# Patient Record
Sex: MALE | Race: WHITE | Employment: FULL TIME | URBAN - METROPOLITAN AREA
[De-identification: names, ages, dates, MRNs, and addresses within clinical notes are randomized per-mention and may not be internally consistent; named-entity substitution may affect disease eponyms.]

---

## 2017-03-07 ENCOUNTER — HOSPITAL ENCOUNTER (OUTPATIENT)
Dept: LAB | Age: 72
Discharge: HOME | End: 2017-03-07
Payer: MEDICARE

## 2017-03-07 PROCEDURE — 80061 LIPID PANEL: CPT

## 2017-03-07 PROCEDURE — 82043 UR ALBUMIN QUANTITATIVE: CPT

## 2017-03-07 PROCEDURE — 83036 HEMOGLOBIN GLYCOSYLATED A1C: CPT

## 2017-03-07 PROCEDURE — 36415 COLL VENOUS BLD VENIPUNCTURE: CPT

## 2017-03-07 PROCEDURE — 80053 COMPREHEN METABOLIC PANEL: CPT

## 2017-03-14 ENCOUNTER — OFFICE VISIT (OUTPATIENT)
Dept: ENDOCRINOLOGY | Age: 72
End: 2017-03-14

## 2017-03-14 VITALS
HEART RATE: 77 BPM | SYSTOLIC BLOOD PRESSURE: 113 MMHG | WEIGHT: 280 LBS | HEIGHT: 71 IN | BODY MASS INDEX: 39.2 KG/M2 | DIASTOLIC BLOOD PRESSURE: 49 MMHG | RESPIRATION RATE: 14 BRPM | TEMPERATURE: 97.5 F

## 2017-03-14 DIAGNOSIS — E11.65 UNCONTROLLED TYPE 2 DIABETES MELLITUS WITH HYPERGLYCEMIA, WITH LONG-TERM CURRENT USE OF INSULIN (HCC): Primary | ICD-10-CM

## 2017-03-14 DIAGNOSIS — I10 ESSENTIAL HYPERTENSION: ICD-10-CM

## 2017-03-14 DIAGNOSIS — Z79.4 UNCONTROLLED TYPE 2 DIABETES MELLITUS WITH HYPERGLYCEMIA, WITH LONG-TERM CURRENT USE OF INSULIN (HCC): Primary | ICD-10-CM

## 2017-03-14 DIAGNOSIS — E66.01 MORBID OBESITY DUE TO EXCESS CALORIES (HCC): ICD-10-CM

## 2017-03-14 DIAGNOSIS — E78.2 MIXED HYPERLIPIDEMIA: ICD-10-CM

## 2017-03-14 RX ORDER — CARVEDILOL 12.5 MG/1
25 TABLET ORAL 2 TIMES DAILY WITH MEALS
COMMUNITY
End: 2018-08-31 | Stop reason: ALTCHOICE

## 2017-03-14 NOTE — PROGRESS NOTES
HISTORY OF PRESENT ILLNESS   Bony Schroeder is a 67  y.o. male. HPI   Patient here for follow up of Type 2 diabetes mellitus after his last visit in Dec 2016     Lost 3  lbs   Forgot the meter   denies lack of  compliance with diet    Not able to take bydureon because of donut hole and he just got into catastrophic coverage     He has not started on humalog and he stayed on prandin  He appears not  depressed today    His cardiologist Dr. Niko Arnold increased coreg dose two weeks ago     Denies any low sugars   Down load showed better numbers mostly but few high sugars too             Prior DM history-   H/o DM 2 for 11 yrs. Discontinued victoza sec to diarrhoea and higher co-pay. Started back on Byetta   Current diabetic medications include Metformin, glipizide and Byetta. Current monitoring regimen: home blood tests - 2 times daily   Home blood sugar records: fasting range: 140-10mg postprandial range: 130-160 mg   Post prandial more than 300 mg   Current exercise: no regular exercise   Known diabetic complications: peripheral neuropathy and cardiovascular diseases/p CABG           Review of Systems   Constitutional: Negative. HENT: Negative. Eyes: Negative for pain and redness. Respiratory: Negative. Cardiovascular: Negative for chest pain, palpitations and leg swelling. Gastrointestinal: Negative. Negative for constipation. Genitourinary: Negative. Musculoskeletal: Negative for myalgias. Skin: Negative. Neurological: Negative. Endo/Heme/Allergies: Negative. Psychiatric/Behavioral: Negative for depression and memory loss. The patient does not have insomnia. Physical Exam   Constitutional: He is oriented to person, place, and time. He appears well-developed and well-nourished. HENT:   Head: Normocephalic. Eyes: Conjunctivae and extraocular motions are normal. Pupils are equal, round, and reactive to light. Neck: Normal range of motion. Neck supple. No JVD present.  No tracheal deviation present. No thyromegaly present. Cardiovascular: Normal rate, regular rhythm and normal heart sounds. Pulmonary/Chest: Effort normal and breath sounds normal.   Abdominal: Soft. Bowel sounds are normal.   Musculoskeletal: Normal range of motion. Lymphadenopathy:   He has no cervical adenopathy. Neurological: He is alert and oriented to person, place, and time. He has normal reflexes. Skin: Skin is warm. Psychiatric: He has a normal mood and affect. Diabetic feet exam june 2016    H/o partial or complete amputation of foot : no  H/o previous foot ulceration : no  H/o pre - ulcerative callus : no  H/o peripheral neuropathy and callus : yes  H/o poor circulation     BRIDGER    : no  Foot deformity : no        Lab Results   Component Value Date/Time    Hemoglobin A1c 8.3 03/07/2017 08:33 AM    Hemoglobin A1c 8.2 12/06/2016 02:25 PM    Hemoglobin A1c 8.8 09/06/2016 12:00 AM    Microalb/Creat ratio (ug/mg creat.) 6.9 03/07/2017 08:33 AM    LDL, calculated 80 03/07/2017 08:33 AM    Creatinine 1.00 03/07/2017 08:33 AM      Lab Results   Component Value Date/Time    Cholesterol, total 149 03/07/2017 08:33 AM    HDL Cholesterol 39 03/07/2017 08:33 AM    LDL, calculated 80 03/07/2017 08:33 AM    Triglyceride 150 03/07/2017 08:33 AM     Lab Results   Component Value Date/Time    AST (SGOT) 11 03/07/2017 08:33 AM    Alk. phosphatase 82 03/07/2017 08:33 AM     Lab Results   Component Value Date/Time    GFR est AA 86 03/07/2017 08:33 AM    GFR est non-AA 74 03/07/2017 08:33 AM    Creatinine 1.00 03/07/2017 08:33 AM    BUN 23 03/07/2017 08:33 AM    Sodium 134 03/07/2017 08:33 AM    Potassium 4.7 03/07/2017 08:33 AM    Chloride 95 03/07/2017 08:33 AM    CO2 23 03/07/2017 08:33 AM              ASSESSMENT and PLAN       1. Type 2 diabetes mellitus, uncontrolled.  A1C is  8.3   %    From  March 2016   Compared to  8.2 %     From  Dec 2016   comapred to 8.8 %    From sept 2016  compared to  8.1 %     From march 2015 compared to    8 %    From dec 2014  Compared to    7 %  From May 2014 compared to  7.4 % from feb 2014 compared to 7.3 % from nov 2013 compared to  6.8 % from June 2013 compared to  8 % march 2013 ; compared to  7.1 % July 2012 compared to 7 % compared to 6.9 % from oct 2011     Losing    glycemic control  , with dietary non-compliance      reviewed the log     The patient is told to continue on Glucophage  And  On and off on bydureon by cost and coverage   Could not tolerate victoza as he had GI distress     Encouraged him to start on meal time  insulin at least at  dinner   So, started him on 10 units with dinner   And no prandin   Please check 2 hours dinner     Recommending  IPRO         2. Hypertension. Blood pressure is well controlled on lisinopril, HCTZ and carvedilol. 3. Dyslipidemia. Zocor 40 mg is being continued. 4. PAD : normal BRIDGER     5. CAD/CABG : 2003-  afib- s/p cardioablation- on pradexa  And Tikosyn   F/u with dr. Arianna Felipe and Dr Jose Manuel Keenan     6  Hypogonadism - from june 2013 labs , hypergonadotropic, and fatigue is from low T     7. Obesity : Body mass index is 39.05 kg/(m^2).   TLC advised   bydureon optional by cost       > 50 % of time is spent on counseling

## 2017-03-14 NOTE — PATIENT INSTRUCTIONS
bydureon 2 mg once  A week     Check blood sugars immediately before each meal and at bedtime     NPH insulin 70 units at bed time ( less expensive  Go to   [employer] [facility] [place])    Take prandin  2 mg right before b-fast and lunch    Humulin regular   Insulin  10 units  with dinner ,  but you will not take prandin before that meal    Less than 70 mg NO INSULIN        Do not skip meals  Do not eat in between meals  Reduce carbs- pasta, rice, potatoes, bread   Do not drink juices or sodas  Do not eat peanut butter     Do not eat sugar free cookies and cakes   Do not eat grapes, ornages, pine apple and peaches , raisins

## 2017-03-14 NOTE — MR AVS SNAPSHOT
Visit Information Date & Time Provider Department Dept. Phone Encounter #  
 3/14/2017  9:45 AM Mandy Roberson MD [facility] [phone] [id number] Follow-up Instructions Return in about 2 months (around 5/14/2017). Upcoming Health Maintenance Date Due  
 FOOT EXAM Q1 1/14/1954 DTaP/Tdap/Td series (1 - Tdap) 1/14/1965 FOBT Q 1 YEAR AGE 50-75 1/14/1994 ZOSTER VACCINE AGE 60> 1/14/2004 GLAUCOMA SCREENING Q2Y 1/14/2009 Pneumococcal 65+ Low/Medium Risk (1 of 2 - PCV13) 1/14/2009 MEDICARE YEARLY EXAM 1/14/2009 EYE EXAM RETINAL OR DILATED Q1 6/30/2015 INFLUENZA AGE 9 TO ADULT 8/1/2016 HEMOGLOBIN A1C Q6M 9/7/2017 MICROALBUMIN Q1 3/7/2018 LIPID PANEL Q1 3/7/2018 Allergies as of 3/14/2017  Review Complete On: 3/14/2017 By: Mandy Roberson MD  
  
 Severity Noted Reaction Type Reactions Sulfa (Sulfonamide Antibiotics)  09/08/2010    Unable to Obtain Current Immunizations  Never Reviewed No immunizations on file. Not reviewed this visit You Were Diagnosed With   
  
 Codes Comments Uncontrolled type 2 diabetes mellitus with hyperglycemia, with long-term current use of insulin (HCC)    -  Primary ICD-10-CM: E11.65, Z79.4 ICD-9-CM: 250.02, V58.67 Morbid obesity due to excess calories (HCC)     ICD-10-CM: E66.01 
ICD-9-CM: 278.01 Mixed hyperlipidemia     ICD-10-CM: E78.2 ICD-9-CM: 272.2 Essential hypertension     ICD-10-CM: I10 
ICD-9-CM: 401.9 Vitals BP Pulse Temp Resp Height(growth percentile) Weight(growth percentile) 113/49 (BP 1 Location: Left arm, BP Patient Position: Sitting) 77 97.5 °F (36.4 °C) (Oral) 14 5' 11\" (1.803 m) 280 lb (127 kg) BMI Smoking Status 39.05 kg/m2 Never Smoker BMI and BSA Data Body Mass Index Body Surface Area 39.05 kg/m 2 2.52 m 2 Preferred Pharmacy Pharmacy Name Phone [address] [place], [place] - [address] [phone] Your Updated Medication List  
  
   
This list is accurate as of: 3/14/17 10:27 AM.  Always use your most recent med list.  
  
  
  
  
 AMITRIPTYLINE PO Take 25 mg by mouth nightly. COREG 12.5 mg tablet Generic drug:  carvedilol Take  by mouth two (2) times daily (with meals). exenatide microspheres 2 mg/0.65 mL Pnij Commonly known as:  BYDUREON  
2 mg by SubCUTAneous route every seven (7) days. glucose blood VI test strips strip Commonly known as:  TRUETEST TEST STRIPS Test 4 times daily. Dx code E11.65 Insulin Needles (Disposable) 32 gauge x 5/32\" Ndle Commonly known as:  Carolann Pen Needle Use once daily. Dx code E11.65  
  
 insulin  unit/mL injection Commonly known as:  Catherine Gains Inject 70 units at bedtime. insulin regular 100 unit/mL injection Commonly known as:  Brian Hong, HUMULIN R Inject 10 units before dinner  
  
 insulin syringe-needle U-100 1 mL 31 gauge x 15/64\" Syrg Commonly known as:  BD INSULIN SYRINGE ULTRA-FINE  
1 Syringe by SubCUTAneous route two (2) times a day. Dx code E11.65  
  
 lancets 28 gauge Misc Commonly known as:  Alice Km Test 4 times daily. Dx code E11.65  
  
 LASIX 80 mg tablet Generic drug:  furosemide Take  by mouth daily. lisinopril 20 mg tablet Commonly known as:  Remberto Shutters Take  by mouth daily. magnesium oxide 400 mg tablet Commonly known as:  MAG-OX Take 400 mg by mouth daily. metFORMIN 1,000 mg tablet Commonly known as:  GLUCOPHAGE  
TAKE 1 TABLET TWICE A DAYWITH MEALS  
  
 MOBIC PO Take 15 mg by mouth as needed. penicillin v potassium 250 mg tablet Commonly known as:  VEETID Take  by mouth two (2) times a day. PRADAXA 150 mg capsule Generic drug:  dabigatran etexilate Take  by mouth every twelve (12) hours. repaglinide 2 mg tablet Commonly known as:  Iraj Loll Take 1 tablet before breakfast, and 1 tablet before lunch  
  
 simvastatin 40 mg tablet Commonly known as:  ZOCOR Take 20 mg by mouth nightly. TIKOSYN 250 mcg capsule Generic drug:  dofetilide Take 250 mcg by mouth two (2) times a day. traMADol 50 mg tablet Commonly known as:  ULTRAM  
Take 50 mg by mouth daily as needed. Follow-up Instructions Return in about 2 months (around 5/14/2017). Patient Instructions   
 
bydureon 2 mg once  A week Check blood sugars immediately before each meal and at bedtime NPH insulin 70 units at bed time ( less expensive  Go to   [employer] [facility] [place]) Take prandin  2 mg right before b-fast and lunch Humulin regular   Insulin  10 units  with dinner ,  but you will not take prandin before that meal   
Less than 70 mg NO INSULIN 
 
 
 
Do not skip meals Do not eat in between meals Reduce carbs- pasta, rice, potatoes, bread Do not drink juices or sodas Do not eat peanut butter Do not eat sugar free cookies and cakes Do not eat grapes, ornages, pine apple and peaches , raisins Introducing [facility]! Kandice Duran introduces [employer] patient portal. Now you can access parts of your medical record, email your doctor's office, and request medication refills online. 1. In your internet browser, go to [email or web address] 2. Click on the First Time User? Click Here link in the Sign In box. You will see the New Member Sign Up page. 3. Enter your [employer] Access Code exactly as it appears below. You will not need to use this code after youve completed the sign-up process. If you do not sign up before the expiration date, you must request a new code. · [employer] Access Code: [email or web address] Expires: 6/12/2017 10:27 AM 
 
4.  Enter the last four digits of your Social Security Number (xxxx) and Date of Birth (mm/dd/yyyy) as indicated and click Submit. You will be taken to the next sign-up page. 5. Create a [employer] ID. This will be your [employer] login ID and cannot be changed, so think of one that is secure and easy to remember. 6. Create a [employer] password. You can change your password at any time. 7. Enter your Password Reset Question and Answer. This can be used at a later time if you forget your password. 8. Enter your e-mail address. You will receive e-mail notification when new information is available in [address] 9. Click Sign Up. You can now view and download portions of your medical record. 10. Click the Download Summary menu link to download a portable copy of your medical information. If you have questions, please visit the Frequently Asked Questions section of the [employer] website. Remember, [employer] is NOT to be used for urgent needs. For medical emergencies, dial 911. Now available from your iPhone and Android! Please provide this summary of care documentation to your next provider. Your primary care clinician is listed as Jackie Bradshaw. If you have any questions after today's visit, please call [phone].

## 2017-03-14 NOTE — PROGRESS NOTES
Concerns with blood pressure being a little low.  increased coreg 2 weeks ago  Wt Readings from Last 3 Encounters:   03/14/17 280 lb (127 kg)   12/13/16 283 lb (128.4 kg)   09/12/16 284 lb (128.8 kg)     Temp Readings from Last 3 Encounters:   03/14/17 97.5 °F (36.4 °C) (Oral)   12/13/16 98 °F (36.7 °C) (Oral)   09/12/16 97.4 °F (36.3 °C) (Oral)     BP Readings from Last 3 Encounters:   03/14/17 113/49   12/13/16 140/63   09/12/16 131/64     Pulse Readings from Last 3 Encounters:   03/14/17 77   12/13/16 80   09/12/16 80     Lab Results   Component Value Date/Time    Hemoglobin A1c 8.3 03/07/2017 08:33 AM    Hemoglobin A1c (POC) 6.9 09/24/2015 02:13 PM

## 2017-04-28 RX ORDER — REPAGLINIDE 2 MG/1
TABLET ORAL
Qty: 90 TAB | Refills: 6 | Status: SHIPPED | OUTPATIENT
Start: 2017-04-28 | End: 2017-05-22 | Stop reason: SDUPTHER

## 2017-05-22 RX ORDER — REPAGLINIDE 2 MG/1
TABLET ORAL
Qty: 270 TAB | Refills: 4 | Status: SHIPPED | OUTPATIENT
Start: 2017-05-22 | End: 2018-05-09 | Stop reason: SDUPTHER

## 2017-07-01 ENCOUNTER — OFFICE VISIT (OUTPATIENT)
Dept: ENDOCRINOLOGY | Age: 72
End: 2017-07-01

## 2017-07-01 VITALS
HEART RATE: 74 BPM | WEIGHT: 278.8 LBS | OXYGEN SATURATION: 97 % | DIASTOLIC BLOOD PRESSURE: 54 MMHG | BODY MASS INDEX: 39.03 KG/M2 | RESPIRATION RATE: 18 BRPM | HEIGHT: 71 IN | SYSTOLIC BLOOD PRESSURE: 116 MMHG | TEMPERATURE: 98.4 F

## 2017-07-01 DIAGNOSIS — I10 ESSENTIAL HYPERTENSION: ICD-10-CM

## 2017-07-01 DIAGNOSIS — E66.01 MORBID OBESITY DUE TO EXCESS CALORIES (HCC): ICD-10-CM

## 2017-07-01 DIAGNOSIS — E11.65 UNCONTROLLED TYPE 2 DIABETES MELLITUS WITH HYPERGLYCEMIA, WITH LONG-TERM CURRENT USE OF INSULIN (HCC): Primary | ICD-10-CM

## 2017-07-01 DIAGNOSIS — E78.2 MIXED HYPERLIPIDEMIA: ICD-10-CM

## 2017-07-01 DIAGNOSIS — Z79.4 UNCONTROLLED TYPE 2 DIABETES MELLITUS WITH HYPERGLYCEMIA, WITH LONG-TERM CURRENT USE OF INSULIN (HCC): Primary | ICD-10-CM

## 2017-07-01 LAB
GLUCOSE POC: 196 MG/DL
HBA1C MFR BLD HPLC: 7.9 %

## 2017-07-01 NOTE — PROGRESS NOTES
Wt Readings from Last 3 Encounters:   07/01/17 278 lb 12.8 oz (126.5 kg)   03/14/17 280 lb (127 kg)   12/13/16 283 lb (128.4 kg)     Temp Readings from Last 3 Encounters:   07/01/17 98.4 °F (36.9 °C) (Oral)   03/14/17 97.5 °F (36.4 °C) (Oral)   12/13/16 98 °F (36.7 °C) (Oral)     BP Readings from Last 3 Encounters:   07/01/17 116/54   03/14/17 113/49   12/13/16 140/63     Pulse Readings from Last 3 Encounters:   07/01/17 74   03/14/17 77   12/13/16 80     Lab Results   Component Value Date/Time    Hemoglobin A1c 8.3 03/07/2017 08:33 AM    Hemoglobin A1c (POC) 6.9 09/24/2015 02:13 PM   Foot exam: No  Eye exam: no    1. Have you been to the ER, urgent care clinic since your last visit? Hospitalized since your last visit? No    2. Have you seen or consulted any other health care providers outside of the [address] [place] [address] since your last visit? Include any pap smears or colon screening. Patient reports seeing Anne Marie Amezquita for heart related issues.

## 2017-07-01 NOTE — PATIENT INSTRUCTIONS
bydureon 2 mg once  A week ( optional)      Start on Jardiance  10 mg a day     Check blood sugars immediately before each meal and at bedtime     NPH insulin 70 units at bed time ( less expensive  Go to   [employer] [facility])    Take prandin  2 mg right before b-fast and lunch    Humulin regular   Insulin  10 units  with dinner ,  but you will not take prandin before that meal    Less than 70 mg NO INSULIN        Do not skip meals  Do not eat in between meals  Reduce carbs- pasta, rice, potatoes, bread   Do not drink juices or sodas  Do not eat peanut butter     Do not eat sugar free cookies and cakes   Do not eat grapes, ornages, pine apple and peaches , raisins

## 2017-07-01 NOTE — PROGRESS NOTES
HISTORY OF PRESENT ILLNESS   Edy Sepulveda is a 67  y.o. male. HPI   Patient here for follow up of Type 2 diabetes mellitus after his last visit in MArch 2017     Lost 2  lbs   Forgot the meter AGAIN second time   denies lack of  compliance with diet    Not able to take bydureon because of donut hole and he just got into catastrophic coverage     He has not started on humalog and he stayed on prandin  He appears not  depressed today    Denies any low sugars   Down load showed better numbers mostly but few high sugars too             Prior DM history-   H/o DM 2 for 11 yrs. Discontinued victoza sec to diarrhoea and higher co-pay. Started back on Byetta   Current diabetic medications include Metformin, glipizide and Byetta. Current monitoring regimen: home blood tests - 2 times daily   Home blood sugar records: fasting range: 140-10mg postprandial range: 130-160 mg   Post prandial more than 300 mg   Current exercise: no regular exercise   Known diabetic complications: peripheral neuropathy and cardiovascular diseases/p CABG           Review of Systems   Constitutional: Negative. HENT: Negative. Eyes: Negative for pain and redness. Respiratory: Negative. Cardiovascular: Negative for chest pain, palpitations and leg swelling. Gastrointestinal: Negative. Negative for constipation. Genitourinary: Negative. Musculoskeletal: Negative for myalgias. Skin: Negative. Neurological: Negative. Endo/Heme/Allergies: Negative. Psychiatric/Behavioral: Negative for depression and memory loss. The patient does not have insomnia. Physical Exam   Constitutional: He is oriented to person, place, and time. He appears well-developed and well-nourished. HENT:   Head: Normocephalic. Eyes: Conjunctivae and extraocular motions are normal. Pupils are equal, round, and reactive to light. Neck: Normal range of motion. Neck supple. No JVD present. No tracheal deviation present. No thyromegaly present. Cardiovascular: Normal rate, regular rhythm and normal heart sounds. Pulmonary/Chest: Effort normal and breath sounds normal.   Abdominal: Soft. Bowel sounds are normal.   Musculoskeletal: Normal range of motion. Lymphadenopathy:   He has no cervical adenopathy. Neurological: He is alert and oriented to person, place, and time. He has normal reflexes. Skin: Skin is warm. Psychiatric: He has a normal mood and affect. Diabetic feet exam July 2017    H/o partial or complete amputation of foot : no  H/o previous foot ulceration : no  H/o pre - ulcerative callus : no  H/o peripheral neuropathy and callus : yes  H/o poor circulation     BRIDGER    : no  Foot deformity : no        Lab Results   Component Value Date/Time    Hemoglobin A1c 8.3 03/07/2017 08:33 AM    Hemoglobin A1c 8.2 12/06/2016 02:25 PM    Hemoglobin A1c 8.8 09/06/2016 12:00 AM    Microalb/Creat ratio (ug/mg creat.) 6.9 03/07/2017 08:33 AM    LDL, calculated 80 03/07/2017 08:33 AM    Creatinine 1.00 03/07/2017 08:33 AM      Lab Results   Component Value Date/Time    Cholesterol, total 149 03/07/2017 08:33 AM    HDL Cholesterol 39 03/07/2017 08:33 AM    LDL, calculated 80 03/07/2017 08:33 AM    Triglyceride 150 03/07/2017 08:33 AM     Lab Results   Component Value Date/Time    AST (SGOT) 11 03/07/2017 08:33 AM    Alk. phosphatase 82 03/07/2017 08:33 AM     Lab Results   Component Value Date/Time    GFR est AA 86 03/07/2017 08:33 AM    GFR est non-AA 74 03/07/2017 08:33 AM    Creatinine 1.00 03/07/2017 08:33 AM    BUN 23 03/07/2017 08:33 AM    Sodium 134 03/07/2017 08:33 AM    Potassium 4.7 03/07/2017 08:33 AM    Chloride 95 03/07/2017 08:33 AM    CO2 23 03/07/2017 08:33 AM              ASSESSMENT and PLAN       1. Type 2 diabetes mellitus, uncontrolled.  A1C is 7.9 %     From     July 1 2017   8.3   %    From  March 2016   Compared to  8.2 %     From  Dec 2016   comapred to 8.8 %    From sept 2016  compared to  8.1 %     From march 2015 compared to    8 %    From dec 2014  Compared to    7 %  From May 2014 compared to  7.4 % from feb 2014 compared to 7.3 % from nov 2013 compared to  6.8 % from June 2013 compared to  8 % march 2013 ; compared to  7.1 % July 2012 compared to 7 % compared to 6.9 % from oct 2011     Some improved  glycemic control  , with dietary non-compliance      reviewed the log     He went into Donut hole early May     The log is showing higher fasting sugars whenever he missed the lantus     The patient is told to continue on Glucophage  And  On and off on bydureon by cost and coverage   Could not tolerate victoza as he had GI distress     Encouraged him to start on meal time  insulin at least at  dinner   So, started him on 10 units with dinner   And no prandin   Please check 2 hours dinner           2. Hypertension. Blood pressure is well controlled on lisinopril, HCTZ and carvedilol. 3. Dyslipidemia. Zocor 40 mg is being continued. 4. PAD : normal BRIDGER     5. CAD/CABG : 2003-  afib- s/p cardioablation- on pradexa  And Tikosyn   F/u with dr. Dustin Greenberg and Dr Jose Manuel Mccullough     6  Hypogonadism - from june 2013 labs , hypergonadotropic, and fatigue is from low T     7. Obesity : Body mass index is 38.88 kg/(m^2).   TLC advised   bydureon optional by cost       > 50 % of time is spent on counseling     Addendum     IPRO was never finished because of technical  Issues  His plan covers only invokana and farxiga , not jardiance   reviewed his food log with him  Reviewed his meter

## 2017-07-01 NOTE — MR AVS SNAPSHOT
Visit Information Date & Time Provider Department Dept. Phone Encounter #  
 7/1/2017  9:00 AM Fernandez Buchanan MD [facility] [phone] [id number] Follow-up Instructions Return in about 4 months (around 11/1/2017). Upcoming Health Maintenance Date Due  
 FOOT EXAM Q1 1/14/1954 DTaP/Tdap/Td series (1 - Tdap) 1/14/1965 FOBT Q 1 YEAR AGE 50-75 1/14/1994 ZOSTER VACCINE AGE 60> 1/14/2004 GLAUCOMA SCREENING Q2Y 1/14/2009 Pneumococcal 65+ Low/Medium Risk (1 of 2 - PCV13) 1/14/2009 MEDICARE YEARLY EXAM 1/14/2009 EYE EXAM RETINAL OR DILATED Q1 6/30/2015 INFLUENZA AGE 9 TO ADULT 8/1/2017 HEMOGLOBIN A1C Q6M 9/7/2017 MICROALBUMIN Q1 3/7/2018 LIPID PANEL Q1 3/7/2018 Allergies as of 7/1/2017  Review Complete On: 7/1/2017 By: Fernandez Buchanan MD  
  
 Severity Noted Reaction Type Reactions Sulfa (Sulfonamide Antibiotics)  09/08/2010    Unable to Obtain Current Immunizations  Never Reviewed No immunizations on file. Not reviewed this visit You Were Diagnosed With   
  
 Codes Comments Uncontrolled type 2 diabetes mellitus with hyperglycemia, with long-term current use of insulin (HCC)    -  Primary ICD-10-CM: E11.65, Z79.4 ICD-9-CM: 250.02, V58.67 Essential hypertension     ICD-10-CM: I10 
ICD-9-CM: 401.9 Mixed hyperlipidemia     ICD-10-CM: E78.2 ICD-9-CM: 272.2 Morbid obesity due to excess calories (HCC)     ICD-10-CM: E66.01 
ICD-9-CM: 278.01   
 BMI 38.0-38.9,adult     ICD-10-CM: G27.16 
ICD-9-CM: V85.38 Vitals BP Pulse Temp Resp Height(growth percentile) Weight(growth percentile) 116/54 (BP 1 Location: Right arm, BP Patient Position: Sitting) 74 98.4 °F (36.9 °C) (Oral) 18 5' 11\" (1.803 m) 278 lb 12.8 oz (126.5 kg) SpO2 BMI Smoking Status 97% 38.88 kg/m2 Never Smoker BMI and BSA Data  Body Mass Index Body Surface Area  
 38.88 kg/m 2 2.52 m 2  
  
  
 Preferred Pharmacy Pharmacy Name Phone [phone] [place], [place] - [address] [phone] Your Updated Medication List  
  
   
This list is accurate as of: 7/1/17  9:28 AM.  Always use your most recent med list.  
  
  
  
  
 AMITRIPTYLINE PO Take 25 mg by mouth nightly. COREG 12.5 mg tablet Generic drug:  carvedilol Take 25 mg by mouth two (2) times daily (with meals). exenatide microspheres 2 mg/0.65 mL Pnij Commonly known as:  BYDUREON  
2 mg by SubCUTAneous route every seven (7) days. glucose blood VI test strips strip Commonly known as:  TRUETEST TEST STRIPS Test 4 times daily. Dx code E11.65 Insulin Needles (Disposable) 32 gauge x 5/32\" Ndle Commonly known as:  Carolann Pen Needle Use once daily. Dx code E11.65  
  
 insulin  unit/mL injection Commonly known as:  Tierra Ivory Inject 70 units at bedtime. insulin regular 100 unit/mL injection Commonly known as:  Meghan Mendoza, HUMULIN R Inject 10 units before dinner  
  
 insulin syringe-needle U-100 1 mL 31 gauge x 15/64\" Syrg Commonly known as:  BD INSULIN SYRINGE ULTRA-FINE  
1 Syringe by SubCUTAneous route two (2) times a day. Dx code E11.65  
  
 lancets 28 gauge Misc Commonly known as:  Ashley Shahid Test 4 times daily. Dx code E11.65  
  
 LASIX 80 mg tablet Generic drug:  furosemide Take  by mouth daily. lisinopril 20 mg tablet Commonly known as:  Getachew Annemarie Take  by mouth daily. magnesium oxide 400 mg tablet Commonly known as:  MAG-OX Take 400 mg by mouth daily. metFORMIN 1,000 mg tablet Commonly known as:  GLUCOPHAGE  
TAKE 1 TABLET TWICE A DAYWITH MEALS  
  
 MOBIC PO Take 15 mg by mouth as needed. penicillin v potassium 250 mg tablet Commonly known as:  VEETID Take  by mouth two (2) times a day. PRADAXA 150 mg capsule Generic drug:  dabigatran etexilate Take  by mouth every twelve (12) hours. repaglinide 2 mg tablet Commonly known as:  PRANDIN  
TAKE 1 TABLET BEFORE BREAKFAST, LUNCH AN DINNER  
  
 simvastatin 40 mg tablet Commonly known as:  ZOCOR Take 20 mg by mouth nightly. TIKOSYN 250 mcg capsule Generic drug:  dofetilide Take 250 mcg by mouth two (2) times a day. traMADol 50 mg tablet Commonly known as:  ULTRAM  
Take 50 mg by mouth daily as needed. We Performed the Following AMB POC GLUCOSE, QUANTITATIVE, BLOOD [29989 CPT(R)] AMB POC HEMOGLOBIN A1C [29749 CPT(R)] Follow-up Instructions Return in about 4 months (around 11/1/2017). Patient Instructions   
 
bydureon 2 mg once  A week ( optional) Start on Jardiance  10 mg a day Check blood sugars immediately before each meal and at bedtime NPH insulin 70 units at bed time ( less expensive  Go to   [facility]) Take prandin  2 mg right before b-fast and lunch Humulin regular   Insulin  10 units  with dinner ,  but you will not take prandin before that meal   
Less than 70 mg NO INSULIN 
 
 
 
Do not skip meals Do not eat in between meals Reduce carbs- pasta, rice, potatoes, bread Do not drink juices or sodas Do not eat peanut butter Do not eat sugar free cookies and cakes Do not eat grapes, ornages, pine apple and peaches , raisins Introducing [facility] & HEALTH SERVICES! Denilson Artis introduces [employer] patient portal. Now you can access parts of your medical record, email your doctor's office, and request medication refills online. 1. In your internet browser, go to [email or web address] 2. Click on the First Time User? Click Here link in the Sign In box. You will see the New Member Sign Up page. 3. Enter your [employer] Access Code exactly as it appears below. You will not need to use this code after youve completed the sign-up process.  If you do not sign up before the expiration date, you must request a new code. · [employer] Access Code: [email or web address] Expires: 9/29/2017  9:28 AM 
 
4. Enter the last four digits of your Social Security Number (xxxx) and Date of Birth (mm/dd/yyyy) as indicated and click Submit. You will be taken to the next sign-up page. 5. Create a [employer] ID. This will be your [employer] login ID and cannot be changed, so think of one that is secure and easy to remember. 6. Create a [employer] password. You can change your password at any time. 7. Enter your Password Reset Question and Answer. This can be used at a later time if you forget your password. 8. Enter your e-mail address. You will receive e-mail notification when new information is available in [address] 9. Click Sign Up. You can now view and download portions of your medical record. 10. Click the Download Summary menu link to download a portable copy of your medical information. If you have questions, please visit the Frequently Asked Questions section of the [employer] website. Remember, [employer] is NOT to be used for urgent needs. For medical emergencies, dial 911. Now available from your iPhone and Android! Please provide this summary of care documentation to your next provider. Your primary care clinician is listed as Verena Rondon. If you have any questions after today's visit, please call [phone].

## 2017-10-26 ENCOUNTER — HOSPITAL ENCOUNTER (OUTPATIENT)
Dept: LAB | Age: 72
Discharge: HOME | End: 2017-10-26
Payer: MEDICARE

## 2017-10-26 PROCEDURE — 83036 HEMOGLOBIN GLYCOSYLATED A1C: CPT

## 2017-10-26 PROCEDURE — 82043 UR ALBUMIN QUANTITATIVE: CPT

## 2017-10-26 PROCEDURE — 80061 LIPID PANEL: CPT

## 2017-10-26 PROCEDURE — 80053 COMPREHEN METABOLIC PANEL: CPT

## 2017-10-26 PROCEDURE — 36415 COLL VENOUS BLD VENIPUNCTURE: CPT

## 2017-11-02 ENCOUNTER — OFFICE VISIT (OUTPATIENT)
Dept: ENDOCRINOLOGY | Age: 72
End: 2017-11-02

## 2017-11-02 VITALS
DIASTOLIC BLOOD PRESSURE: 63 MMHG | WEIGHT: 277 LBS | RESPIRATION RATE: 18 BRPM | TEMPERATURE: 98.2 F | SYSTOLIC BLOOD PRESSURE: 132 MMHG | BODY MASS INDEX: 38.78 KG/M2 | HEIGHT: 71 IN | OXYGEN SATURATION: 96 % | HEART RATE: 65 BPM

## 2017-11-02 DIAGNOSIS — E11.65 UNCONTROLLED TYPE 2 DIABETES MELLITUS WITH HYPERGLYCEMIA, WITH LONG-TERM CURRENT USE OF INSULIN (HCC): Primary | ICD-10-CM

## 2017-11-02 DIAGNOSIS — I10 ESSENTIAL HYPERTENSION: ICD-10-CM

## 2017-11-02 DIAGNOSIS — E78.2 MIXED HYPERLIPIDEMIA: ICD-10-CM

## 2017-11-02 DIAGNOSIS — Z79.4 UNCONTROLLED TYPE 2 DIABETES MELLITUS WITH HYPERGLYCEMIA, WITH LONG-TERM CURRENT USE OF INSULIN (HCC): Primary | ICD-10-CM

## 2017-11-02 RX ORDER — METFORMIN HYDROCHLORIDE 1000 MG/1
TABLET ORAL
Qty: 60 TAB | Refills: 6 | Status: SHIPPED | OUTPATIENT
Start: 2017-11-02 | End: 2018-06-18 | Stop reason: SDUPTHER

## 2017-11-02 RX ORDER — CARVEDILOL 25 MG/1
TABLET ORAL
COMMUNITY
Start: 2017-10-17

## 2017-11-02 NOTE — PROGRESS NOTES
HISTORY OF PRESENT ILLNESS   Dontae Bundy is a 67  y.o. male. HPI   Patient here for follow up of Type 2 diabetes mellitus after his last visit in July 12017     Lost 1  lbs   got the meter   denies lack of  compliance with diet    Not able to take bydureon because of donut hole and he just got into catastrophic coverage     He has not started on humalog and he stayed on prandin  He appears not  depressed today    Denies any low sugars   Down load showed better numbers mostly but few high sugars too             Prior DM history-   H/o DM 2 for 11 yrs. Discontinued victoza sec to diarrhoea and higher co-pay. Started back on Byetta   Current diabetic medications include Metformin, glipizide and Byetta. Current monitoring regimen: home blood tests - 2 times daily   Home blood sugar records: fasting range: 140-10mg postprandial range: 130-160 mg   Post prandial more than 300 mg   Current exercise: no regular exercise   Known diabetic complications: peripheral neuropathy and cardiovascular diseases/p CABG           Review of Systems   Constitutional: Negative. HENT: Negative. Eyes: Negative for pain and redness. Respiratory: Negative. Cardiovascular: Negative for chest pain, palpitations and leg swelling. Gastrointestinal: Negative. Negative for constipation. Genitourinary: Negative. Musculoskeletal: Negative for myalgias. Skin: Negative. Neurological: Negative. Endo/Heme/Allergies: Negative. Psychiatric/Behavioral: Negative for depression and memory loss. The patient does not have insomnia. Physical Exam   Constitutional: He is oriented to person, place, and time. He appears well-developed and well-nourished. HENT:   Head: Normocephalic. Eyes: Conjunctivae and extraocular motions are normal. Pupils are equal, round, and reactive to light. Neck: Normal range of motion. Neck supple. No JVD present. No tracheal deviation present. No thyromegaly present.    Cardiovascular: Normal rate, regular rhythm and normal heart sounds. Pulmonary/Chest: Effort normal and breath sounds normal.   Abdominal: Soft. Bowel sounds are normal.   Musculoskeletal: Normal range of motion. Lymphadenopathy:   He has no cervical adenopathy. Neurological: He is alert and oriented to person, place, and time. He has normal reflexes. Skin: Skin is warm. Psychiatric: He has a normal mood and affect. Diabetic feet exam July 2017    H/o partial or complete amputation of foot : no  H/o previous foot ulceration : no  H/o pre - ulcerative callus : no  H/o peripheral neuropathy and callus : yes  H/o poor circulation     BRIDGER    : no  Foot deformity : no        Lab Results   Component Value Date/Time    Hemoglobin A1c 8.0 10/26/2017 09:30 AM    Hemoglobin A1c 8.3 03/07/2017 08:33 AM    Hemoglobin A1c 8.2 12/06/2016 02:25 PM    Microalb/Creat ratio (ug/mg creat.) 3.4 10/26/2017 09:30 AM    LDL, calculated 76 10/26/2017 09:30 AM    Creatinine 1.00 10/26/2017 09:30 AM      Lab Results   Component Value Date/Time    Cholesterol, total 139 10/26/2017 09:30 AM    HDL Cholesterol 36 10/26/2017 09:30 AM    LDL, calculated 76 10/26/2017 09:30 AM    Triglyceride 137 10/26/2017 09:30 AM     Lab Results   Component Value Date/Time    AST (SGOT) 13 10/26/2017 09:30 AM    Alk. phosphatase 75 10/26/2017 09:30 AM     Lab Results   Component Value Date/Time    GFR est AA 86 10/26/2017 09:30 AM    GFR est non-AA 74 10/26/2017 09:30 AM    Creatinine 1.00 10/26/2017 09:30 AM    BUN 18 10/26/2017 09:30 AM    Sodium 136 10/26/2017 09:30 AM    Potassium 4.7 10/26/2017 09:30 AM    Chloride 96 10/26/2017 09:30 AM    CO2 24 10/26/2017 09:30 AM              ASSESSMENT and PLAN       1. Type 2 diabetes mellitus, uncontrolled.  A1C is    8 %     From oct 29 2017    compared to  7.9 %     From     July 1 2017   8.3   %    From  March 2016   Compared to  8.2 %     From  Dec 2016   comapred to 8.8 %    From sept 2016  compared to  8.1 %     From march 2015 compared to    8 %    From dec 2014  Compared to    7 %  From May 2014 compared to  7.4 % from feb 2014 compared to 7.3 % from nov 2013 compared to  6.8 % from June 2013 compared to  8 % march 2013 ; compared to  7.1 % July 2012 compared to 7 % compared to 6.9 % from oct 2011     plateaued  glycemic control  , with dietary non-compliance     reviewed the log     He went into Donut hole early May   The log is showing higher fasting sugars whenever he missed the lantus     The patient is told to continue on Glucophage  And  On and off on bydureon by cost and coverage   Could not tolerate victoza as he had GI distress     On humalog  10 units with dinner     But encouraging to take humalog with b-fast and lunch And no prandin   Please check 2 hours dinner           2. Hypertension. Blood pressure is well controlled on lisinopril, HCTZ and carvedilol. 3. Dyslipidemia. Zocor 40 mg is being continued. 4. PAD : normal BRIDGER     5. CAD/CABG : 2003-  afib- s/p cardioablation- on pradexa  And Tikosyn   F/u with dr. Vamsi Reno and Dr Jose Manuel James     6  Hypogonadism - from june 2013 labs , hypergonadotropic, and fatigue is from low T     7. Obesity : Body mass index is 38.63 kg/(m^2).   TLC advised   bydureon optional by cost       > 50 % of time is spent on counseling

## 2017-11-02 NOTE — PROGRESS NOTES
Chief Complaint   Patient presents with    Diabetes     1. Have you been to the ER, urgent care clinic since your last visit? Hospitalized since your last visit? No    2. Have you seen or consulted any other health care providers outside of the [address] [place] [address] since your last visit? Include any pap smears or colon screening.  No     Wt Readings from Last 3 Encounters:   11/02/17 277 lb (125.6 kg)   07/01/17 278 lb 12.8 oz (126.5 kg)   03/14/17 280 lb (127 kg)     Temp Readings from Last 3 Encounters:   11/02/17 98.2 °F (36.8 °C) (Oral)   07/01/17 98.4 °F (36.9 °C) (Oral)   03/14/17 97.5 °F (36.4 °C) (Oral)     BP Readings from Last 3 Encounters:   11/02/17 132/63   07/01/17 116/54   03/14/17 113/49     Pulse Readings from Last 3 Encounters:   11/02/17 65   07/01/17 74   03/14/17 77     Pt has meter  No foot exam or eye exam

## 2017-11-02 NOTE — MR AVS SNAPSHOT
Visit Information Date & Time Provider Department Dept. Phone Encounter #  
 11/2/2017  9:30 AM Chris Fonseca MD [facility] [phone] [id number] Follow-up Instructions Return in about 6 months (around 5/2/2018). Upcoming Health Maintenance Date Due  
 FOOT EXAM Q1 1/14/1954 DTaP/Tdap/Td series (1 - Tdap) 1/14/1965 FOBT Q 1 YEAR AGE 50-75 1/14/1994 ZOSTER VACCINE AGE 60> 11/14/2003 GLAUCOMA SCREENING Q2Y 1/14/2009 Pneumococcal 65+ Low/Medium Risk (1 of 2 - PCV13) 1/14/2009 MEDICARE YEARLY EXAM 1/14/2009 EYE EXAM RETINAL OR DILATED Q1 6/30/2015 INFLUENZA AGE 9 TO ADULT 8/1/2017 HEMOGLOBIN A1C Q6M 4/26/2018 MICROALBUMIN Q1 10/26/2018 LIPID PANEL Q1 10/26/2018 Allergies as of 11/2/2017  Review Complete On: 11/2/2017 By: Chris Fonseca MD  
  
 Severity Noted Reaction Type Reactions Sulfa (Sulfonamide Antibiotics)  09/08/2010    Unable to Obtain Current Immunizations  Never Reviewed No immunizations on file. Not reviewed this visit Vitals BP Pulse Temp Resp Height(growth percentile) Weight(growth percentile) 132/63 (BP 1 Location: Right arm, BP Patient Position: Sitting) 65 98.2 °F (36.8 °C) (Oral) 18 5' 11\" (1.803 m) 277 lb (125.6 kg) SpO2 BMI Smoking Status 96% 38.63 kg/m2 Never Smoker BMI and BSA Data Body Mass Index Body Surface Area  
 38.63 kg/m 2 2.51 m 2 Preferred Pharmacy Pharmacy Name Phone [phone] [facility] [address] [facility] [phone] Your Updated Medication List  
  
   
This list is accurate as of: 11/2/17 10:09 AM.  Always use your most recent med list.  
  
  
  
  
 AMITRIPTYLINE PO Take 25 mg by mouth nightly. * COREG 12.5 mg tablet Generic drug:  carvedilol Take 25 mg by mouth two (2) times daily (with meals). * carvedilol 25 mg tablet Commonly known as:  Edgar Mclaughlin  
  
 exenatide microspheres 2 mg/0.65 mL Pnij Commonly known as:  BYDUREON  
2 mg by SubCUTAneous route every seven (7) days. glucose blood VI test strips strip Commonly known as:  TRUETEST TEST STRIPS Test 4 times daily. Dx code E11.65 Insulin Needles (Disposable) 32 gauge x 5/32\" Ndle Commonly known as:  Carolann Pen Needle Use once daily. Dx code E11.65  
  
 insulin  unit/mL injection Commonly known as:  Fayrene Low Inject 70 units at bedtime. insulin regular 100 unit/mL injection Commonly known as:  Chance Chow, HUMULIN R Inject 10 units before dinner  
  
 insulin syringe-needle U-100 1 mL 31 gauge x 15/64\" Syrg Commonly known as:  BD INSULIN SYRINGE ULTRA-FINE  
1 Syringe by SubCUTAneous route two (2) times a day. Dx code E11.65  
  
 lancets 28 gauge Misc Commonly known as:  Aung Chambers Test 4 times daily. Dx code E11.65  
  
 LASIX 80 mg tablet Generic drug:  furosemide Take  by mouth daily. lisinopril 20 mg tablet Commonly known as:  Marge Conchita Take  by mouth daily. magnesium oxide 400 mg tablet Commonly known as:  MAG-OX Take 400 mg by mouth daily. metFORMIN 1,000 mg tablet Commonly known as:  GLUCOPHAGE  
TAKE 1 TABLET TWICE A DAYWITH MEALS  
  
 MOBIC PO Take 15 mg by mouth as needed. penicillin v potassium 250 mg tablet Commonly known as:  VEETID Take  by mouth two (2) times a day. PRADAXA 150 mg capsule Generic drug:  dabigatran etexilate Take  by mouth every twelve (12) hours. repaglinide 2 mg tablet Commonly known as:  PRANDIN  
TAKE 1 TABLET BEFORE BREAKFAST, LUNCH AN DINNER  
  
 simvastatin 40 mg tablet Commonly known as:  ZOCOR Take 20 mg by mouth nightly. TIKOSYN 250 mcg capsule Generic drug:  dofetilide Take 250 mcg by mouth two (2) times a day. traMADol 50 mg tablet Commonly known as:  ULTRAM  
Take 50 mg by mouth daily as needed. * Notice: This list has 2 medication(s) that are the same as other medications prescribed for you. Read the directions carefully, and ask your doctor or other care provider to review them with you. Follow-up Instructions Return in about 6 months (around 5/2/2018). Patient Instructions   
 
bydureon 2 mg once  A week ( optional) Start on Jardiance  10 mg a day Check blood sugars immediately before each meal and at bedtime NPH insulin 70 units at bed time ( less expensive  Go to   [facility]) Take prandin  2 mg right before b-fast and lunch Humulin regular   Insulin  10 units  with dinner ,  but you will not take prandin before that meal   
Less than 70 mg NO INSULIN 
 
 
 
Do not skip meals Do not eat in between meals Reduce carbs- pasta, rice, potatoes, bread Do not drink juices or sodas Do not eat peanut butter Do not eat sugar free cookies and cakes Do not eat grapes, ornages, pine apple and peaches , raisins Introducing [facility] & HE[facility] SERVICES! Nivia Saunders introduces [employer] patient portal. Now you can access parts of your medical record, email your doctor's office, and request medication refills online. 1. In your internet browser, go to [email or web address] 2. Click on the First Time User? Click Here link in the Sign In box. You will see the New Member Sign Up page. 3. Enter your [employer] Access Code exactly as it appears below. You will not need to use this code after youve completed the sign-up process. If you do not sign up before the expiration date, you must request a new code. · [employer] Access Code: [email or web address] Expires: 1/24/2018  9:26 AM 
 
4. Enter the last four digits of your Social Security Number (xxxx) and Date of Birth (mm/dd/yyyy) as indicated and click Submit. You will be taken to the next sign-up page. 5. Create a [employer] ID.  This will be your [employer] login ID and cannot be changed, so think of one that is secure and easy to remember. 6. Create a [employer] password. You can change your password at any time. 7. Enter your Password Reset Question and Answer. This can be used at a later time if you forget your password. 8. Enter your e-mail address. You will receive e-mail notification when new information is available in [address] 9. Click Sign Up. You can now view and download portions of your medical record. 10. Click the Download Summary menu link to download a portable copy of your medical information. If you have questions, please visit the Frequently Asked Questions section of the [employer] website. Remember, [employer] is NOT to be used for urgent needs. For medical emergencies, dial 911. Now available from your iPhone and Android! Please provide this summary of care documentation to your next provider. Your primary care clinician is listed as [facility]. If you have any questions after today's visit, please call [phone].

## 2017-11-02 NOTE — PATIENT INSTRUCTIONS
bydureon 2 mg once  A week ( optional)      Check blood sugars immediately before each meal and at bedtime     NPH insulin 70 units at bed time ( less expensive  Go to   [facility])    Take prandin  2 mg right before b-fast and lunch    Humulin regular   Insulin  10 units  with dinner ,  but you will not take prandin before that meal    Less than 70 mg NO INSULIN        Do not skip meals  Do not eat in between meals  Reduce carbs- pasta, rice, potatoes, bread   Do not drink juices or sodas  Do not eat peanut butter     Do not eat sugar free cookies and cakes   Do not eat grapes, ornages, pine apple and peaches , raisins

## 2017-11-08 ENCOUNTER — TELEPHONE (OUTPATIENT)
Dept: ENDOCRINOLOGY | Age: 72
End: 2017-11-08

## 2018-05-02 ENCOUNTER — HOSPITAL ENCOUNTER (OUTPATIENT)
Dept: LAB | Age: 73
Discharge: HOME | End: 2018-05-02
Payer: MEDICARE

## 2018-05-02 PROCEDURE — 80053 COMPREHEN METABOLIC PANEL: CPT

## 2018-05-02 PROCEDURE — 80061 LIPID PANEL: CPT

## 2018-05-02 PROCEDURE — 82043 UR ALBUMIN QUANTITATIVE: CPT

## 2018-05-02 PROCEDURE — 36415 COLL VENOUS BLD VENIPUNCTURE: CPT

## 2018-05-02 PROCEDURE — 83036 HEMOGLOBIN GLYCOSYLATED A1C: CPT

## 2018-05-09 ENCOUNTER — OFFICE VISIT (OUTPATIENT)
Dept: ENDOCRINOLOGY | Age: 73
End: 2018-05-09

## 2018-05-09 VITALS
DIASTOLIC BLOOD PRESSURE: 56 MMHG | RESPIRATION RATE: 20 BRPM | HEART RATE: 64 BPM | SYSTOLIC BLOOD PRESSURE: 132 MMHG | TEMPERATURE: 97.4 F | OXYGEN SATURATION: 97 % | BODY MASS INDEX: 37.72 KG/M2 | WEIGHT: 269.4 LBS | HEIGHT: 71 IN

## 2018-05-09 DIAGNOSIS — I10 ESSENTIAL HYPERTENSION: ICD-10-CM

## 2018-05-09 DIAGNOSIS — E11.65 UNCONTROLLED TYPE 2 DIABETES MELLITUS WITH HYPERGLYCEMIA, WITH LONG-TERM CURRENT USE OF INSULIN (HCC): Primary | ICD-10-CM

## 2018-05-09 DIAGNOSIS — Z79.4 UNCONTROLLED TYPE 2 DIABETES MELLITUS WITH HYPERGLYCEMIA, WITH LONG-TERM CURRENT USE OF INSULIN (HCC): Primary | ICD-10-CM

## 2018-05-09 DIAGNOSIS — E78.2 MIXED HYPERLIPIDEMIA: ICD-10-CM

## 2018-05-09 DIAGNOSIS — E66.01 MORBID OBESITY DUE TO EXCESS CALORIES (HCC): ICD-10-CM

## 2018-05-09 DIAGNOSIS — Z79.4 ENCOUNTER FOR LONG-TERM (CURRENT) USE OF INSULIN (HCC): ICD-10-CM

## 2018-05-09 NOTE — PATIENT INSTRUCTIONS
Refills -Please call your pharmacy and have them send us a refill request.  Results - Allow up to a week for lab results to be processed and reviewed. Phone calls - Allow up to 24 hours for non-urgent calls to be returned. Prior Authorization - May take up to 2 weeks to process depending on your insurance. Forms - FMLA, DMV, Patient Assistance, etc. will take up to 2 weeks to process. Cancellations - Please notify the office in advance if you cannot keep your appointment. Samples - Will only be dispensed at visits as supplies are limited.      If you are having a medical emergency, call 911.      ----------------------------------------------------------------------------------------------------------------------    bydureon 2 mg once  A week ( optional)      Check blood sugars immediately before each meal and at bedtime     NPH insulin 70 units at bed time ( less expensive  Go to   [facility])    Take prandin  2 mg right before b-fast and lunch   OR   Humulin regular   Insulin  10 units  with dinner ,  but you will not take prandin before that meal        CARB max is 30 gm per meal   Less than 70 mg NO INSULIN        Do not skip meals  Do not eat in between meals  Reduce carbs- pasta, rice, potatoes, bread   Do not drink juices or sodas  Do not eat peanut butter     Do not eat sugar free cookies and cakes   Do not eat grapes, ornages, pine apple and peaches , raisins

## 2018-05-09 NOTE — MR AVS SNAPSHOT
[address] [facility] [address] 
[phone] Patient: Emeterio Neal MRN: [id number] Visit Information Date & Time Provider Department Dept. Phone Encounter #  
 5/9/2018  9:15 AM Wil Jay MD [facility] [phone] [id number] Follow-up Instructions Return in about 3 months (around 8/9/2018). Upcoming Health Maintenance Date Due  
 FOOT EXAM Q1 1/14/1954 DTaP/Tdap/Td series (1 - Tdap) 1/14/1965 FOBT Q 1 YEAR AGE 50-75 1/14/1994 ZOSTER VACCINE AGE 60> 11/14/2003 GLAUCOMA SCREENING Q2Y 1/14/2009 Pneumococcal 65+ Low/Medium Risk (1 of 2 - PCV13) 1/14/2009 EYE EXAM RETINAL OR DILATED Q1 6/30/2015 MEDICARE YEARLY EXAM 3/14/2018 Influenza Age 5 to Adult 8/1/2018 HEMOGLOBIN A1C Q6M 11/2/2018 MICROALBUMIN Q1 5/2/2019 LIPID PANEL Q1 5/2/2019 Allergies as of 5/9/2018  Review Complete On: 5/9/2018 By: Wil Jay MD  
  
 Severity Noted Reaction Type Reactions Sulfa (Sulfonamide Antibiotics)  09/08/2010    Unable to Obtain Current Immunizations  Never Reviewed No immunizations on file. Not reviewed this visit You Were Diagnosed With   
  
 Codes Comments Uncontrolled type 2 diabetes mellitus with hyperglycemia, with long-term current use of insulin (HCC)    -  Primary ICD-10-CM: E11.65, Z79.4 ICD-9-CM: 250.02, V58.67 Morbid obesity due to excess calories (HCC)     ICD-10-CM: E66.01 
ICD-9-CM: 278.01 Encounter for long-term (current) use of insulin ([facility]     ICD-10-CM: Z79.4 ICD-9-CM: V58.67 Essential hypertension     ICD-10-CM: I10 
ICD-9-CM: 401.9 Mixed hyperlipidemia     ICD-10-CM: E78.2 ICD-9-CM: 272.2 Vitals BP Pulse Temp Resp Height(growth percentile) Weight(growth percentile)  132/56 (BP 1 Location: Right arm, BP Patient Position: Sitting) 64 97.4 °F (36.3 °C) (Oral) 20 5' 11\" (1.803 m) 269 lb 6.4 oz (122.2 kg) SpO2 BMI Smoking Status 97% 37.57 kg/m2 Never Smoker Vitals History BMI and BSA Data Body Mass Index Body Surface Area  
 37.57 kg/m 2 2.47 m 2 Preferred Pharmacy Pharmacy Name Phone [address] [facility] [place] [address] [place] [address] Sorin Gillespie [phone] Your Updated Medication List  
  
   
This list is accurate as of 5/9/18  9:41 AM.  Always use your most recent med list.  
  
  
  
  
 AMITRIPTYLINE PO Take 25 mg by mouth nightly. * COREG 12.5 mg tablet Generic drug:  carvedilol Take 25 mg by mouth two (2) times daily (with meals). * carvedilol 25 mg tablet Commonly known as:  COREG  
  
 empagliflozin 10 mg tablet Commonly known as:  Brant Nataly Take 1 Tab by mouth Daily (before breakfast). exenatide microspheres 2 mg/0.65 mL Pnij Commonly known as:  BYDUREON  
2 mg by SubCUTAneous route every seven (7) days. glucose blood VI test strips strip Commonly known as:  TRUETEST TEST STRIPS Test 4 times daily. Dx code E11.65 Insulin Needles (Disposable) 32 gauge x 5/32\" Ndle Commonly known as:  Carolann Pen Needle Use once daily. Dx code E11.65  
  
 insulin  unit/mL injection Commonly known as:  NovoLIN N NPH U-100 Insulin Inject 70 units at bedtime. insulin regular 100 unit/mL injection Commonly known as:  Alvena Amy, HUMULIN R Inject 10 units before dinner  
  
 insulin syringe-needle U-100 1 mL 31 gauge x 15/64\" Syrg Commonly known as:  BD INSULIN SYRINGE ULTRA-FINE  
1 Syringe by SubCUTAneous route two (2) times a day. Dx code E11.65  
  
 lancets 28 gauge Misc Commonly known as:  Severo Alvarez Test 4 times daily. Dx code E11.65  
  
 LASIX 80 mg tablet Generic drug:  furosemide Take  by mouth daily. lisinopril 20 mg tablet Commonly known as:  Corona Sean Take  by mouth daily. magnesium oxide 400 mg tablet Commonly known as:  MAG-OX Take 400 mg by mouth daily. metFORMIN 1,000 mg tablet Commonly known as:  GLUCOPHAGE  
TAKE 1 TABLET TWICE A DAYWITH MEALS  
  
 MOBIC PO Take 15 mg by mouth as needed. penicillin v potassium 250 mg tablet Commonly known as:  VEETID Take  by mouth two (2) times a day. PRADAXA 150 mg capsule Generic drug:  dabigatran etexilate Take  by mouth every twelve (12) hours. repaglinide 2 mg tablet Commonly known as:  PRANDIN  
TAKE 1 TABLET BEFORE BREAKFAST, LUNCH AN DINNER  
  
 simvastatin 40 mg tablet Commonly known as:  ZOCOR Take 20 mg by mouth nightly. TIKOSYN 250 mcg capsule Generic drug:  dofetilide Take 250 mcg by mouth two (2) times a day. traMADol 50 mg tablet Commonly known as:  ULTRAM  
Take 50 mg by mouth daily as needed. * Notice: This list has 2 medication(s) that are the same as other medications prescribed for you. Read the directions carefully, and ask your doctor or other care provider to review them with you. We Performed the Following  DIABETES FOOT EXAM [HM7 Custom] REFERRAL TO DIABETIC EDUCATION [REF20 Custom] Comments:  
 Pt has long h/o diabetes and is almost needing insulin meal time , which he has been deferring for long. He needs to be taught on 30 gm per meal of carbs to stay on pills He is on hs insulin Follow-up Instructions Return in about 3 months (around 8/9/2018). Referral Information Referral ID Referred By Referred To  
  
 [id number] Shaji Ruiz Not Available Visits Status Start Date End Date 1 New Request 5/9/18 5/9/19 If your referral has a status of pending review or denied, additional information will be sent to support the outcome of this decision. Patient Instructions Refills -Please call your pharmacy and have them send us a refill request. 
 Results - Allow up to a week for lab results to be processed and reviewed. Phone calls - Allow up to 24 hours for non-urgent calls to be returned. Prior Authorization - May take up to 2 weeks to process depending on your insurance. Forms - FMLA, DMV, Patient Assistance, etc. will take up to 2 weeks to process. Cancellations - Please notify the office in advance if you cannot keep your appointment. Samples - Will only be dispensed at visits as supplies are limited. If you are having a medical emergency, call 911. 
 
 
---------------------------------------------------------------------------------------------------------------------- 
 
bydureon 2 mg once  A week ( optional) Check blood sugars immediately before each meal and at bedtime NPH insulin 70 units at bed time ( less expensive  Go to   [facility]) Take prandin  2 mg right before b-fast and lunch OR Humulin regular   Insulin  10 units  with dinner ,  but you will not take prandin before that meal   
 
 
CARB max is 30 gm per meal  
Less than 70 mg NO INSULIN 
 
 
 
Do not skip meals Do not eat in between meals Reduce carbs- pasta, rice, potatoes, bread Do not drink juices or sodas Do not eat peanut butter Do not eat sugar free cookies and cakes Do not eat grapes, ornages, pine apple and peaches , raisins Introducing [facility] & HEALTH SERVICES! Abel Shoemaker introduces [facility] patient portal. Now you can access parts of your medical record, email your doctor's office, and request medication refills online. 1. In your internet browser, go to [email or web address] 2. Click on the First Time User? Click Here link in the Sign In box. You will see the New Member Sign Up page. 3. Enter your [facility] Access Code exactly as it appears below. You will not need to use this code after youve completed the sign-up process. If you do not sign up before the expiration date, you must request a new code. · [employer] Access Code: [email or web address] Expires: 8/7/2018  9:38 AM 
 
4. Enter the last four digits of your Social Security Number (xxxx) and Date of Birth (mm/dd/yyyy) as indicated and click Submit. You will be taken to the next sign-up page. 5. Create a [facility] ID. This will be your [facility] login ID and cannot be changed, so think of one that is secure and easy to remember. 6. Create a [facility] password. You can change your password at any time. 7. Enter your Password Reset Question and Answer. This can be used at a later time if you forget your password. 8. Enter your e-mail address. You will receive e-mail notification when new information is available in [address] 9. Click Sign Up. You can now view and download portions of your medical record. 10. Click the Download Summary menu link to download a portable copy of your medical information. If you have questions, please visit the Frequently Asked Questions section of the [employer] website. Remember, [facility] is NOT to be used for urgent needs. For medical emergencies, dial 911. Now available from your iPhone and Android! Please provide this summary of care documentation to your next provider. Your primary care clinician is listed as Ludy Monahan. If you have any questions after today's visit, please call [phone].

## 2018-05-09 NOTE — PROGRESS NOTES
Wt Readings from Last 3 Encounters:   05/09/18 269 lb 6.4 oz (122.2 kg)   11/02/17 277 lb (125.6 kg)   07/01/17 278 lb 12.8 oz (126.5 kg)     Temp Readings from Last 3 Encounters:   05/09/18 97.4 °F (36.3 °C) (Oral)   11/02/17 98.2 °F (36.8 °C) (Oral)   07/01/17 98.4 °F (36.9 °C) (Oral)     BP Readings from Last 3 Encounters:   05/09/18 132/56   11/02/17 132/63   07/01/17 116/54     Pulse Readings from Last 3 Encounters:   05/09/18 64   11/02/17 65   07/01/17 74     Lab Results   Component Value Date/Time    Hemoglobin A1c 8.3 (H) 05/02/2018 08:51 AM    Hemoglobin A1c (POC) 7.9 07/01/2017 09:14 AM     Patient does not have meter today.

## 2018-05-09 NOTE — PROGRESS NOTES
HISTORY OF PRESENT ILLNESS   Benedict Tabares is a 75  y.o. male. HPI   Patient here for follow up of Type 2 diabetes mellitus after his last visit in Nov 2017     Lost 8  lbs   forgot the meter , but brought it the next day     denies lack of  compliance with diet  Not able to take bydureon because of donut hole and he just got into catastrophic coverage     He has not started on humalog and he stayed on prandin  He appears not  depressed today        Prior DM history-   H/o DM 2 for 11 yrs. Discontinued victoza sec to diarrhoea and higher co-pay. Started back on Byetta   Current diabetic medications include Metformin, glipizide and Byetta. Current monitoring regimen: home blood tests - 2 times daily   Home blood sugar records: fasting range: 140-10mg postprandial range: 130-160 mg   Post prandial more than 300 mg   Current exercise: no regular exercise   Known diabetic complications: peripheral neuropathy and cardiovascular diseases/p CABG         Past Medical History:   Diagnosis Date    Arthritis     CAD (coronary artery disease) of artery bypass graft CABG in jan 2003    dr. Mendel Abed Cellulitis        Social History     Social History    Marital status:      Spouse name: N/A    Number of children: N/A    Years of education: N/A     Occupational History    Not on file. Social History Main Topics    Smoking status: Never Smoker    Smokeless tobacco: Never Used    Alcohol use No    Drug use: No    Sexual activity: Not on file     Other Topics Concern    Not on file     Social History Narrative       History reviewed. No pertinent family history. Review of Systems   Constitutional: Negative. HENT: Negative. Eyes: Negative for pain and redness. Respiratory: Negative. Cardiovascular: Negative for chest pain, palpitations and leg swelling. Gastrointestinal: Negative. Negative for constipation. Genitourinary: Negative. Musculoskeletal: Negative for myalgias.    Skin: Negative. Neurological: Negative. Endo/Heme/Allergies: Negative. Psychiatric/Behavioral: Negative for depression and memory loss. The patient does not have insomnia. Physical Exam   Constitutional: He is oriented to person, place, and time. He appears well-developed and well-nourished. HENT:   Head: Normocephalic. Eyes: Conjunctivae and extraocular motions are normal. Pupils are equal, round, and reactive to light. Neck: Normal range of motion. Neck supple. No JVD present. No tracheal deviation present. No thyromegaly present. Cardiovascular: Normal rate, regular rhythm and normal heart sounds. Pulmonary/Chest: Effort normal and breath sounds normal.   Abdominal: Soft. Bowel sounds are normal.   Musculoskeletal: Normal range of motion. Lymphadenopathy:   He has no cervical adenopathy. Neurological: He is alert and oriented to person, place, and time. He has normal reflexes. Skin: Skin is warm. Psychiatric: He has a normal mood and affect.      Diabetic foot exam:  May 2018     Left: Reflexes nd     Vibratory sensation diminished    Proprioception nd   Sharp/dull discrimination nd    Filament test reduced sensation with micro filament   Pulse DP: 2+ (normal)   Pulse PT: nd   Deformities: Yes - onychomycosis     Right: Reflexes nd   Vibratory sensation diminished   Proprioception nd   Sharp/dull discrimination nd   Filament test reduced sensation with micro filament   Pulse DP: 2+ (normal)   Pulse PT: nd   Deformities: Yes - left toe onychomycosis, second toe lateral deviated         Lab Results   Component Value Date/Time    Hemoglobin A1c 8.3 (H) 05/02/2018 08:51 AM    Hemoglobin A1c 8.0 (H) 10/26/2017 09:30 AM    Hemoglobin A1c 8.3 (H) 03/07/2017 08:33 AM    Microalb/Creat ratio (ug/mg creat.) 5.0 05/02/2018 08:51 AM    LDL, calculated 72 05/02/2018 08:51 AM    Creatinine 1.41 (H) 05/02/2018 08:51 AM      Lab Results   Component Value Date/Time    Cholesterol, total 147 05/02/2018 08:51 AM    HDL Cholesterol 38 (L) 05/02/2018 08:51 AM    LDL, calculated 72 05/02/2018 08:51 AM    Triglyceride 186 (H) 05/02/2018 08:51 AM     Lab Results   Component Value Date/Time    AST (SGOT) 16 05/02/2018 08:51 AM    Alk. phosphatase 75 05/02/2018 08:51 AM     Lab Results   Component Value Date/Time    GFR est AA 56 (L) 05/02/2018 08:51 AM    GFR est non-AA 49 (L) 05/02/2018 08:51 AM    Creatinine 1.41 (H) 05/02/2018 08:51 AM    BUN 28 (H) 05/02/2018 08:51 AM    Sodium 137 05/02/2018 08:51 AM    Potassium 4.8 05/02/2018 08:51 AM    Chloride 96 05/02/2018 08:51 AM    CO2 24 05/02/2018 08:51 AM              ASSESSMENT and PLAN       1. Type 2 diabetes mellitus, uncontrolled. A1C is  8.3 %     From may 2018    Compared to   8 %     From oct 29 2017    compared to  7.9 %     From     July 1 2017   8.3   %    From  March 2016   Compared to  8.2 %     From  Dec 2016   comapred to 8.8 %    From sept 2016  compared to  8.1 %     From march 2015 compared to    8 %    From dec 2014  Compared to    7 %  From May 2014 compared to  7.4 % from feb 2014 compared to 7.3 % from nov 2013 compared to  6.8 % from June 2013 compared to  8 % march 2013 ; compared to  7.1 % July 2012 compared to 7 % compared to 6.9 % from oct 2011     plateaued  glycemic control  , with dietary non-compliance     He went into [facility] early May     The log is showing good  fasting sugars   He is not taking humalog with dinner      The patient is told to continue on Glucophage  And  On and off on bydureon by cost and coverage   Could not tolerate victoza as he had GI distress     Has not been taking humalog  10 units with dinner     But encouraging to take humalog with b-fast and lunch And no prandin   Please check 2 hours dinner           2. Hypertension. Blood pressure is well controlled on lisinopril, HCTZ and carvedilol. 3. Dyslipidemia. Zocor 40 mg is being continued. 4.  PAD : normal BRIDGER     5. CAD/CABG : 2003-  afib- s/p cardioablation- on pradexa  And Tikosyn   F/u with dr. Sonal Basilio and Dr Jose Manuel Ferrera     6  Hypogonadism - from june 2013 labs , hypergonadotropic, and fatigue is from low T     7. Obesity : Body mass index is 37.57 kg/(m^2).   TLC advised   bydureon optional by cost       > 50 % of time is spent on counseling   Patient voiced understanding her plan of care

## 2018-05-10 RX ORDER — REPAGLINIDE 2 MG/1
TABLET ORAL
Qty: 180 TAB | Refills: 4
Start: 2018-05-10 | End: 2018-06-12 | Stop reason: SDUPTHER

## 2018-06-12 RX ORDER — REPAGLINIDE 2 MG/1
TABLET ORAL
Qty: 90 TAB | Refills: 11 | Status: SHIPPED | OUTPATIENT
Start: 2018-06-12 | End: 2018-12-21 | Stop reason: SDUPTHER

## 2018-06-18 RX ORDER — METFORMIN HYDROCHLORIDE 1000 MG/1
TABLET ORAL
Qty: 60 TAB | Refills: 6 | Status: SHIPPED | OUTPATIENT
Start: 2018-06-18 | End: 2018-12-21 | Stop reason: SINTOL

## 2018-08-24 ENCOUNTER — HOSPITAL ENCOUNTER (OUTPATIENT)
Dept: LAB | Age: 73
Discharge: HOME | End: 2018-08-24
Payer: MEDICARE

## 2018-08-24 DIAGNOSIS — I48.91 ATRIAL FIBRILLATION, UNSPECIFIED TYPE (HCC): Primary | ICD-10-CM

## 2018-08-24 PROCEDURE — 83735 ASSAY OF MAGNESIUM: CPT

## 2018-08-24 PROCEDURE — 80053 COMPREHEN METABOLIC PANEL: CPT

## 2018-08-24 PROCEDURE — 80061 LIPID PANEL: CPT

## 2018-08-24 PROCEDURE — 83036 HEMOGLOBIN GLYCOSYLATED A1C: CPT

## 2018-08-24 PROCEDURE — 82043 UR ALBUMIN QUANTITATIVE: CPT

## 2018-08-24 PROCEDURE — 36415 COLL VENOUS BLD VENIPUNCTURE: CPT

## 2018-08-28 LAB — MAGNESIUM SERPL-MCNC: 2.2 MG/DL (ref 1.6–2.3)

## 2018-08-31 ENCOUNTER — OFFICE VISIT (OUTPATIENT)
Dept: ENDOCRINOLOGY | Age: 73
End: 2018-08-31

## 2018-08-31 VITALS
WEIGHT: 272.8 LBS | HEART RATE: 67 BPM | TEMPERATURE: 97 F | DIASTOLIC BLOOD PRESSURE: 56 MMHG | OXYGEN SATURATION: 96 % | HEIGHT: 71 IN | SYSTOLIC BLOOD PRESSURE: 136 MMHG | BODY MASS INDEX: 38.19 KG/M2 | RESPIRATION RATE: 18 BRPM

## 2018-08-31 DIAGNOSIS — Z79.4 UNCONTROLLED TYPE 2 DIABETES MELLITUS WITH HYPERGLYCEMIA, WITH LONG-TERM CURRENT USE OF INSULIN (HCC): Primary | ICD-10-CM

## 2018-08-31 DIAGNOSIS — Z79.4 ENCOUNTER FOR LONG-TERM (CURRENT) USE OF INSULIN (HCC): ICD-10-CM

## 2018-08-31 DIAGNOSIS — I10 ESSENTIAL HYPERTENSION: ICD-10-CM

## 2018-08-31 DIAGNOSIS — E66.01 MORBID OBESITY DUE TO EXCESS CALORIES (HCC): ICD-10-CM

## 2018-08-31 DIAGNOSIS — E11.65 UNCONTROLLED TYPE 2 DIABETES MELLITUS WITH HYPERGLYCEMIA, WITH LONG-TERM CURRENT USE OF INSULIN (HCC): Primary | ICD-10-CM

## 2018-08-31 DIAGNOSIS — E78.2 MIXED HYPERLIPIDEMIA: ICD-10-CM

## 2018-08-31 NOTE — MR AVS SNAPSHOT
[address] [facility] [address] 
[phone] Patient: Antwon Liang MRN: [id number] Visit Information Date & Time Provider Department Dept. Phone Encounter #  
 8/31/2018 10:30 AM Christiano Shaw MD [facility] [place] [facility] [phone] [id number] Follow-up Instructions Return in about 4 months (around 12/31/2018). Upcoming Health Maintenance Date Due DTaP/Tdap/Td series (1 - Tdap) 1/14/1965 FOBT Q 1 YEAR AGE 50-75 1/14/1994 ZOSTER VACCINE AGE 60> 11/14/2003 GLAUCOMA SCREENING Q2Y 1/14/2009 Pneumococcal 65+ Low/Medium Risk (1 of 2 - PCV13) 1/14/2009 EYE EXAM RETINAL OR DILATED Q1 6/30/2015 MEDICARE YEARLY EXAM 3/14/2018 Influenza Age 5 to Adult 8/1/2018 HEMOGLOBIN A1C Q6M 2/24/2019 FOOT EXAM Q1 5/9/2019 MICROALBUMIN Q1 8/24/2019 LIPID PANEL Q1 8/24/2019 Allergies as of 8/31/2018  Review Complete On: 8/31/2018 By: Christiano Shaw MD  
  
 Severity Noted Reaction Type Reactions Sulfa (Sulfonamide Antibiotics)  09/08/2010    Unable to Obtain Current Immunizations  Never Reviewed No immunizations on file. Not reviewed this visit You Were Diagnosed With   
  
 Codes Comments Uncontrolled type 2 diabetes mellitus with hyperglycemia, with long-term current use of insulin (HCC)    -  Primary ICD-10-CM: E11.65, Z79.4 ICD-9-CM: 250.02, V58.67 Morbid obesity due to excess calories (HCC)     ICD-10-CM: E66.01 
ICD-9-CM: 278.01 [facility]     ICD-10-CM: Z79.4 ICD-9-CM: V58.67 Essential hypertension     ICD-10-CM: I10 
ICD-9-CM: 401.9 Mixed hyperlipidemia     ICD-10-CM: E78.2 ICD-9-CM: 272.2 Vitals BP Pulse Temp Resp Height(growth percentile) Weight(growth percentile)  136/56 (BP 1 Location: Left arm, BP Patient Position: Sitting) 67 97 °F (36.1 °C) (Oral) 18 5' 11\" (1.803 m) 272 lb 12.8 oz (123.7 kg) SpO2 BMI Smoking Status 96% 38.05 kg/m2 Never Smoker BMI and BSA Data Body Mass Index Body Surface Area 38.05 kg/m 2 2.49 m 2 Preferred Pharmacy Pharmacy Name Phone [address] [place], [place] [address] [phone] Your Updated Medication List  
  
   
This list is accurate as of 8/31/18 11:33 AM.  Always use your most recent med list.  
  
  
  
  
 carvedilol 25 mg tablet Commonly known as:  COREG  
  
 glucose blood VI test strips strip Commonly known as:  TRUETEST TEST STRIPS Test 4 times daily. Dx code E11.65 Insulin Needles (Disposable) 32 gauge x 5/32\" Ndle Commonly known as:  Carolann Pen Needle Use once daily. Dx code E11.65  
  
 insulin  unit/mL injection Commonly known as:  NovoLIN N NPH U-100 Insulin Inject 70 units at bedtime. insulin regular 100 unit/mL injection Commonly known as:  Larina Yarsanism, HUMULIN R Inject 10 units before dinner  
  
 insulin syringe-needle U-100 1 mL 31 gauge x 15/64\" Syrg Commonly known as:  BD INSULIN SYRINGE ULTRA-FINE  
1 Syringe by SubCUTAneous route two (2) times a day. Dx code E11.65  
  
 lancets 28 gauge Misc Commonly known as:  Chinmay Osier Test 4 times daily. Dx code E11.65  
  
 LASIX 80 mg tablet Generic drug:  furosemide Take  by mouth daily. lisinopril 20 mg tablet Commonly known as:  Beth Reasons Take  by mouth daily. magnesium oxide 400 mg tablet Commonly known as:  MAG-OX Take 400 mg by mouth daily. metFORMIN 1,000 mg tablet Commonly known as:  GLUCOPHAGE  
TAKE 1 TABLET TWICE A DAYWITH MEALS  
  
 MOBIC PO Take 15 mg by mouth as needed. penicillin v potassium 250 mg tablet Commonly known as:  VEETID Take  by mouth two (2) times a day. PRADAXA 150 mg capsule Generic drug:  dabigatran etexilate Take  by mouth every twelve (12) hours. repaglinide 2 mg tablet Commonly known as:  PRANDIN  
TAKE 1 TABLET 3 TIMES A DAY WITH BREAKFAST, LUNCHAND DINNER  
  
 simvastatin 40 mg tablet Commonly known as:  ZOCOR Take 20 mg by mouth nightly. TIKOSYN 250 mcg capsule Generic drug:  dofetilide Take 250 mcg by mouth two (2) times a day. Follow-up Instructions Return in about 4 months (around 12/31/2018). Patient Instructions Refills -Please call your pharmacy and have them send us a refill request. 
Results - Allow up to a week for lab results to be processed and reviewed. Phone calls - Allow up to 24 hours for non-urgent calls to be returned. Prior Authorization - May take up to 2 weeks to process depending on your insurance. Forms - FMLA, DMV, Patient Assistance, etc. will take up to 2 weeks to process. Cancellations - Please notify the office in advance if you cannot keep your appointment. Samples - Will only be dispensed at visits as supplies are limited. If you are having a medical emergency, call 911. 
 
 
---------------------------------------------------------------------------------------------------------------------- 
 
bydureon 2 mg once  A week ( optional) Check blood sugars immediately before each meal and at bedtime NPH insulin 70 units at bed time ( less expensive  Go to   [facility]) Take prandin  2 mg right before b-fast and lunch  And humulin regular   Insulin  10 units  with dinner ,  but you will not take prandin before that meal   
 
 
CARB max is 30 gm per meal  
Less than 70 mg NO INSULIN 
 
 
 
Do not skip meals Do not eat in between meals Reduce carbs- pasta, rice, potatoes, bread Do not drink juices or sodas Do not eat peanut butter Do not eat sugar free cookies and cakes Do not eat grapes, ornages, pine apple and peaches , raisins Introducing [facility] & HEALTH SERVICES! [employer] introduces [facility] patient portal. Now you can access parts of your medical record, email your doctor's office, and request medication refills online. 1. In your internet browser, go to [email or web address] 2. Click on the First Time User? Click Here link in the Sign In box. You will see the New Member Sign Up page. 3. Enter your [facility] Access Code exactly as it appears below. You will not need to use this code after youve completed the sign-up process. If you do not sign up before the expiration date, you must request a new code. · [facility] Access Code: [email or web address] Expires: 11/29/2018 11:33 AM 
 
4. Enter the last four digits of your Social Security Number (xxxx) and Date of Birth (mm/dd/yyyy) as indicated and click Submit. You will be taken to the next sign-up page. 5. Create a [facility] ID. This will be your [facility] login ID and cannot be changed, so think of one that is secure and easy to remember. 6. Create a [facility] password. You can change your password at any time. 7. Enter your Password Reset Question and Answer. This can be used at a later time if you forget your password. 8. Enter your e-mail address. You will receive e-mail notification when new information is available in [address] 9. Click Sign Up. You can now view and download portions of your medical record. 10. Click the Download Summary menu link to download a portable copy of your medical information. If you have questions, please visit the Frequently Asked Questions section of the [facility] website. Remember, [facility] is NOT to be used for urgent needs. For medical emergencies, dial 911. Now available from your iPhone and Android! Please provide this summary of care documentation to your next provider. Your primary care clinician is listed as Rivera Jaime. If you have any questions after today's visit, please call [phone].

## 2018-08-31 NOTE — PATIENT INSTRUCTIONS
Refills -Please call your pharmacy and have them send us a refill request. 
Results - Allow up to a week for lab results to be processed and reviewed. Phone calls - Allow up to 24 hours for non-urgent calls to be returned. Prior Authorization - May take up to 2 weeks to process depending on your insurance. Forms - FMLA, DMV, Patient Assistance, etc. will take up to 2 weeks to process. Cancellations - Please notify the office in advance if you cannot keep your appointment. Samples - Will only be dispensed at visits as supplies are limited. If you are having a medical emergency, call 911. 
 
 
---------------------------------------------------------------------------------------------------------------------- 
 
bydureon 2 mg once  A week ( optional) Check blood sugars immediately before each meal and at bedtime NPH insulin 70 units at bed time ( less expensive  Go to   [employer] [facility]) Take prandin  2 mg right before b-fast and lunch  And humulin regular   Insulin  10 units  with dinner ,  but you will not take prandin before that meal   
 
 
CARB max is 30 gm per meal  
Less than 70 mg NO INSULIN 
 
 
 
Do not skip meals Do not eat in between meals Reduce carbs- pasta, rice, potatoes, bread Do not drink juices or sodas Do not eat peanut butter Do not eat sugar free cookies and cakes Do not eat grapes, ornages, pine apple and peaches , raisins

## 2018-08-31 NOTE — PROGRESS NOTES
HISTORY OF PRESENT ILLNESS Cristiano Lucio is a 75  y.o. male. HPI Patient here for follow up of Type 2 diabetes mellitus after his last visit in May 9 2018 Gained 3  lbs  
forgot the meter , at the visit  ( he is always forget ful ) 
 
denies lack of  compliance with diet Not able to take bydureon because of donut hole and he just got into catastrophic coverage He has not started on humalog and he stayed on prandin He appears not  depressed today Prior DM history-  
H/o DM 2 for 11 yrs. Discontinued victoza sec to diarrhoea and higher co-pay. Started back on Byetta Current diabetic medications include Metformin, glipizide and Byetta. Current monitoring regimen: home blood tests - 2 times daily Home blood sugar records: fasting range: 140-10mg postprandial range: 130-160 mg  
Post prandial more than 300 mg  
Current exercise: no regular exercise Known diabetic complications: peripheral neuropathy and cardiovascular diseases/p CABG Past Medical History:  
Diagnosis Date  Arthritis  CAD (coronary artery disease) of artery bypass graft CABG in jan 2003  
 dr. Aguilar Staff  Cellulitis Social History Social History  Marital status:  Spouse name: N/A  
 Number of children: N/A  
 Years of education: N/A Occupational History  Not on file. Social History Main Topics  Smoking status: Never Smoker  Smokeless tobacco: Never Used  Alcohol use No  
 Drug use: No  
 Sexual activity: Not on file Other Topics Concern  Not on file Social History Narrative History reviewed. No pertinent family history. Review of Systems Constitutional: Negative. HENT: Negative. Eyes: Negative for pain and redness. Respiratory: Negative. Cardiovascular: Negative for chest pain, palpitations and leg swelling. Gastrointestinal: Negative. Negative for constipation. Genitourinary: Negative. Musculoskeletal: Negative for myalgias. Skin: Negative. Neurological: Negative. Endo/Heme/Allergies: Negative. Psychiatric/Behavioral: Negative for depression and memory loss. The patient does not have insomnia. Physical Exam  
Constitutional: He is oriented to person, place, and time. He appears well-developed and well-nourished. HENT:  
Head: Normocephalic. Eyes: Conjunctivae and extraocular motions are normal. Pupils are equal, round, and reactive to light. Neck: Normal range of motion. Neck supple. No JVD present. No tracheal deviation present. No thyromegaly present. Cardiovascular: Normal rate, regular rhythm and normal heart sounds. Pulmonary/Chest: Effort normal and breath sounds normal.  
Abdominal: Soft. Bowel sounds are normal.  
Musculoskeletal: Normal range of motion. Lymphadenopathy:  
He has no cervical adenopathy. Neurological: He is alert and oriented to person, place, and time. He has normal reflexes. Skin: Skin is warm. Psychiatric: He has a normal mood and affect. Diabetic foot exam:  May 2018 Left: Reflexes nd Vibratory sensation diminished Proprioception nd Sharp/dull discrimination nd Filament test reduced sensation with micro filament Pulse DP: 2+ (normal) Pulse PT: nd 
 Deformities: Yes - onychomycosis Right: Reflexes nd Vibratory sensation diminished Proprioception nd Sharp/dull discrimination nd Filament test reduced sensation with micro filament Pulse DP: 2+ (normal) Pulse PT: nd 
 Deformities: Yes - left toe onychomycosis, second toe lateral deviated Lab Results Component Value Date/Time Hemoglobin A1c 7.7 (H) 08/24/2018 08:50 AM  
 Hemoglobin A1c 8.3 (H) 05/02/2018 08:51 AM  
 Hemoglobin A1c 8.0 (H) 10/26/2017 09:30 AM  
 Microalb/Creat ratio (ug/mg creat.) <2.6 08/24/2018 08:50 AM  
 LDL, calculated 59 08/24/2018 08:50 AM  
 Creatinine 0.87 08/24/2018 08:50 AM  
  
Lab Results Component Value Date/Time Cholesterol, total 123 08/24/2018 08:50 AM  
 HDL Cholesterol 41 08/24/2018 08:50 AM  
 LDL, calculated 59 08/24/2018 08:50 AM  
 Triglyceride 115 08/24/2018 08:50 AM  
 
Lab Results Component Value Date/Time AST (SGOT) 13 08/24/2018 08:50 AM  
 Alk. phosphatase 68 08/24/2018 08:50 AM  
 
Lab Results Component Value Date/Time GFR est AA 98 08/24/2018 08:50 AM  
 GFR est non-AA 85 08/24/2018 08:50 AM  
 Creatinine 0.87 08/24/2018 08:50 AM  
 BUN 14 08/24/2018 08:50 AM  
 Sodium 136 08/24/2018 08:50 AM  
 Potassium 4.7 08/24/2018 08:50 AM  
 Chloride 100 08/24/2018 08:50 AM  
 CO2 24 08/24/2018 08:50 AM  
  
 
 
 
 
ASSESSMENT and PLAN 1. Type 2 diabetes mellitus, uncontrolled. A1C is  7.7 %      From     August 2018    Compared to   8.3 %     From may 2018    Compared to   8 %     From oct 29 2017    compared to  7.9 %     From     July 1 2017   8.3   %    From  March 2016   Compared to  8.2 %     From  Dec 2016   comapred to 8.8 %    From sept 2016  compared to  8.1 %     From march 2015 compared to    8 %    From dec 2014  Compared to    7 %  From May 2014 compared to  7.4 % from feb 2014 compared to 7.3 % from nov 2013 compared to  6.8 % from June 2013 compared to  8 % march 2013 ; compared to  7.1 % July 2012 compared to 7 % compared to 6.9 % from oct 2011  
 
improved  glycemic control  , with dietary compliance He went into [employer] [facility] early May The log is showing good  fasting sugars He is not taking humalog with dinner The patient is told to continue on Glucophage  And On and off on bydureon by cost and coverage Could not tolerate victoza as he had GI distress Has not been taking humalog  10 units with dinner But encouraging to take humalog with b-fast and lunch And no prandin Please check 2 hours dinner 2. Hypertension. Blood pressure is well controlled on lisinopril, HCTZ and carvedilol. 3. Dyslipidemia. Zocor 40 mg is being continued. 4. PAD : normal BRIDGER 5. CAD/CABG : 2003-  afib- s/p cardioablation- on pradexa  And Tikosyn F/u with dr. Myranda Lu and Dr Jose Manuel Hargrove [facility]   Obesity : Body mass index is 38.05 kg/(m^2). TLC advised  
bydureon optional by cost  
 
 
> 50 % of time is spent on counseling Patient voiced understanding her plan of care

## 2018-08-31 NOTE — PROGRESS NOTES
1. Have you been to the ER, urgent care clinic since your last visit? No Hospitalized since your last visit? No 
 
2. Have you seen or consulted any other health care providers outside of the [facility] since your last visit? Yes Wt Readings from Last 3 Encounters:  
08/31/18 272 lb 12.8 oz (123.7 kg) 05/09/18 269 lb 6.4 oz (122.2 kg) 11/02/17 277 lb (125.6 kg) Temp Readings from Last 3 Encounters:  
08/31/18 97 °F (36.1 °C) (Oral) 05/09/18 97.4 °F (36.3 °C) (Oral) 11/02/17 98.2 °F (36.8 °C) (Oral) BP Readings from Last 3 Encounters:  
08/31/18 136/56  
05/09/18 132/56  
11/02/17 132/63 Pulse Readings from Last 3 Encounters:  
08/31/18 67  
05/09/18 64  
11/02/17 65 Lab Results Component Value Date/Time Hemoglobin A1c 7.7 (H) 08/24/2018 08:50 AM  
 Hemoglobin A1c (POC) 7.9 07/01/2017 09:14 AM  
 
Patient does not have meter or logbook today.

## 2018-09-15 ENCOUNTER — ED HISTORICAL/CONVERTED ENCOUNTER (OUTPATIENT)
Dept: OTHER | Age: 73
End: 2018-09-15

## 2018-10-19 ENCOUNTER — TELEPHONE (OUTPATIENT)
Dept: ENDOCRINOLOGY | Age: 73
End: 2018-10-19

## 2018-10-19 NOTE — TELEPHONE ENCOUNTER
C/o diarrhea from metformin(TAKE 1 TABLET TWICE A DAYWITH MEALS), even though been on for years.  He stopped metformin a week ago and diarrhea has gone away    Please advise

## 2018-10-19 NOTE — TELEPHONE ENCOUNTER
Inform pt that metformin does that way and it depends on the  - sad     If he does not want  To be on it any more, fine with me   But sugars will go higher =     Also, no other cheaper medication to compensate for missing metformin

## 2018-12-14 ENCOUNTER — HOSPITAL ENCOUNTER (OUTPATIENT)
Dept: LAB | Age: 73
Discharge: HOME | End: 2018-12-14
Payer: MEDICARE

## 2018-12-14 PROCEDURE — 82043 UR ALBUMIN QUANTITATIVE: CPT

## 2018-12-14 PROCEDURE — 36415 COLL VENOUS BLD VENIPUNCTURE: CPT

## 2018-12-14 PROCEDURE — 80053 COMPREHEN METABOLIC PANEL: CPT

## 2018-12-14 PROCEDURE — 83036 HEMOGLOBIN GLYCOSYLATED A1C: CPT

## 2018-12-14 PROCEDURE — 80061 LIPID PANEL: CPT

## 2018-12-21 ENCOUNTER — OFFICE VISIT (OUTPATIENT)
Dept: ENDOCRINOLOGY | Age: 73
End: 2018-12-21

## 2018-12-21 VITALS
DIASTOLIC BLOOD PRESSURE: 54 MMHG | SYSTOLIC BLOOD PRESSURE: 140 MMHG | TEMPERATURE: 98.5 F | HEIGHT: 71 IN | WEIGHT: 270.2 LBS | BODY MASS INDEX: 37.83 KG/M2 | OXYGEN SATURATION: 95 % | HEART RATE: 76 BPM | RESPIRATION RATE: 18 BRPM

## 2018-12-21 DIAGNOSIS — E78.2 MIXED HYPERLIPIDEMIA: ICD-10-CM

## 2018-12-21 DIAGNOSIS — E11.65 UNCONTROLLED TYPE 2 DIABETES MELLITUS WITH HYPERGLYCEMIA (HCC): Primary | ICD-10-CM

## 2018-12-21 DIAGNOSIS — I10 ESSENTIAL HYPERTENSION: ICD-10-CM

## 2018-12-21 DIAGNOSIS — E66.01 CLASS 2 SEVERE OBESITY DUE TO EXCESS CALORIES WITH SERIOUS COMORBIDITY AND BODY MASS INDEX (BMI) OF 37.0 TO 37.9 IN ADULT (HCC): ICD-10-CM

## 2018-12-21 RX ORDER — REPAGLINIDE 2 MG/1
TABLET ORAL
Qty: 180 TAB | Refills: 4 | Status: SHIPPED | OUTPATIENT
Start: 2018-12-21 | End: 2019-04-29 | Stop reason: SDUPTHER

## 2018-12-21 RX ORDER — PEN NEEDLE, DIABETIC 31 GX3/16"
NEEDLE, DISPOSABLE MISCELLANEOUS
Qty: 100 PEN NEEDLE | Refills: 4 | Status: SHIPPED | OUTPATIENT
Start: 2018-12-21 | End: 2020-08-14 | Stop reason: SDUPTHER

## 2018-12-21 RX ORDER — METFORMIN HYDROCHLORIDE 500 MG/1
500 TABLET, EXTENDED RELEASE ORAL
Qty: 90 TAB | Refills: 4 | Status: SHIPPED | OUTPATIENT
Start: 2018-12-21 | End: 2019-04-29 | Stop reason: SDUPTHER

## 2018-12-21 RX ORDER — METOLAZONE 2.5 MG/1
2.5 TABLET ORAL
COMMUNITY
Start: 2018-10-19

## 2018-12-21 NOTE — PATIENT INSTRUCTIONS
Refills -Please call your pharmacy and have them send us a refill request.  Results - Allow up to a week for lab results to be processed and reviewed. Phone calls - Allow up to 24 hours for non-urgent calls to be returned. Prior Authorization - May take up to 2 weeks to process depending on your insurance. Forms - FMLA, DMV, Patient Assistance, etc. will take up to 2 weeks to process. Cancellations - Please notify the office in advance if you cannot keep your appointment. Samples - Will only be dispensed at visits as supplies are limited.      If you are having a medical emergency, call 911.      ----------------------------------------------------------------------------------------------------------------------    bydureon 2 mg once  A week ( optional by cost )        Start on  Metformin  mg a day , stop plain metformin 1000 mg dose     Check blood sugars immediately before each meal and at bedtime     NPH insulin 70 units at bed time ( vials)  Or   Increased  HUMULIN  80 units at night ( he likes pens and is in catastrophic coverage )      Take prandin  2 mg right before b-fast and lunch  And humulin regular   Insulin  10 units  with dinner ,  but you will not take prandin before dinner         CARB max is 30 gm per meal   Less than 70 mg NO INSULIN        Do not skip meals  Do not eat in between meals  Reduce carbs- pasta, rice, potatoes, bread   Do not drink juices or sodas  Do not eat peanut butter     Do not eat sugar free cookies and cakes   Do not eat grapes, ornages, pine apple and peaches , raisins

## 2018-12-21 NOTE — PROGRESS NOTES
HISTORY OF PRESENT ILLNESS   Fauzia Pearce is a 75  y.o. male. HPI   Patient here for follow up of Type 2 diabetes mellitus after his last visit in August 2018     He developed severe diarrhea in sept and he stopped it   But when he realized that there is no cheaper alternative to it, he resumed it  Back at 500 mg once  A day     Lost 2 lbs     He is in catastrophic coverage          [facility]     Gained 3  lbs   forgot the meter , at the visit  ( he is always forget ful )    denies lack of  compliance with diet  Not able to take bydureon because of donut hole and he just got into catastrophic coverage     He has not started on humalog and he stayed on prandin  He appears not  depressed today        Prior DM history-   H/o DM 2 for 11 yrs. Discontinued victoza sec to diarrhoea and higher co-pay. Started back on Byetta   Current diabetic medications include Metformin, glipizide and Byetta.    Current monitoring regimen: home blood tests - 2 times daily   Home blood sugar records: fasting range: 140-10mg postprandial range: 130-160 mg   Post prandial more than 300 mg   Current exercise: no regular exercise   Known diabetic complications: peripheral neuropathy and cardiovascular diseases/p CABG         Past Medical History:   Diagnosis Date    Arthritis     CAD (coronary artery disease) of artery bypass graft CABG in jan 2003    dr. Mejia Cellulitis        Social History     Socioeconomic History    Marital status:      Spouse name: Not on file    Number of children: Not on file    Years of education: Not on file    Highest education level: Not on file   Social Needs    Financial resource strain: Not on file    Food insecurity - worry: Not on file    Food insecurity - inability: Not on file   [employer] needs - medical: Not on file   [employer] needs - non-medical: Not on file   Occupational History    Not on file   Tobacco Use    Smoking status: Never Smoker    Smokeless tobacco: Never Used   Substance and Sexual Activity    Alcohol use: No    Drug use: No    Sexual activity: Not on file   Other Topics Concern    Not on file   Social History Narrative    Not on file       History reviewed. No pertinent family history. Review of Systems   Constitutional: Negative. HENT: Negative. Eyes: Negative for pain and redness. Respiratory: Negative. Cardiovascular: Negative for chest pain, palpitations and leg swelling. Gastrointestinal: Negative. Negative for constipation. Genitourinary: Negative. Musculoskeletal: Negative for myalgias. Skin: Negative. Neurological: Negative. Endo/Heme/Allergies: Negative. Psychiatric/Behavioral: Negative for depression and memory loss. The patient does not have insomnia. Physical Exam   Constitutional: He is oriented to person, place, and time. He appears well-developed and well-nourished. HENT:   Head: Normocephalic. Eyes: Conjunctivae and extraocular motions are normal. Pupils are equal, round, and reactive to light. Neck: Normal range of motion. Neck supple. No JVD present. No tracheal deviation present. No thyromegaly present. Cardiovascular: Normal rate, regular rhythm and normal heart sounds. Pulmonary/Chest: Effort normal and breath sounds normal.   Abdominal: Soft. Bowel sounds are normal.   Musculoskeletal: Normal range of motion. Lymphadenopathy:   He has no cervical adenopathy. Neurological: He is alert and oriented to person, place, and time. He has normal reflexes. Skin: Skin is warm. Psychiatric: He has a normal mood and affect.      Diabetic foot exam:  May 2018     Left: Reflexes nd     Vibratory sensation diminished    Proprioception nd   Sharp/dull discrimination nd    Filament test reduced sensation with micro filament   Pulse DP: 2+ (normal)   Pulse PT: nd   Deformities: Yes - onychomycosis     Right: Reflexes nd   Vibratory sensation diminished   Proprioception nd   Sharp/dull discrimination nd   Filament test reduced sensation with micro filament   Pulse DP: 2+ (normal)   Pulse PT: nd   Deformities: Yes - left toe onychomycosis, second toe lateral deviated         Lab Results   Component Value Date/Time    Hemoglobin A1c 8.6 (H) 12/14/2018 03:07 PM    Hemoglobin A1c 7.7 (H) 08/24/2018 08:50 AM    Hemoglobin A1c 8.3 (H) 05/02/2018 08:51 AM    Microalb/Creat ratio (ug/mg creat.) <4.2 12/14/2018 03:07 PM    LDL, calculated 77 12/14/2018 03:07 PM    Creatinine 0.97 12/14/2018 03:07 PM      Lab Results   Component Value Date/Time    Cholesterol, total 144 12/14/2018 03:07 PM    HDL Cholesterol 43 12/14/2018 03:07 PM    LDL, calculated 77 12/14/2018 03:07 PM    Triglyceride 119 12/14/2018 03:07 PM     Lab Results   Component Value Date/Time    AST (SGOT) 14 12/14/2018 03:07 PM    Alk. phosphatase 77 12/14/2018 03:07 PM     Lab Results   Component Value Date/Time    GFR est AA 89 12/14/2018 03:07 PM    GFR est non-AA 77 12/14/2018 03:07 PM    Creatinine 0.97 12/14/2018 03:07 PM    BUN 23 12/14/2018 03:07 PM    Sodium 136 12/14/2018 03:07 PM    Potassium 4.8 12/14/2018 03:07 PM    Chloride 99 12/14/2018 03:07 PM    CO2 20 12/14/2018 03:07 PM              ASSESSMENT and PLAN       1. Type 2 diabetes mellitus, uncontrolled.  A1C is   8.6 %     From dec 2018      compared to   7.7 %      From     August 2018    Compared to   8.3 %     From may 2018    Compared to   8 %     From oct 29 2017    compared to  7.9 %     From     July 1 2017   8.3   %    From  March 2016   Compared to  8.2 %     From  Dec 2016   comapred to 8.8 %    From sept 2016  compared to  8.1 %     From march 2015 compared to    8 %    From dec 2014  Compared to    7 %  From May 2014 compared to  7.4 % from feb 2014 compared to 7.3 % from nov 2013 compared to  6.8 % from June 2013 compared to  8 % march 2013 ; compared to  7.1 % July 2012 compared to 7 % compared to 6.9 % from oct 2011     Lost glycemic control  , as he could nto take metformin   He is in catastrophic coverage     The log high sugars   He is not taking humalog with dinner  Regularly     Interestingly, he is willing to take more shots, but in PEN form today   Wrote for humulin nph as it comes in form of pens, wrote for bydureon for 3 months supply while in catastrophic coverage      The patient is told to continue on Glucophage, but changed to metformin er   On and off on bydureon by cost and coverage   Could not tolerate victoza as he had GI distress     Has not been taking humalog  10 units with dinner     But encouraging to take humalog with b-fast and lunch And no prandin   Please check 2 hours dinner           2. Hypertension. Blood pressure is well controlled on lisinopril, HCTZ and carvedilol. 3. Dyslipidemia. Zocor 40 mg is being continued. 4. PAD : normal BRIDGER     5. CAD/CABG : 2003-  afib- s/p cardioablation- on pradexa  And Tikosyn   F/u with dr. Shmaa Sharif and Dr Jose Manuel Pulliam     6   Obesity : Body mass index is 37.69 kg/m².   TLC advised   bydureon optional by cost       > 50 % of time is spent on counseling   Patient voiced understanding her plan of care

## 2018-12-21 NOTE — PROGRESS NOTES
1. Have you been to the ER, urgent care clinic since your last visit? Yes/September 2018/Diarrhea/[facility]  Hospitalized since your last visit? No    2. Have you seen or consulted any other health care providers outside of the [address] [place] [address] since your last visit? Include any pap smears or colon screening. No     Wt Readings from Last 3 Encounters:   12/21/18 270 lb 3.2 oz (122.6 kg)   08/31/18 272 lb 12.8 oz (123.7 kg)   05/09/18 269 lb 6.4 oz (122.2 kg)     Temp Readings from Last 3 Encounters:   12/21/18 98.5 °F (36.9 °C) (Oral)   08/31/18 97 °F (36.1 °C) (Oral)   05/09/18 97.4 °F (36.3 °C) (Oral)     BP Readings from Last 3 Encounters:   12/21/18 140/54   08/31/18 136/56   05/09/18 132/56     Pulse Readings from Last 3 Encounters:   12/21/18 76   08/31/18 67   05/09/18 64     Lab Results   Component Value Date/Time    Hemoglobin A1c 8.6 (H) 12/14/2018 03:07 PM    Hemoglobin A1c (POC) 7.9 07/01/2017 09:14 AM     Patient has meter today.   Patient will schedule eye exam I January 2019

## 2019-04-05 ENCOUNTER — OP HISTORICAL/CONVERTED ENCOUNTER (OUTPATIENT)
Dept: OTHER | Age: 74
End: 2019-04-05

## 2019-04-22 ENCOUNTER — HOSPITAL ENCOUNTER (OUTPATIENT)
Dept: LAB | Age: 74
Discharge: HOME | End: 2019-04-22
Payer: MEDICARE

## 2019-04-22 DIAGNOSIS — E11.65 UNCONTROLLED TYPE 2 DIABETES MELLITUS WITH HYPERGLYCEMIA (HCC): ICD-10-CM

## 2019-04-22 PROCEDURE — 82043 UR ALBUMIN QUANTITATIVE: CPT

## 2019-04-22 PROCEDURE — 80053 COMPREHEN METABOLIC PANEL: CPT

## 2019-04-22 PROCEDURE — 80061 LIPID PANEL: CPT

## 2019-04-22 PROCEDURE — 83036 HEMOGLOBIN GLYCOSYLATED A1C: CPT

## 2019-04-22 PROCEDURE — 36415 COLL VENOUS BLD VENIPUNCTURE: CPT

## 2019-04-23 LAB
ALBUMIN SERPL-MCNC: 4.4 G/DL (ref 3.5–4.8)
ALBUMIN/CREAT UR: 8.8 MG/G CREAT (ref 0–30)
ALBUMIN/GLOB SERPL: 2.2 {RATIO} (ref 1.2–2.2)
ALP SERPL-CCNC: 70 IU/L (ref 39–117)
ALT SERPL-CCNC: 11 IU/L (ref 0–44)
AST SERPL-CCNC: 16 IU/L (ref 0–40)
BILIRUB SERPL-MCNC: 0.5 MG/DL (ref 0–1.2)
BUN SERPL-MCNC: 16 MG/DL (ref 8–27)
BUN/CREAT SERPL: 14 (ref 10–24)
CALCIUM SERPL-MCNC: 9.1 MG/DL (ref 8.6–10.2)
CHLORIDE SERPL-SCNC: 101 MMOL/L (ref 96–106)
CHOLEST SERPL-MCNC: 143 MG/DL (ref 100–199)
CO2 SERPL-SCNC: 24 MMOL/L (ref 20–29)
CREAT SERPL-MCNC: 1.11 MG/DL (ref 0.76–1.27)
CREAT UR-MCNC: 117 MG/DL
EST. AVERAGE GLUCOSE BLD GHB EST-MCNC: 177 MG/DL
GLOBULIN SER CALC-MCNC: 2 G/DL (ref 1.5–4.5)
GLUCOSE SERPL-MCNC: 162 MG/DL (ref 65–99)
HBA1C MFR BLD: 7.8 % (ref 4.8–5.6)
HDLC SERPL-MCNC: 48 MG/DL
INTERPRETATION, 910389: NORMAL
LDLC SERPL CALC-MCNC: 77 MG/DL (ref 0–99)
Lab: NORMAL
MICROALBUMIN UR-MCNC: 10.3 UG/ML
POTASSIUM SERPL-SCNC: 5.1 MMOL/L (ref 3.5–5.2)
PROT SERPL-MCNC: 6.4 G/DL (ref 6–8.5)
SODIUM SERPL-SCNC: 140 MMOL/L (ref 134–144)
TRIGL SERPL-MCNC: 89 MG/DL (ref 0–149)
VLDLC SERPL CALC-MCNC: 18 MG/DL (ref 5–40)

## 2019-04-29 ENCOUNTER — OFFICE VISIT (OUTPATIENT)
Dept: ENDOCRINOLOGY | Age: 74
End: 2019-04-29

## 2019-04-29 VITALS
HEIGHT: 71 IN | WEIGHT: 263.6 LBS | TEMPERATURE: 97.8 F | SYSTOLIC BLOOD PRESSURE: 144 MMHG | HEART RATE: 74 BPM | DIASTOLIC BLOOD PRESSURE: 52 MMHG | RESPIRATION RATE: 18 BRPM | OXYGEN SATURATION: 96 % | BODY MASS INDEX: 36.9 KG/M2

## 2019-04-29 DIAGNOSIS — E11.65 UNCONTROLLED TYPE 2 DIABETES MELLITUS WITH HYPERGLYCEMIA (HCC): Primary | ICD-10-CM

## 2019-04-29 DIAGNOSIS — E66.01 CLASS 2 SEVERE OBESITY DUE TO EXCESS CALORIES WITH SERIOUS COMORBIDITY AND BODY MASS INDEX (BMI) OF 37.0 TO 37.9 IN ADULT (HCC): ICD-10-CM

## 2019-04-29 DIAGNOSIS — I10 ESSENTIAL HYPERTENSION: ICD-10-CM

## 2019-04-29 DIAGNOSIS — E78.2 MIXED HYPERLIPIDEMIA: ICD-10-CM

## 2019-04-29 RX ORDER — METFORMIN HYDROCHLORIDE 500 MG/1
TABLET, EXTENDED RELEASE ORAL
Qty: 180 TAB | Refills: 4 | Status: SHIPPED | OUTPATIENT
Start: 2019-04-29 | End: 2019-08-26 | Stop reason: SDUPTHER

## 2019-04-29 RX ORDER — REPAGLINIDE 2 MG/1
TABLET ORAL
Qty: 180 TAB | Refills: 4 | Status: SHIPPED | OUTPATIENT
Start: 2019-04-29 | End: 2019-08-26 | Stop reason: SDUPTHER

## 2019-04-29 NOTE — PROGRESS NOTES
1. Have you been to the ER, urgent care clinic since your last visit? Hospitalized since your last visit? No 
 
2. Have you seen or consulted any other health care providers outside of the [address] [place] [address] since your last visit? Include any pap smears or colon screening. No  
 
Chief Complaint Patient presents with  Diabetes  
  follow up Learning assessment complete Abuse Screening Questionnaire 4/29/2019 Do you ever feel afraid of your partner? Luis Ser Are you in a relationship with someone who physically or mentally threatens you? Luis Ser Is it safe for you to go home? Lelia Murguia Wt Readings from Last 3 Encounters:  
04/29/19 263 lb 9.6 oz (119.6 kg) 12/21/18 270 lb 3.2 oz (122.6 kg) 08/31/18 272 lb 12.8 oz (123.7 kg) Temp Readings from Last 3 Encounters:  
04/29/19 97.8 °F (36.6 °C) (Oral) 12/21/18 98.5 °F (36.9 °C) (Oral) 08/31/18 97 °F (36.1 °C) (Oral) BP Readings from Last 3 Encounters:  
04/29/19 144/52  
12/21/18 140/54  
08/31/18 136/56 Pulse Readings from Last 3 Encounters:  
04/29/19 74  
12/21/18 76  
08/31/18 67 Lab Results Component Value Date/Time  Hemoglobin A1c 7.8 (H) 04/22/2019 09:58 AM  
 Hemoglobin A1c (POC) 7.9 07/01/2017 09:14 AM

## 2019-04-29 NOTE — PATIENT INSTRUCTIONS
Refills -Please call your pharmacy and have them send us a refill request. 
Results - Allow up to a week for lab results to be processed and reviewed. Phone calls - Allow up to 24 hours for non-urgent calls to be returned. Prior Authorization - May take up to 2 weeks to process depending on your insurance. Forms - FMLA, DMV, Patient Assistance, etc. will take up to 2 weeks to process. Cancellations - Please notify the office in advance if you cannot keep your appointment. Samples - Will only be dispensed at visits as supplies are limited. If you are having a medical emergency, call 911. 
 
 
---------------------------------------------------------------------------------------------------------------------- 
 
bydureon 2 mg once  A week Increase  Metformin  mg   Twice a  Day with food Check blood sugars immediately before each meal and at bedtime NPH    novolin    reli-on   insulin 70 units at bed time ( vials) Take repaglinide   2 mg right before b-fast ,  lunch  And dinner CARB max is 30 gm per meal  
 
 
 
Less than 70 mg NO INSULIN 
 
 
 
Do not skip meals Do not eat in between meals Reduce carbs- pasta, rice, potatoes, bread Do not drink juices or sodas Do not eat peanut butter Do not eat sugar free cookies and cakes Do not eat grapes, ornages, pine apple and peaches , raisins

## 2019-04-29 NOTE — LETTER
4/29/19 Patient: Justen Smith YOB: 1945 Date of Visit: 4/29/2019 Sophia Jones MD 
[address] [address] VIA Facsimile: [phone] Dear Sophia Jones MD, Thank you for referring Mr. Antonio Calderón to [address] [place] [address] for evaluation. My notes for this consultation are attached. If you have questions, please do not hesitate to call me. I look forward to following your patient along with you. Sincerely, Zayra Chavez MD

## 2019-04-29 NOTE — PROGRESS NOTES
HISTORY OF PRESENT ILLNESS Justen Smith is a 75  y.o. male. HPI Patient here for follow up of Type 2 diabetes mellitus after his last visit in December 2018 Pt has diarrhea  And had seen Dr. Guadlupe Phoenix He says that himself or Dr. Guadlupe Phoenix is thinking in line of bacterial over growth Old history :  
 
He developed severe diarrhea in sept and he stopped it But when he realized that there is no cheaper alternative to it, he resumed it  Back at 500 mg once  A day Lost 2 lbs He is in catastrophic coverage Old hsitory Gained 3  lbs  
forgot the meter , at the visit  ( he is always forget ful ) 
 
denies lack of  compliance with diet Not able to take bydureon because of donut hole and he just got into catastrophic coverage He has not started on humalog and he stayed on prandin He appears not  depressed today Prior DM history-  
H/o DM 2 for 11 yrs. Discontinued victoza sec to diarrhoea and higher co-pay. Started back on Byetta Current diabetic medications include Metformin, glipizide and Byetta. Current monitoring regimen: home blood tests - 2 times daily Home blood sugar records: fasting range: 140-10mg postprandial range: 130-160 mg  
Post prandial more than 300 mg  
Current exercise: no regular exercise Known diabetic complications: peripheral neuropathy and cardiovascular diseases/p CABG Past Medical History:  
Diagnosis Date  Arthritis  CAD (coronary artery disease) of artery bypass graft CABG in jan 2003  
 dr. Jessica Snider  Cellulitis Social History Socioeconomic History  Marital status:  Spouse name: Not on file  Number of children: Not on file  Years of education: Not on file  Highest education level: Not on file Occupational History  Not on file Social Needs  Financial resource strain: Not on file  Food insecurity:  
  Worry: Not on file Inability: Not on file  Transportation needs:  
  Medical: Not on file Non-medical: Not on file Tobacco Use  Smoking status: Never Smoker  Smokeless tobacco: Never Used Substance and Sexual Activity  Alcohol use: No  
 Drug use: No  
 Sexual activity: Not on file Lifestyle  Physical activity:  
  Days per week: Not on file Minutes per session: Not on file  Stress: Not on file Relationships  Social connections:  
  Talks on phone: Not on file Gets together: Not on file Attends [employer] service: Not on file Active member of club or organization: Not on file Attends meetings of clubs or organizations: Not on file Relationship status: Not on file  Intimate partner violence:  
  Fear of current or ex partner: Not on file Emotionally abused: Not on file Physically abused: Not on file Forced sexual activity: Not on file Other Topics Concern  Not on file Social History Narrative  Not on file History reviewed. No pertinent family history. Review of Systems Constitutional: Negative. HENT: Negative. Eyes: Negative for pain and redness. Respiratory: Negative. Cardiovascular: Negative for chest pain, palpitations and leg swelling. Gastrointestinal: Negative. Negative for constipation. Genitourinary: Negative. Musculoskeletal: Negative for myalgias. Skin: Negative. Neurological: Negative. Endo/Heme/Allergies: Negative. Psychiatric/Behavioral: Negative for depression and memory loss. The patient does not have insomnia. Physical Exam  
Constitutional: He is oriented to person, place, and time. He appears well-developed and well-nourished. HENT:  
Head: Normocephalic. Eyes: Conjunctivae and extraocular motions are normal. Pupils are equal, round, and reactive to light. Neck: Normal range of motion. Neck supple. No JVD present. No tracheal deviation present. No thyromegaly present. Cardiovascular: Normal rate, regular rhythm and normal heart sounds. Pulmonary/Chest: Effort normal and breath sounds normal.  
Abdominal: Soft. Bowel sounds are normal.  
Musculoskeletal: Normal range of motion. Lymphadenopathy:  
He has no cervical adenopathy. Neurological: He is alert and oriented to person, place, and time. He has normal reflexes. Skin: Skin is warm. Psychiatric: He has a normal mood and affect. Diabetic foot exam:  May 2018 Left: Reflexes nd Vibratory sensation diminished Proprioception nd Sharp/dull discrimination nd Filament test reduced sensation with micro filament Pulse DP: 2+ (normal) Pulse PT: nd 
 Deformities: Yes - onychomycosis Right: Reflexes nd Vibratory sensation diminished Proprioception nd Sharp/dull discrimination nd Filament test reduced sensation with micro filament Pulse DP: 2+ (normal) Pulse PT: nd 
 Deformities: Yes - left toe onychomycosis, second toe lateral deviated Lab Results Component Value Date/Time Hemoglobin A1c 7.8 (H) 04/22/2019 09:58 AM  
 Hemoglobin A1c 8.6 (H) 12/14/2018 03:07 PM  
 Hemoglobin A1c 7.7 (H) 08/24/2018 08:50 AM  
 Microalb/Creat ratio (ug/mg creat.) 8.8 04/22/2019 09:58 AM  
 LDL, calculated 77 04/22/2019 09:58 AM  
 Creatinine 1.11 04/22/2019 09:58 AM  
  
Lab Results Component Value Date/Time Cholesterol, total 143 04/22/2019 09:58 AM  
 HDL Cholesterol 48 04/22/2019 09:58 AM  
 LDL, calculated 77 04/22/2019 09:58 AM  
 Triglyceride 89 04/22/2019 09:58 AM  
 
Lab Results Component Value Date/Time AST (SGOT) 16 04/22/2019 09:58 AM  
 Alk. phosphatase 70 04/22/2019 09:58 AM  
 
Lab Results Component Value Date/Time  GFR est AA 75 04/22/2019 09:58 AM  
 GFR est non-AA 65 04/22/2019 09:58 AM  
 Creatinine 1.11 04/22/2019 09:58 AM  
 BUN 16 04/22/2019 09:58 AM  
 Sodium 140 04/22/2019 09:58 AM  
 Potassium 5.1 04/22/2019 09:58 AM  
 Chloride 101 04/22/2019 09:58 AM  
 CO2 24 04/22/2019 09:58 AM  
  
 
 
 
 
ASSESSMENT and PLAN 1. Type 2 diabetes mellitus, uncontrolled. A1C is   7.8 %      From     April 2019     Compared to  8.6 %     From dec 2018      compared to   7.7 %      From     August 2018    Compared to   8.3 %     From may 2018    Compared to   8 %     From oct 29 2017    compared to  7.9 %     From     July 1 2017   8.3   %    From  March 2016   Compared to  8.2 %     From  Dec 2016   comapred to 8.8 %    From sept 2016  compared to  8.1 %     From march 2015 compared to    8 %    From dec 2014  Compared to    7 %  From May 2014 compared to  7.4 % from feb 2014 Improved  glycemic control  , 
 asking for higher doses of metformin He is not taking humalog with dinner  Regularly Resumed prandin with dinner too On bydureon Could not tolerate victoza as he had GI distress Has not been taking humalog  10 units with dinner 2. Hypertension. Blood pressure is well controlled on lisinopril, HCTZ and carvedilol. 3. Dyslipidemia. Zocor 40 mg is being continued. 4. PAD : normal BRIDGER 5. CAD/CABG : 2003-  afib- s/p cardioablation- on pradexa  And Tikosyn F/u with dr. Bob Johnson and Dr Jose Manuel Velez 6   Obesity : Body mass index is 36.76 kg/m². TLC advised  
bydureon optional by cost  
 
 
> 50 % of time is spent on counseling Patient voiced understanding her plan of care

## 2019-08-23 ENCOUNTER — HOSPITAL ENCOUNTER (OUTPATIENT)
Dept: LAB | Age: 74
Discharge: HOME | End: 2019-08-23
Payer: MEDICARE

## 2019-08-23 PROCEDURE — 83036 HEMOGLOBIN GLYCOSYLATED A1C: CPT

## 2019-08-23 PROCEDURE — 36415 COLL VENOUS BLD VENIPUNCTURE: CPT

## 2019-08-23 PROCEDURE — 80053 COMPREHEN METABOLIC PANEL: CPT

## 2019-08-23 PROCEDURE — 82043 UR ALBUMIN QUANTITATIVE: CPT

## 2019-08-23 PROCEDURE — 80061 LIPID PANEL: CPT

## 2019-08-26 ENCOUNTER — OFFICE VISIT (OUTPATIENT)
Dept: ENDOCRINOLOGY | Age: 74
End: 2019-08-26

## 2019-08-26 VITALS
DIASTOLIC BLOOD PRESSURE: 46 MMHG | OXYGEN SATURATION: 96 % | HEIGHT: 71 IN | TEMPERATURE: 98.4 F | SYSTOLIC BLOOD PRESSURE: 133 MMHG | RESPIRATION RATE: 20 BRPM | BODY MASS INDEX: 37.03 KG/M2 | HEART RATE: 66 BPM | WEIGHT: 264.5 LBS

## 2019-08-26 DIAGNOSIS — E11.65 UNCONTROLLED TYPE 2 DIABETES MELLITUS WITH HYPERGLYCEMIA (HCC): Primary | ICD-10-CM

## 2019-08-26 DIAGNOSIS — E78.2 MIXED HYPERLIPIDEMIA: ICD-10-CM

## 2019-08-26 DIAGNOSIS — E66.01 CLASS 2 SEVERE OBESITY DUE TO EXCESS CALORIES WITH SERIOUS COMORBIDITY AND BODY MASS INDEX (BMI) OF 37.0 TO 37.9 IN ADULT (HCC): ICD-10-CM

## 2019-08-26 DIAGNOSIS — I10 ESSENTIAL HYPERTENSION: ICD-10-CM

## 2019-08-26 RX ORDER — REPAGLINIDE 2 MG/1
TABLET ORAL
Qty: 270 TAB | Refills: 4 | Status: SHIPPED | OUTPATIENT
Start: 2019-08-26 | End: 2020-09-04

## 2019-08-26 RX ORDER — METFORMIN HYDROCHLORIDE 500 MG/1
TABLET, EXTENDED RELEASE ORAL
Qty: 360 TAB | Refills: 4 | Status: SHIPPED | OUTPATIENT
Start: 2019-08-26 | End: 2020-09-11

## 2019-08-26 NOTE — PATIENT INSTRUCTIONS
Refills -Please call your pharmacy and have them send us a refill request.  Results - Allow up to a week for lab results to be processed and reviewed. Phone calls - Allow up to 24 hours for non-urgent calls to be returned. Prior Authorization - May take up to 2 weeks to process depending on your insurance. Forms - FMLA, DMV, Patient Assistance, etc. will take up to 2 weeks to process. Cancellations - Please notify the office in advance if you cannot keep your appointment. Samples - Will only be dispensed at visits as supplies are limited.      If you are having a medical emergency, call 911.      ----------------------------------------------------------------------------------------------------------------    bydureon 2 mg once  A week ( optional based on donut hole )      Increase  Metformin  mg  2 pills    Twice a  Day with food     Check blood sugars immediately before each meal and at bedtime       NPH    novolin    reli-on   insulin 70 units at bed time ( vials)    Take repaglinide   2 mg right before b-fast ,  lunch  And dinner     CARB max is 30 gm per meal       Less than 70 mg NO INSULIN        Do not skip meals  Do not eat in between meals  Reduce carbs- pasta, rice, potatoes, bread   Do not drink juices or sodas  Do not eat peanut butter     Do not eat sugar free cookies and cakes   Do not eat grapes, ornages, pine apple and peaches , raisins

## 2019-08-26 NOTE — LETTER
8/26/19 Patient: Sandro Dumont YOB: 1945 Date of Visit: 8/26/2019 Karson Hussein MD 
[address] [facility] [address] VIA Facsimile: [phone] Dear Karson Hussein MD, Thank you for referring Mr. Price Peterson to [id number] [place] [address] for evaluation. My notes for this consultation are attached. If you have questions, please do not hesitate to call me. I look forward to following your patient along with you. Sincerely, Ana Luisa Castro MD

## 2019-08-26 NOTE — PROGRESS NOTES
1. Have you been to the ER, urgent care clinic since your last visit? No Hospitalized since your last visit? No    2. Have you seen or consulted any other health care providers outside of the [address] [place] [address] since your last visit? Include any pap smears or colon screening. No     Wt Readings from Last 3 Encounters:   08/26/19 264 lb 8 oz (120 kg)   04/29/19 263 lb 9.6 oz (119.6 kg)   12/21/18 270 lb 3.2 oz (122.6 kg)     Temp Readings from Last 3 Encounters:   08/26/19 98.4 °F (36.9 °C) (Oral)   04/29/19 97.8 °F (36.6 °C) (Oral)   12/21/18 98.5 °F (36.9 °C) (Oral)     BP Readings from Last 3 Encounters:   08/26/19 133/46   04/29/19 144/52   12/21/18 140/54     Pulse Readings from Last 3 Encounters:   08/26/19 66   04/29/19 74   12/21/18 76     Lab Results   Component Value Date/Time    Hemoglobin A1c 8.4 (H) 08/23/2019 10:59 AM    Hemoglobin A1c (POC) 7.9 07/01/2017 09:14 AM     Patient has meter today.   Patient will schedule eye exam.

## 2019-08-26 NOTE — PROGRESS NOTES
HISTORY OF PRESENT ILLNESS   Warden Lefort is a 75  y.o. male. HPI   Patient here for follow up of Type 2 diabetes mellitus after his last visit in April 2019      Pt is diagnosed with IBS in the interim  He reduced the dose of metformin er for diarrhea   But would like to stronger dose     Old history      Pt has diarrhea  And had seen Dr. Kylie Simms   He says that himself or Dr. Kylie Simms is thinking in line of bacterial over growth           Old history :     He developed severe diarrhea in sept and he stopped it   But when he realized that there is no cheaper alternative to it, he resumed it  Back at 500 mg once  A day     Lost 2 lbs     He is in catastrophic coverage          Old hsitory     Gained 3  lbs   forgot the meter , at the visit  ( he is always forget ful )    denies lack of  compliance with diet  Not able to take bydureon because of donut hole and he just got into catastrophic coverage     He has not started on humalog and he stayed on prandin  He appears not  depressed today        Prior DM history-   H/o DM 2 for 11 yrs. Discontinued victoza sec to diarrhoea and higher co-pay. Started back on Byetta   Current diabetic medications include Metformin, glipizide and Byetta.    Current monitoring regimen: home blood tests - 2 times daily   Home blood sugar records: fasting range: 140-10mg postprandial range: 130-160 mg   Post prandial more than 300 mg   Current exercise: no regular exercise   Known diabetic complications: peripheral neuropathy and cardiovascular diseases/p CABG         Past Medical History:   Diagnosis Date    Arthritis     CAD (coronary artery disease) of artery bypass graft CABG in jan 2003    dr. Brenda Gaspar Cellulitis        Social History     Socioeconomic History    Marital status:      Spouse name: Not on file    Number of children: Not on file    Years of education: Not on file    Highest education level: Not on file   Occupational History    Not on file   Social Needs    Financial resource strain: Not on file    Food insecurity:     Worry: Not on file     Inability: Not on file    Transportation needs:     Medical: Not on file     Non-medical: Not on file   Tobacco Use    Smoking status: Never Smoker    Smokeless tobacco: Never Used   Substance and Sexual Activity    Alcohol use: No    Drug use: No    Sexual activity: Not on file   Lifestyle    Physical activity:     Days per week: Not on file     Minutes per session: Not on file    Stress: Not on file   Relationships    Social connections:     Talks on phone: Not on file     Gets together: Not on file     Attends [employer] service: Not on file     Active member of club or organization: Not on file     Attends meetings of clubs or organizations: Not on file     Relationship status: Not on file    Intimate partner violence:     Fear of current or ex partner: Not on file     Emotionally abused: Not on file     Physically abused: Not on file     Forced sexual activity: Not on file   Other Topics Concern    Not on file   Social History Narrative    Not on file       History reviewed. No pertinent family history. Review of Systems   Constitutional: Negative. HENT: Negative. Eyes: Negative for pain and redness. Respiratory: Negative. Cardiovascular: Negative for chest pain, palpitations and leg swelling. Gastrointestinal: Negative. Negative for constipation. Genitourinary: Negative. Musculoskeletal: Negative for myalgias. Skin: Negative. Neurological: Negative. Endo/Heme/Allergies: Negative. Psychiatric/Behavioral: Negative for depression and memory loss. The patient does not have insomnia. Physical Exam   Constitutional: He is oriented to person, place, and time. He appears well-developed and well-nourished. HENT:   Head: Normocephalic. Eyes: Conjunctivae and extraocular motions are normal. Pupils are equal, round, and reactive to light. Neck: Normal range of motion.  Neck supple. No JVD present. No tracheal deviation present. No thyromegaly present. Cardiovascular: Normal rate, regular rhythm and normal heart sounds. Pulmonary/Chest: Effort normal and breath sounds normal.   Abdominal: Soft. Bowel sounds are normal.   Musculoskeletal: Normal range of motion. Lymphadenopathy:   He has no cervical adenopathy. Neurological: He is alert and oriented to person, place, and time. He has normal reflexes. Skin: Skin is warm. Psychiatric: He has a normal mood and affect. Diabetic foot exam:  May 2018     Left: Reflexes nd     Vibratory sensation diminished    Proprioception nd   Sharp/dull discrimination nd    Filament test reduced sensation with micro filament   Pulse DP: 2+ (normal)   Pulse PT: nd   Deformities: Yes - onychomycosis     Right: Reflexes nd   Vibratory sensation diminished   Proprioception nd   Sharp/dull discrimination nd   Filament test reduced sensation with micro filament   Pulse DP: 2+ (normal)   Pulse PT: nd   Deformities: Yes - left toe onychomycosis, second toe lateral deviated         Lab Results   Component Value Date/Time    Hemoglobin A1c 8.4 (H) 08/23/2019 10:59 AM    Hemoglobin A1c 7.8 (H) 04/22/2019 09:58 AM    Hemoglobin A1c 8.6 (H) 12/14/2018 03:07 PM    Microalb/Creat ratio (ug/mg creat.) 9.3 08/23/2019 10:59 AM    LDL, calculated 95 08/23/2019 10:59 AM    Creatinine 1.08 08/23/2019 10:59 AM      Lab Results   Component Value Date/Time    Cholesterol, total 164 08/23/2019 10:59 AM    HDL Cholesterol 42 08/23/2019 10:59 AM    LDL, calculated 95 08/23/2019 10:59 AM    Triglyceride 134 08/23/2019 10:59 AM     Lab Results   Component Value Date/Time    AST (SGOT) 12 08/23/2019 10:59 AM    Alk.  phosphatase 68 08/23/2019 10:59 AM     Lab Results   Component Value Date/Time    GFR est AA 77 08/23/2019 10:59 AM    GFR est non-AA 67 08/23/2019 10:59 AM    Creatinine 1.08 08/23/2019 10:59 AM    BUN 23 08/23/2019 10:59 AM    Sodium 138 08/23/2019 10:59 AM Potassium 4.9 08/23/2019 10:59 AM    Chloride 101 08/23/2019 10:59 AM    CO2 20 08/23/2019 10:59 AM              ASSESSMENT and PLAN       1. Type 2 diabetes mellitus, uncontrolled. A1C is    8.4 %  From   August 2019     compared to     7.8 %      From     April 2019     Compared to  8.6 %     From dec 2018      compared to   7.7 %      From     August 2018    Compared to   8.3 %     From may 2018    Compared to   8 %     From oct 29 2017    compared to  7.9 %     From     July 1 2017   8.3   %    From  March 2016   Compared to  8.2 %     From  Dec 2016   comapred to 8.8 %    From sept 2016  compared to  8.1 %     From march 2015         Losing   glycemic control  ,  Fasting sugars ranging from 130 to 190 mg   asking for higher doses of metformin , will do     He does  not   Like   taking humalog with dinner  And  I resumed prandin with dinner too   Explained to him that he has to adjust to prandin if he does not like to be on shots   On bydureon optional  Based on cost     He is explained about the help he can get from taking humalog versus being on prandin and he is willing to try it   Could not tolerate victoza as he had GI distress       2. Hypertension. Blood pressure is well controlled on lisinopril, HCTZ and carvedilol. 3. Dyslipidemia :  Lipids are great, Zocor 40 mg is being continued. 4. PAD : normal BRIDGER     5. CAD/CABG : 2003-  afib- s/p cardioablation- on pradexa  And Tikosyn   F/u with dr. Marion Fitzgerald and Dr Jose Manuel Mckeon     6   Obesity : Body mass index is 36.89 kg/m².   TLC advised   bydureon optional by cost       > 50 % of time is spent on counseling   Patient voiced understanding her plan of care

## 2020-01-23 LAB — HBA1C MFR BLD HPLC: 6.1 %

## 2020-01-24 LAB
CREATININE, EXTERNAL: 0.99
LDL-C, EXTERNAL: 71

## 2020-03-05 ENCOUNTER — TELEPHONE (OUTPATIENT)
Dept: ENDOCRINOLOGY | Age: 75
End: 2020-03-05

## 2020-03-05 NOTE — TELEPHONE ENCOUNTER
Patient needs cms form filled out for test strips per [facility] pharmacy.

## 2020-03-05 NOTE — TELEPHONE ENCOUNTER
Call placed to [employer] requesting CMN form to be faxed to office. Tamiko (pharmacist) states he will fax today.

## 2020-04-14 ENCOUNTER — TELEPHONE (OUTPATIENT)
Dept: ENDOCRINOLOGY | Age: 75
End: 2020-04-14

## 2020-04-14 DIAGNOSIS — I10 ESSENTIAL HYPERTENSION: ICD-10-CM

## 2020-04-14 DIAGNOSIS — E11.65 UNCONTROLLED TYPE 2 DIABETES MELLITUS WITH HYPERGLYCEMIA (HCC): Primary | ICD-10-CM

## 2020-04-14 DIAGNOSIS — E78.2 MIXED HYPERLIPIDEMIA: ICD-10-CM

## 2020-05-18 ENCOUNTER — OFFICE VISIT (OUTPATIENT)
Dept: ENDOCRINOLOGY | Age: 75
End: 2020-05-18

## 2020-05-18 ENCOUNTER — VIRTUAL VISIT (OUTPATIENT)
Dept: ENDOCRINOLOGY | Age: 75
End: 2020-05-18

## 2020-05-18 DIAGNOSIS — I10 ESSENTIAL HYPERTENSION: ICD-10-CM

## 2020-05-18 DIAGNOSIS — E03.9 HYPOTHYROIDISM, UNSPECIFIED TYPE: ICD-10-CM

## 2020-05-18 DIAGNOSIS — Z79.4 ENCOUNTER FOR LONG-TERM (CURRENT) USE OF INSULIN (HCC): ICD-10-CM

## 2020-05-18 DIAGNOSIS — E11.65 UNCONTROLLED TYPE 2 DIABETES MELLITUS WITH HYPERGLYCEMIA (HCC): Primary | ICD-10-CM

## 2020-05-18 DIAGNOSIS — E78.2 MIXED HYPERLIPIDEMIA: ICD-10-CM

## 2020-05-18 DIAGNOSIS — I48.91 ATRIAL FIBRILLATION, UNSPECIFIED TYPE (HCC): ICD-10-CM

## 2020-05-18 NOTE — PROGRESS NOTES
Melony Duong is a 67 y.o. male evaluated via telephone on 5/18/2020. Consent:  He and/or health care decision maker is aware that that he may receive a bill for this telephone service, depending on his insurance coverage, and has provided verbal consent to proceed: Yes      HISTORY OF PRESENT ILLNESS   Melony Duong is a 67 y.o. male. HPI   Patient here for follow up of Type 2 diabetes mellitus after his last visit in August 2019    No log with him  A good gap   Compliant with meds         Old history :      Pt is diagnosed with IBS in the interim  He reduced the dose of metformin er for diarrhea   But would like to stronger dose     Old history      Pt has diarrhea  And had seen Dr. Maryan Emmanuel   He says that himself or Dr. Maryan Emmanuel is thinking in line of bacterial over growth           Old history :     He developed severe diarrhea in sept and he stopped it   But when he realized that there is no cheaper alternative to it, he resumed it  Back at 500 mg once  A day     Lost 2 lbs     He is in catastrophic coverage          Old hsitory     Gained 3  lbs   forgot the meter , at the visit  ( he is always forget ful )  denies lack of  compliance with diet  Not able to take bydureon because of donut hole and he just got into catastrophic coverage   He has not started on humalog and he stayed on prandin  He appears not  depressed today        Prior DM history-   H/o DM 2 for 11 yrs. Discontinued victoza sec to diarrhoea and higher co-pay. Started back on Byetta   Current diabetic medications include Metformin, glipizide and Byetta.    Current monitoring regimen: home blood tests - 2 times daily   Home blood sugar records: fasting range: 140-10mg postprandial range: 130-160 mg   Post prandial more than 300 mg   Current exercise: no regular exercise   Known diabetic complications: peripheral neuropathy and cardiovascular diseases/p CABG         Past Medical History:   Diagnosis Date    Arthritis     CAD (coronary artery disease) of artery bypass graft CABG in jan 2003    dr. Jose Gould Cellulitis        Social History     Socioeconomic History    Marital status:      Spouse name: Not on file    Number of children: Not on file    Years of education: Not on file    Highest education level: Not on file   Occupational History    Not on file   Social Needs    Financial resource strain: Not on file    Food insecurity     Worry: Not on file     Inability: Not on file    Transportation needs     Medical: Not on file     Non-medical: Not on file   Tobacco Use    Smoking status: Never Smoker    Smokeless tobacco: Never Used   Substance and Sexual Activity    Alcohol use: No    Drug use: No    Sexual activity: Not on file   Lifestyle    Physical activity     Days per week: Not on file     Minutes per session: Not on file    Stress: Not on file   Relationships    Social connections     Talks on phone: Not on file     Gets together: Not on file     Attends Rastafari service: Not on file     Active member of club or organization: Not on file     Attends meetings of clubs or organizations: Not on file     Relationship status: Not on file    Intimate partner violence     Fear of current or ex partner: Not on file     Emotionally abused: Not on file     Physically abused: Not on file     Forced sexual activity: Not on file   Other Topics Concern    Not on file   Social History Narrative    Not on file       History reviewed. No pertinent family history. Review of Systems   Constitutional: Negative. HENT: Negative. Eyes: Negative for pain and redness. Respiratory: Negative. Cardiovascular: Negative for chest pain, palpitations and leg swelling. Gastrointestinal: Negative. Negative for constipation. Genitourinary: Negative. Musculoskeletal: Negative for myalgias. Skin: Negative. Neurological: Negative. Endo/Heme/Allergies: Negative.     Psychiatric/Behavioral: Negative for depression and memory loss. The patient does not have insomnia. Physical Exam   Constitutional: oriented to person, place, and time. appears well-developed and well-nourished. HENT:   Head: Normocephalic. Eyes: normal , noted no swelling or redness. Neck: Normal range of motion. Cardiovascular: could nto be examined  Pulmonary/Chest: appears breathing effortlessly  Abdominal: Soft. Musculoskeletal: appears relatively nprmal  range of motion. Neurological: He is alert and oriented to person, place, and time. Appears to have no focal deficits    Psychiatric: He has a normal mood and affect. Labs reviewed from pcp  Jan 2020           ASSESSMENT and PLAN       1. Type 2 diabetes mellitus, uncontrolled. A1C is  6.1 %  From jan 2020   Compared to    8.4 %  From   August 2019     compared to     7.8 %      From     April 2019     Compared to  8.6 %     From dec 2018      compared to   7.7 %      From     August 2018    Compared to   8.3 %     From may 2018    Compared to   8 %     From oct 29 2017    compared to  7.9 %     From     July 1 2017   8.3   %    From  March 2016   Compared to  8.2 %     From  Dec 2016   comapred to 8.8 %    From sept 2016  compared to  8.1 %     From march 2015     May 2020   Good glycemic control in jan 2020   Metformin er 1 gm bid   lantus plus prandin to be continued         August 2019  :    Losing   glycemic control  ,  Fasting sugars ranging from 130 to 190 mg   asking for higher doses of metformin , will do   He does  not   Like   taking humalog with dinner  And  I resumed prandin with dinner too   Explained to him that he has to adjust to prandin if he does not like to be on shots   On bydureon optional  Based on cost   He is explained about the help he can get from taking humalog versus being on prandin and he is willing to try it   Could not tolerate victoza as he had GI distress       2. Hypertension.  Blood pressure is well controlled on lisinopril, HCTZ and carvedilol. 3. Dyslipidemia :  Lipids are great, Zocor 40 mg is being continued. 4. PAD : normal BRIDGER     5. CAD/CABG : 2003-  afib- s/p cardioablation- on pradexa  And Tikosyn   F/u with dr. Maggie Baker and Dr Jose Manuel Jc     6   Obesity : There is no height or weight on file to calculate BMI. TLC advised   bydureon optional by cost       7. Hypothyroidism :  TSH  Was 12  From jan 2020  NEW DIAGNOSIS  - order repeat labs   -order synthroid         Phone time  :  22 min      Pursuant  To the Emergency declaration under the [employer] and the [facility], [facility] waiver authority and the [facility], to reduce the patient's risk of exposure to  COVID-19 and provide continuity of care for an established patient.         > 50 % of time is spent on counseling   Patient voiced understanding her plan of care

## 2020-05-18 NOTE — PROGRESS NOTES
1. Have you been to the ER, urgent care clinic since your last visit? No  Hospitalized since your last visit? No    2. Have you seen or consulted any other health care providers outside of the [address] [place] [address] since your last visit? Include any pap smears or colon screening.  No

## 2020-05-18 NOTE — PATIENT INSTRUCTIONS
Synthroid ? ??? Dose yet to e decided , on empty stomach with water only, no other meds or food or drinks   For next half hour Take any kind of vitamins, calcium, iron   Pills  4 hours later 
 
--------------------------------------------------------------------------------------------------------------- 
 
 
 
 
bydureon 2 mg once  A week ( optional based on donut hole ) Metformin  mg  2 pills    Twice a  Day with food Check blood sugars immediately before each meal and at bedtime NPH    novolin    reli-on   insulin 70 units at bed time ( vials) Take repaglinide   2 mg right before b-fast ,  lunch  And dinner CARB max is 30 gm per meal  
 
 
Less than 70 mg NO INSULIN 
 
 
 
Do not skip meals Do not eat in between meals Reduce carbs- pasta, rice, potatoes, bread Do not drink juices or sodas Do not eat peanut butter Do not eat sugar free cookies and cakes Do not eat grapes, ornages, pine apple and peaches , raisins

## 2020-06-01 DIAGNOSIS — I10 ESSENTIAL HYPERTENSION: ICD-10-CM

## 2020-06-01 DIAGNOSIS — E03.9 HYPOTHYROIDISM, UNSPECIFIED TYPE: ICD-10-CM

## 2020-06-01 DIAGNOSIS — E11.65 UNCONTROLLED TYPE 2 DIABETES MELLITUS WITH HYPERGLYCEMIA (HCC): ICD-10-CM

## 2020-06-01 DIAGNOSIS — E78.2 MIXED HYPERLIPIDEMIA: ICD-10-CM

## 2020-08-10 RX ORDER — CALCIUM CARB/VITAMIN D3/VIT K1 500-100-40
TABLET,CHEWABLE ORAL
Qty: 60 SYRINGE | Refills: 5 | Status: SHIPPED | OUTPATIENT
Start: 2020-08-10 | End: 2021-08-11

## 2020-08-14 ENCOUNTER — TELEPHONE (OUTPATIENT)
Dept: ENDOCRINOLOGY | Age: 75
End: 2020-08-14

## 2020-08-14 RX ORDER — PEN NEEDLE, DIABETIC 31 GX3/16"
NEEDLE, DISPOSABLE MISCELLANEOUS
Qty: 100 PEN NEEDLE | Refills: 4 | Status: SHIPPED | OUTPATIENT
Start: 2020-08-14

## 2020-08-14 NOTE — TELEPHONE ENCOUNTER
----- Message from Adria Durand sent at 8/14/2020  1:33 PM EDT -----  Regarding: Dr. Alean Riedel  Pt would like a call back regarding getting a refill on his Insulin called to [facility], number on file.  Contact is [phone]

## 2020-09-04 RX ORDER — REPAGLINIDE 2 MG/1
TABLET ORAL
Qty: 270 TAB | Refills: 4 | Status: SHIPPED | OUTPATIENT
Start: 2020-09-04 | End: 2021-09-05

## 2020-09-11 RX ORDER — METFORMIN HYDROCHLORIDE 500 MG/1
TABLET, EXTENDED RELEASE ORAL
Qty: 360 TAB | Refills: 4 | Status: SHIPPED | OUTPATIENT
Start: 2020-09-11 | End: 2021-10-11

## 2020-09-20 RX ORDER — CALCIUM CITRATE/VITAMIN D3 200MG-6.25
TABLET ORAL
Qty: 100 STRIP | Refills: 4 | Status: SHIPPED | OUTPATIENT
Start: 2020-09-20 | End: 2021-06-30

## 2020-09-21 ENCOUNTER — OFFICE VISIT (OUTPATIENT)
Dept: ENDOCRINOLOGY | Age: 75
End: 2020-09-21
Payer: MEDICARE

## 2020-09-21 VITALS
SYSTOLIC BLOOD PRESSURE: 115 MMHG | HEART RATE: 63 BPM | RESPIRATION RATE: 14 BRPM | DIASTOLIC BLOOD PRESSURE: 50 MMHG | WEIGHT: 273 LBS | BODY MASS INDEX: 38.08 KG/M2 | TEMPERATURE: 98.7 F

## 2020-09-21 DIAGNOSIS — Z79.4 ENCOUNTER FOR LONG-TERM (CURRENT) USE OF INSULIN (HCC): ICD-10-CM

## 2020-09-21 DIAGNOSIS — I10 ESSENTIAL HYPERTENSION: ICD-10-CM

## 2020-09-21 DIAGNOSIS — E11.65 UNCONTROLLED TYPE 2 DIABETES MELLITUS WITH HYPERGLYCEMIA (HCC): Primary | ICD-10-CM

## 2020-09-21 DIAGNOSIS — E78.2 MIXED HYPERLIPIDEMIA: ICD-10-CM

## 2020-09-21 DIAGNOSIS — I48.91 ATRIAL FIBRILLATION, UNSPECIFIED TYPE (HCC): ICD-10-CM

## 2020-09-21 PROCEDURE — G8754 DIAS BP LESS 90: HCPCS | Performed by: INTERNAL MEDICINE

## 2020-09-21 PROCEDURE — G8427 DOCREV CUR MEDS BY ELIG CLIN: HCPCS | Performed by: INTERNAL MEDICINE

## 2020-09-21 PROCEDURE — G8510 SCR DEP NEG, NO PLAN REQD: HCPCS | Performed by: INTERNAL MEDICINE

## 2020-09-21 PROCEDURE — G8417 CALC BMI ABV UP PARAM F/U: HCPCS | Performed by: INTERNAL MEDICINE

## 2020-09-21 PROCEDURE — 1101F PT FALLS ASSESS-DOCD LE1/YR: CPT | Performed by: INTERNAL MEDICINE

## 2020-09-21 PROCEDURE — 99214 OFFICE O/P EST MOD 30 MIN: CPT | Performed by: INTERNAL MEDICINE

## 2020-09-21 PROCEDURE — G8536 NO DOC ELDER MAL SCRN: HCPCS | Performed by: INTERNAL MEDICINE

## 2020-09-21 PROCEDURE — 3051F HG A1C>EQUAL 7.0%<8.0%: CPT | Performed by: INTERNAL MEDICINE

## 2020-09-21 PROCEDURE — G8752 SYS BP LESS 140: HCPCS | Performed by: INTERNAL MEDICINE

## 2020-09-21 RX ORDER — EXENATIDE 2 MG/.85ML
2 INJECTION, SUSPENSION, EXTENDED RELEASE SUBCUTANEOUS
Qty: 4 SYRINGE | Refills: 6 | Status: SHIPPED | OUTPATIENT
Start: 2020-09-21 | End: 2021-02-01

## 2020-09-21 NOTE — LETTER
9/21/20 Patient: Brisa Walker YOB: 1945 Date of Visit: 9/21/2020 Julio César Johnson MD 
[address] [address] VIA Facsimile: [phone] Dear Julio César Johnson MD, Thank you for referring Mr. Isidro Robbins to [address] [address] for evaluation. My notes for this consultation are attached. If you have questions, please do not hesitate to call me. I look forward to following your patient along with you. Sincerely, Sonny Jama MD

## 2020-09-21 NOTE — PATIENT INSTRUCTIONS
bydureon 2 mg once  A week ( optional based on donut hole ) Metformin  mg  2 pills    Twice a  Day with food Check blood sugars immediately before each meal and at bedtime NPH    novolin    reli-on   insulin 70 units at bed time ( vials) Take repaglinide   2 mg right before b-fast ,  lunch  And dinner CARB max is 30 gm per meal  
 
 
Less than 70 mg NO INSULIN 
 
 
 
Do not skip meals Do not eat in between meals Reduce carbs- pasta, rice, potatoes, bread Do not drink juices or sodas Do not eat peanut butter Do not eat sugar free cookies and cakes Do not eat grapes, ornages, pine apple and peaches , raisins

## 2020-09-21 NOTE — PROGRESS NOTES
HISTORY OF PRESENT ILLNESS   Abel Lennox is a 67 y.o. male. HPI   Patient here for follow up of Type 2 diabetes mellitus after his last visit in May 2020     Got  log with him  Compliant with meds       Old hsitory     Gained 3  lbs   forgot the meter , at the visit  ( he is always forget ful )  denies lack of  compliance with diet  Not able to take bydureon because of donut hole and he just got into catastrophic coverage   He has not started on humalog and he stayed on prandin  He appears not  depressed today        Prior DM history-   H/o DM 2 for 11 yrs. Discontinued victoza sec to diarrhoea and higher co-pay. Started back on Byetta   Current diabetic medications include Metformin, glipizide and Byetta.    Current monitoring regimen: home blood tests - 2 times daily   Home blood sugar records: fasting range: 140-10mg postprandial range: 130-160 mg   Post prandial more than 300 mg   Current exercise: no regular exercise   Known diabetic complications: peripheral neuropathy and cardiovascular diseases/p CABG         Past Medical History:   Diagnosis Date    Arthritis     CAD (coronary artery disease) of artery bypass graft CABG in jan 2003    dr. Laisha Ramirez Cellulitis        Social History     Socioeconomic History    Marital status:      Spouse name: Not on file    Number of children: Not on file    Years of education: Not on file    Highest education level: Not on file   Occupational History    Not on file   Social Needs    Financial resource strain: Not on file    Food insecurity     Worry: Not on file     Inability: Not on file    Transportation needs     Medical: Not on file     Non-medical: Not on file   Tobacco Use    Smoking status: Never Smoker    Smokeless tobacco: Never Used   Substance and Sexual Activity    Alcohol use: No    Drug use: No    Sexual activity: Not on file   Lifestyle    Physical activity     Days per week: Not on file     Minutes per session: Not on file    Stress: Not on file   Relationships    Social connections     Talks on phone: Not on file     Gets together: Not on file     Attends Protestant service: Not on file     Active member of club or organization: Not on file     Attends meetings of clubs or organizations: Not on file     Relationship status: Not on file    Intimate partner violence     Fear of current or ex partner: Not on file     Emotionally abused: Not on file     Physically abused: Not on file     Forced sexual activity: Not on file   Other Topics Concern    Not on file   Social History Narrative    Not on file       No family history on file. Review of Systems   Constitutional: Negative. HENT: Negative. Eyes: Negative for pain and redness. Respiratory: Negative. Cardiovascular: Negative for chest pain, palpitations and leg swelling. Gastrointestinal: Negative. Negative for constipation. Genitourinary: Negative. Musculoskeletal: Negative for myalgias. Skin: Negative. Neurological: Negative. Endo/Heme/Allergies: Negative. Psychiatric/Behavioral: Negative for depression and memory loss. The patient does not have insomnia. Physical Exam   Constitutional: oriented to person, place, and time. appears well-developed and well-nourished. HENT:   Head: Normocephalic. Eyes: normal , noted no swelling or redness. Neck: Normal range of motion. Cardiovascular: could nto be examined  Pulmonary/Chest: appears breathing effortlessly  Abdominal: Soft. Musculoskeletal: appears relatively nprmal  range of motion. Neurological: He is alert and oriented to person, place, and time. Appears to have no focal deficits    Psychiatric: He has a normal mood and affect.        Lab Results   Component Value Date/Time    Hemoglobin A1c 7.9 (H) 09/14/2020 01:55 PM    Hemoglobin A1c 8.4 (H) 08/23/2019 10:59 AM    Hemoglobin A1c 7.8 (H) 04/22/2019 09:58 AM    Hemoglobin A1c, External 6.1 01/23/2020    Glucose 193 (H) 09/14/2020 01:55 PM    Glucose  07/01/2017 09:13 AM    Microalbumin/Creat ratio (mg/g creat) Cannot be calculated 09/14/2020 01:55 PM    Microalbumin,urine random <0.50 09/14/2020 01:55 PM    LDL, calculated 77.6 09/14/2020 01:55 PM    Creatinine 1.23 09/14/2020 01:55 PM      Lab Results   Component Value Date/Time    Cholesterol, total 155 09/14/2020 01:55 PM    HDL Cholesterol 44 09/14/2020 01:55 PM    LDL, calculated 77.6 09/14/2020 01:55 PM    LDL-C, External 71 01/24/2020    Triglyceride 167 (H) 09/14/2020 01:55 PM    CHOL/HDL Ratio 3.5 09/14/2020 01:55 PM     Lab Results   Component Value Date/Time    ALT (SGPT) 22 09/14/2020 01:55 PM    Alk. phosphatase 88 09/14/2020 01:55 PM    Bilirubin, total 0.3 09/14/2020 01:55 PM    Albumin 4.1 09/14/2020 01:55 PM    Protein, total 7.0 09/14/2020 01:55 PM     Lab Results   Component Value Date/Time    GFR est non-AA 57 (L) 09/14/2020 01:55 PM    GFR est AA >60 09/14/2020 01:55 PM    Creatinine 1.23 09/14/2020 01:55 PM    BUN 20 09/14/2020 01:55 PM    Sodium 141 09/14/2020 01:55 PM    Potassium 4.8 09/14/2020 01:55 PM    Chloride 104 09/14/2020 01:55 PM    CO2 26 09/14/2020 01:55 PM    Magnesium 2.2 08/24/2018 09:34 AM               ASSESSMENT and PLAN       1. Type 2 diabetes mellitus, uncontrolled.  A1C is     7.9 %      From   Sept 2020      comapred to    6.1 %  From jan 2020   Compared to    8.4 %  From   August 2019     compared to     7.8 %      From     April 2019     Compared to  8.6 %     From dec 2018      compared to   7.7 %      From     August 2018    Compared to   8.3 %     From may 2018    Compared to   8 %     From oct 29 2017    compared to  7.9 %     From     July 1 2017   8.3   %    From  March 2016   Compared to  8.2 %     From  Dec 2016   comapred to 8.8 %    From sept 2016  compared to  8.1 %     From march 2015 Sept 2020   He has stopped taking bydureon   Asking him to resume it  - or  Weekly trulicity if that is covered at low cost   He will stay on basal insulin and prandin   He will stay on metformin     May 2020   Good glycemic control in jan 2020   Metformin er 1 gm bid   lantus plus prandin to be continued         August 2019  :    Losing   glycemic control  ,  Fasting sugars ranging from 130 to 190 mg   asking for higher doses of metformin , will do   He does  not   Like   taking humalog with dinner  And  I resumed prandin with dinner too   Explained to him that he has to adjust to prandin if he does not like to be on shots   On bydureon optional  Based on cost   He is explained about the help he can get from taking humalog versus being on prandin and he is willing to try it   Could not tolerate victoza as he had GI distress       2. Hypertension. Blood pressure is well controlled on lisinopril, HCTZ and carvedilol. 3. Dyslipidemia :  Lipids are great, Zocor 40 mg is being continued. 4. PAD : normal BRIDGER     5. CAD/CABG : 2003-  afib- s/p cardioablation- on pradexa  And Tikosyn   F/u with dr. Harriett Borrero and Dr Jose Manuel Zurita     6   Obesity : Body mass index is 38.08 kg/m². TLC advised   bydureon optional by cost       7.  Hypothyroidism :  TSH  Was 12  From jan 2020  NEW DIAGNOSIS  Repeat labs are never abnormal   So no synthroid is prescribed            > 50 % of time is spent on counseling   Patient voiced understanding her plan of care

## 2020-11-05 LAB — PSA, EXTERNAL: 0.7

## 2020-11-24 RX ORDER — LANCETS 28 GAUGE
EACH MISCELLANEOUS
Qty: 150 LANCET | Refills: 3 | Status: SHIPPED | OUTPATIENT
Start: 2020-11-24 | End: 2021-03-11

## 2020-12-17 LAB
CREATININE, EXTERNAL: 0.93
HBA1C MFR BLD HPLC: 6.2 %
LDL-C, EXTERNAL: 64

## 2021-01-04 ENCOUNTER — TRANSCRIBE ORDER (OUTPATIENT)
Dept: SCHEDULING | Age: 76
End: 2021-01-04

## 2021-01-04 DIAGNOSIS — R19.00 ABDOMINAL LUMP: Primary | ICD-10-CM

## 2021-01-22 ENCOUNTER — HOSPITAL ENCOUNTER (OUTPATIENT)
Dept: ULTRASOUND IMAGING | Age: 76
Discharge: HOME | End: 2021-01-22
Payer: MEDICARE

## 2021-01-22 DIAGNOSIS — R19.00 ABDOMINAL LUMP: ICD-10-CM

## 2021-01-22 PROCEDURE — 76705 ECHO EXAM OF ABDOMEN: CPT

## 2021-02-01 ENCOUNTER — OFFICE VISIT (OUTPATIENT)
Dept: ENDOCRINOLOGY | Age: 76
End: 2021-02-01
Payer: MEDICARE

## 2021-02-01 VITALS
BODY MASS INDEX: 39.4 KG/M2 | DIASTOLIC BLOOD PRESSURE: 56 MMHG | HEART RATE: 68 BPM | WEIGHT: 281.4 LBS | TEMPERATURE: 99.2 F | OXYGEN SATURATION: 95 % | HEIGHT: 71 IN | RESPIRATION RATE: 18 BRPM | SYSTOLIC BLOOD PRESSURE: 143 MMHG

## 2021-02-01 DIAGNOSIS — Z79.4 ENCOUNTER FOR LONG-TERM (CURRENT) USE OF INSULIN (HCC): ICD-10-CM

## 2021-02-01 DIAGNOSIS — I48.91 ATRIAL FIBRILLATION, UNSPECIFIED TYPE (HCC): ICD-10-CM

## 2021-02-01 DIAGNOSIS — E11.65 UNCONTROLLED TYPE 2 DIABETES MELLITUS WITH HYPERGLYCEMIA (HCC): Primary | ICD-10-CM

## 2021-02-01 DIAGNOSIS — I10 ESSENTIAL HYPERTENSION: ICD-10-CM

## 2021-02-01 DIAGNOSIS — E78.2 MIXED HYPERLIPIDEMIA: ICD-10-CM

## 2021-02-01 PROCEDURE — G8427 DOCREV CUR MEDS BY ELIG CLIN: HCPCS | Performed by: INTERNAL MEDICINE

## 2021-02-01 PROCEDURE — G8753 SYS BP > OR = 140: HCPCS | Performed by: INTERNAL MEDICINE

## 2021-02-01 PROCEDURE — 1101F PT FALLS ASSESS-DOCD LE1/YR: CPT | Performed by: INTERNAL MEDICINE

## 2021-02-01 PROCEDURE — G8510 SCR DEP NEG, NO PLAN REQD: HCPCS | Performed by: INTERNAL MEDICINE

## 2021-02-01 PROCEDURE — G8754 DIAS BP LESS 90: HCPCS | Performed by: INTERNAL MEDICINE

## 2021-02-01 PROCEDURE — G8417 CALC BMI ABV UP PARAM F/U: HCPCS | Performed by: INTERNAL MEDICINE

## 2021-02-01 PROCEDURE — G8536 NO DOC ELDER MAL SCRN: HCPCS | Performed by: INTERNAL MEDICINE

## 2021-02-01 PROCEDURE — 99214 OFFICE O/P EST MOD 30 MIN: CPT | Performed by: INTERNAL MEDICINE

## 2021-02-01 NOTE — PATIENT INSTRUCTIONS
SPECIFIC INSTRUCTIONS BELOW Metformin  mg  2 pills    Twice a  Day with food Check blood sugars immediately before each meal and at bedtime NPH    novolin    reli-on   insulin 70 units at bed time ( vials) Take repaglinide   2 mg right before b-fast ,  lunch  And dinner CARB max is 30 gm per meal  
 
Less than 70 mg NO INSULIN 
 
 
 
Do not skip meals Do not eat in between meals Reduce carbs- pasta, rice, potatoes, bread Do not drink juices or sodas Do not eat peanut butter Do not eat sugar free cookies and cakes Do not eat grapes, ornages, pine apple and peaches , raisins PAY ATTENTION TO THESE GENERAL INSTRUCTIONS  
 
- HEALTH MAINTENANCE IS NOT GOING TO BE UP TO DATE ON YOUR AVS- PLEASE IGNORE  
- YOUR MED LIST IS NOT UP TO DATE AS SOME CHANGES ARE BEING MADE AFTER THE VISIT - FOLLOW SPECIFIC INSTRUCTIONS ABOVE Results *Normal results will not be notified by a phone call starting January 1 2021 *If you have an upcoming visit, the results will be discussed at the visit *Please sign up for MY CHART if you want access to your lab and test results *Abnormal results which require immediate attention will be notified by phone call *Abnormal results which do not require immediate assistance will be notified in 1-2 weeks Refills    -    have your pharmacy send us a refill request 
Phone calls  -  Allow  24 hrs. for non-urgent calls to be returned Prior authorization - It may take 2-4 weeks to process Forms  -  FMLA, DMV etc., will take up to 2 weeks to process Cancellations - please notify the office 2 days in advance Samples  - will only be dispensed at visits  
 
--------------------------------------------------------------------------------------------

## 2021-02-01 NOTE — PROGRESS NOTES
HISTORY OF PRESENT ILLNESS   Susana Seip is a 67 y.o. male. HPI   Patient here for follow up of Type 2 diabetes mellitus after his last visit in Sept 2020     Got  log with him  Compliant with meds       Old hsitory     Gained 3  lbs   forgot the meter , at the visit  ( he is always forget ful )  denies lack of  compliance with diet  Not able to take bydureon because of donut hole and he just got into catastrophic coverage   He has not started on humalog and he stayed on prandin  He appears not  depressed today        Prior DM history-   H/o DM 2 for 11 yrs. Discontinued victoza sec to diarrhoea and higher co-pay. Started back on Byetta   Current diabetic medications include Metformin, glipizide and Byetta.    Current monitoring regimen: home blood tests - 2 times daily   Home blood sugar records: fasting range: 140-10mg postprandial range: 130-160 mg   Post prandial more than 300 mg   Current exercise: no regular exercise   Known diabetic complications: peripheral neuropathy and cardiovascular diseases/p CABG         Past Medical History:   Diagnosis Date    Arthritis     CAD (coronary artery disease) of artery bypass graft CABG in jan 2003    dr. Elodia Romano Cellulitis        Social History     Socioeconomic History    Marital status:      Spouse name: Not on file    Number of children: Not on file    Years of education: Not on file    Highest education level: Not on file   Occupational History    Not on file   Social Needs    Financial resource strain: Not on file    Food insecurity     Worry: Not on file     Inability: Not on file    Transportation needs     Medical: Not on file     Non-medical: Not on file   Tobacco Use    Smoking status: Never Smoker    Smokeless tobacco: Never Used   Substance and Sexual Activity    Alcohol use: No    Drug use: No    Sexual activity: Not on file   Lifestyle    Physical activity     Days per week: Not on file     Minutes per session: Not on file  Stress: Not on file   Relationships    Social connections     Talks on phone: Not on file     Gets together: Not on file     Attends Bahai service: Not on file     Active member of club or organization: Not on file     Attends meetings of clubs or organizations: Not on file     Relationship status: Not on file    Intimate partner violence     Fear of current or ex partner: Not on file     Emotionally abused: Not on file     Physically abused: Not on file     Forced sexual activity: Not on file   Other Topics Concern    Not on file   Social History Narrative    Not on file       History reviewed. No pertinent family history. Review of Systems   Constitutional: Negative. HENT: Negative. Eyes: Negative for pain and redness. Respiratory: Negative. Cardiovascular: Negative for chest pain, palpitations and leg swelling. Physical Exam   Constitutional: oriented to person, place, and time. appears well-developed and well-nourished. HENT:   Head: Normocephalic. Eyes: normal , noted no swelling or redness. Neck: Normal range of motion.     Cardiovascular: could nto be examined  Pulmonary/Chest: appears breathing effortlessly        Lab Results   Component Value Date/Time    Hemoglobin A1c 7.9 (H) 09/14/2020 01:55 PM    Hemoglobin A1c 8.4 (H) 08/23/2019 10:59 AM    Hemoglobin A1c 7.8 (H) 04/22/2019 09:58 AM    Hemoglobin A1c, External 6.1 01/23/2020    Glucose 193 (H) 09/14/2020 01:55 PM    Glucose  07/01/2017 09:13 AM    Microalbumin/Creat ratio (mg/g creat) Cannot be calculated 09/14/2020 01:55 PM    Microalbumin,urine random <0.50 09/14/2020 01:55 PM    LDL, calculated 77.6 09/14/2020 01:55 PM    Creatinine 1.23 09/14/2020 01:55 PM      Lab Results   Component Value Date/Time    Cholesterol, total 155 09/14/2020 01:55 PM    HDL Cholesterol 44 09/14/2020 01:55 PM    LDL, calculated 77.6 09/14/2020 01:55 PM    LDL-C, External 71 01/24/2020    Triglyceride 167 (H) 09/14/2020 01:55 PM    CHOL/HDL Ratio 3.5 09/14/2020 01:55 PM     Lab Results   Component Value Date/Time    ALT (SGPT) 22 09/14/2020 01:55 PM    Alk. phosphatase 88 09/14/2020 01:55 PM    Bilirubin, total 0.3 09/14/2020 01:55 PM    Albumin 4.1 09/14/2020 01:55 PM    Protein, total 7.0 09/14/2020 01:55 PM     Lab Results   Component Value Date/Time    GFR est non-AA 57 (L) 09/14/2020 01:55 PM    GFR est AA >60 09/14/2020 01:55 PM    Creatinine 1.23 09/14/2020 01:55 PM    BUN 20 09/14/2020 01:55 PM    Sodium 141 09/14/2020 01:55 PM    Potassium 4.8 09/14/2020 01:55 PM    Chloride 104 09/14/2020 01:55 PM    CO2 26 09/14/2020 01:55 PM    Magnesium 2.2 08/24/2018 09:34 AM         ASSESSMENT and PLAN       1. Type 2 diabetes mellitus, uncontrolled. A1C is  6.2%     From dec 2020    Compared to     7.9 %      From   Sept 2020      comapred to    6.1 %  From jan 2020   Compared to    8.4 %  From   August 2019     compared to     7.8 %      From     April 2019     Compared to  8.6 %     From dec 2018      compared to   7.7 %      From     August 2018    Compared to   8.3 %     From may 2018    Compared to   8 %     From oct 29 2017    compared to  7.9 %     From     July 1 2017   8.3   %    From  March 2016   Compared to  8.2 %     From  Dec 2016   comapred to 8.8 %    From sept 2016  compared to  8.1 %     From march 2015 Feb 2021     Good glycemic control   He will stay on basal insulin and prandin , metformin    HE SAYS HE HAS NOT USED BYDUREON  FOR COST REASONS       Sept 2020   He has stopped taking bydureon   Asking him to resume it  - or  Weekly trulicity if that is covered at low cost   He will stay on basal insulin and prandin   He will stay on metformin     May 2020   Good glycemic control in jan 2020   Metformin er 1 gm bid   lantus plus prandin to be continued       2. Hypertension. Blood pressure is well controlled on lisinopril, HCTZ and carvedilol.      3. Dyslipidemia :  Lipids are great, Zocor 40 mg is being continued. 4. PAD : normal BRIDGER     5. CAD/CABG : 2003-  afib- s/p cardioablation- on pradexa  And Tikosyn   F/u with dr. Claude Akers and Dr Jose Manuel Restrepo     6   Obesity : Body mass index is 39.25 kg/m². TLC advised   bydu      7.  THYROID DISORDER UNSPECIFIED    TSH  Was 12  From jan 2020  NEW DIAGNOSIS  Repeat labs are never abnormal   So no synthroid is prescribed      AGAIN DEC 2020  - LABS ARE NORMAL           > 50 % of time is spent on counseling   Patient voiced understanding her plan of care

## 2021-02-01 NOTE — LETTER
2/1/2021 Patient: Price Gomez YOB: 1945 Date of Visit: 2/1/2021 Arsenio Severance, MD 
[address] [facility] [address] Via Fax: [phone] Dear Arsenio Severance, MD, Thank you for referring Mr. Chris Alvarez to [address] [place] [address] for evaluation. My notes for this consultation are attached. If you have questions, please do not hesitate to call me. I look forward to following your patient along with you. Sincerely, Renato Stephen MD

## 2021-02-01 NOTE — PROGRESS NOTES
1. Have you been to the ER, urgent care clinic since your last visit? No  Hospitalized since your last visit? No    2. Have you seen or consulted any other health care providers outside of the [address] [place] [address] since your last visit? Include any pap smears or colon screening. No    Wt Readings from Last 3 Encounters:   02/01/21 281 lb 6.4 oz (127.6 kg)   09/21/20 273 lb (123.8 kg)   08/26/19 264 lb 8 oz (120 kg)     Temp Readings from Last 3 Encounters:   02/01/21 99.2 °F (37.3 °C) (Oral)   09/21/20 98.7 °F (37.1 °C) (Oral)   08/26/19 98.4 °F (36.9 °C) (Oral)     BP Readings from Last 3 Encounters:   02/01/21 (!) 143/56   09/21/20 (!) 115/50   08/26/19 133/46     Pulse Readings from Last 3 Encounters:   02/01/21 68   09/21/20 63   08/26/19 66     Lab Results   Component Value Date/Time    Hemoglobin A1c 7.9 (H) 09/14/2020 01:55 PM    Hemoglobin A1c (POC) 7.9 07/01/2017 09:14 AM    Hemoglobin A1c, External 6.1 01/23/2020     Patient does not have meter today. Will bring in on Wednesday.

## 2021-03-11 RX ORDER — LANCETS
EACH MISCELLANEOUS
Qty: 300 EACH | Refills: 3 | Status: SHIPPED | OUTPATIENT
Start: 2021-03-11 | End: 2022-07-09

## 2021-06-30 RX ORDER — CALCIUM CITRATE/VITAMIN D3 200MG-6.25
TABLET ORAL
Qty: 100 STRIP | Refills: 4 | Status: SHIPPED | OUTPATIENT
Start: 2021-06-30 | End: 2022-02-04 | Stop reason: SDUPTHER

## 2021-08-11 RX ORDER — CALCIUM CARB/VITAMIN D3/VIT K1 500-100-40
TABLET,CHEWABLE ORAL
Qty: 60 SYRINGE | Refills: 4 | Status: SHIPPED | OUTPATIENT
Start: 2021-08-11 | End: 2022-08-08

## 2021-09-05 RX ORDER — REPAGLINIDE 2 MG/1
TABLET ORAL
Qty: 270 TABLET | Refills: 4 | Status: SHIPPED | OUTPATIENT
Start: 2021-09-05 | End: 2022-02-18 | Stop reason: SDUPTHER

## 2022-02-04 ENCOUNTER — TELEPHONE (OUTPATIENT)
Dept: ENDOCRINOLOGY | Age: 77
End: 2022-02-04

## 2022-02-04 DIAGNOSIS — E11.65 UNCONTROLLED TYPE 2 DIABETES MELLITUS WITH HYPERGLYCEMIA (HCC): Primary | ICD-10-CM

## 2022-02-04 RX ORDER — CALCIUM CITRATE/VITAMIN D3 200MG-6.25
TABLET ORAL
Qty: 200 STRIP | Refills: 4 | Status: SHIPPED | OUTPATIENT
Start: 2022-02-04 | End: 2022-02-04 | Stop reason: SDUPTHER

## 2022-02-04 RX ORDER — CALCIUM CITRATE/VITAMIN D3 200MG-6.25
TABLET ORAL
Qty: 200 STRIP | Refills: 1 | Status: SHIPPED | OUTPATIENT
Start: 2022-02-04

## 2022-02-04 NOTE — TELEPHONE ENCOUNTER
He did nto make follow up  and it crossed a year     I can do refill for 2 months and give him an appt and labs bnv

## 2022-02-04 NOTE — TELEPHONE ENCOUNTER
Pt states his refill request got denied by provider. Wonder what he can do. Please advise patient ASAP.  HYNU

## 2022-02-04 NOTE — TELEPHONE ENCOUNTER
Called patient and advised him of Dr Trav Spangler comments. He verbalized understanding. Advised patient will have someone from the front call him to schedule an appointment with labs prior. Requested Prescriptions     Pending Prescriptions Disp Refills    glucose blood VI test strips (True Metrix Glucose Test Strip) strip 200 Strip 1     Sig: To check 3 times a day     Verbal order read back to Dr Kb Dey to send refill.

## 2022-02-14 LAB
ALBUMIN SERPL-MCNC: 4.4 G/DL (ref 3.7–4.7)
ALBUMIN/GLOB SERPL: 2.1 {RATIO} (ref 1.2–2.2)
ALP SERPL-CCNC: 79 IU/L (ref 44–121)
ALT SERPL-CCNC: 10 IU/L (ref 0–44)
AST SERPL-CCNC: 15 IU/L (ref 0–40)
BILIRUB SERPL-MCNC: 0.3 MG/DL (ref 0–1.2)
BUN SERPL-MCNC: 23 MG/DL (ref 8–27)
BUN/CREAT SERPL: 20 (ref 10–24)
CALCIUM SERPL-MCNC: 9.4 MG/DL (ref 8.6–10.2)
CHLORIDE SERPL-SCNC: 98 MMOL/L (ref 96–106)
CO2 SERPL-SCNC: 24 MMOL/L (ref 20–29)
CREAT SERPL-MCNC: 1.15 MG/DL (ref 0.76–1.27)
EST. AVERAGE GLUCOSE BLD GHB EST-MCNC: 197 MG/DL
GLOBULIN SER CALC-MCNC: 2.1 G/DL (ref 1.5–4.5)
GLUCOSE SERPL-MCNC: 171 MG/DL (ref 65–99)
HBA1C MFR BLD: 8.5 % (ref 4.8–5.6)
POTASSIUM SERPL-SCNC: 4.9 MMOL/L (ref 3.5–5.2)
PROT SERPL-MCNC: 6.5 G/DL (ref 6–8.5)
SODIUM SERPL-SCNC: 138 MMOL/L (ref 134–144)
TSH SERPL DL<=0.005 MIU/L-ACNC: 1.3 UIU/ML (ref 0.45–4.5)

## 2022-02-18 ENCOUNTER — OFFICE VISIT (OUTPATIENT)
Dept: ENDOCRINOLOGY | Age: 77
End: 2022-02-18
Payer: MEDICARE

## 2022-02-18 VITALS
BODY MASS INDEX: 38.08 KG/M2 | HEART RATE: 60 BPM | DIASTOLIC BLOOD PRESSURE: 45 MMHG | TEMPERATURE: 98 F | SYSTOLIC BLOOD PRESSURE: 142 MMHG | WEIGHT: 272 LBS | OXYGEN SATURATION: 93 % | HEIGHT: 71 IN

## 2022-02-18 DIAGNOSIS — I10 ESSENTIAL HYPERTENSION: ICD-10-CM

## 2022-02-18 DIAGNOSIS — Z79.4 ENCOUNTER FOR LONG-TERM (CURRENT) USE OF INSULIN (HCC): ICD-10-CM

## 2022-02-18 DIAGNOSIS — E78.2 MIXED HYPERLIPIDEMIA: ICD-10-CM

## 2022-02-18 DIAGNOSIS — I48.91 ATRIAL FIBRILLATION, UNSPECIFIED TYPE (HCC): ICD-10-CM

## 2022-02-18 DIAGNOSIS — E11.65 UNCONTROLLED TYPE 2 DIABETES MELLITUS WITH HYPERGLYCEMIA (HCC): Primary | ICD-10-CM

## 2022-02-18 PROCEDURE — 99214 OFFICE O/P EST MOD 30 MIN: CPT | Performed by: INTERNAL MEDICINE

## 2022-02-18 PROCEDURE — G8753 SYS BP > OR = 140: HCPCS | Performed by: INTERNAL MEDICINE

## 2022-02-18 PROCEDURE — 3052F HG A1C>EQUAL 8.0%<EQUAL 9.0%: CPT | Performed by: INTERNAL MEDICINE

## 2022-02-18 PROCEDURE — G8427 DOCREV CUR MEDS BY ELIG CLIN: HCPCS | Performed by: INTERNAL MEDICINE

## 2022-02-18 PROCEDURE — G8432 DEP SCR NOT DOC, RNG: HCPCS | Performed by: INTERNAL MEDICINE

## 2022-02-18 PROCEDURE — G8536 NO DOC ELDER MAL SCRN: HCPCS | Performed by: INTERNAL MEDICINE

## 2022-02-18 PROCEDURE — G8417 CALC BMI ABV UP PARAM F/U: HCPCS | Performed by: INTERNAL MEDICINE

## 2022-02-18 PROCEDURE — G8754 DIAS BP LESS 90: HCPCS | Performed by: INTERNAL MEDICINE

## 2022-02-18 PROCEDURE — 1101F PT FALLS ASSESS-DOCD LE1/YR: CPT | Performed by: INTERNAL MEDICINE

## 2022-02-18 RX ORDER — REPAGLINIDE 2 MG/1
TABLET ORAL
Qty: 270 TABLET | Refills: 4 | Status: SHIPPED | OUTPATIENT
Start: 2022-02-18

## 2022-02-18 RX ORDER — METFORMIN HYDROCHLORIDE 500 MG/1
TABLET, EXTENDED RELEASE ORAL
Qty: 360 TABLET | Refills: 1 | Status: SHIPPED | OUTPATIENT
Start: 2022-02-18 | End: 2022-09-29

## 2022-02-18 RX ORDER — DULAGLUTIDE 0.75 MG/.5ML
0.75 INJECTION, SOLUTION SUBCUTANEOUS
Qty: 6 ML | Refills: 1 | Status: SHIPPED | OUTPATIENT
Start: 2022-02-18 | End: 2022-07-15 | Stop reason: DRUGHIGH

## 2022-02-18 NOTE — PATIENT INSTRUCTIONS
SPECIFIC INSTRUCTIONS BELOW       Metformin  mg  2 pills    Twice a  Day with food     Start on trulicity  6.46 mg  A week          Check blood sugars immediately before each meal and at bedtime       NPH    novolin    reli-on   insulin 70 units at bed time ( vials)   ( decrease to  60 units at night  whne trulicity kicks in )    Take repaglinide   2 mg right before b-fast ,  lunch  And dinner     CARB max is 30 gm per meal     Less than 70 mg NO INSULIN        Do not skip meals  Do not eat in between meals  Reduce carbs- pasta, rice, potatoes, bread   Do not drink juices or sodas  Do not eat peanut butter     Do not eat sugar free cookies and cakes   Do not eat grapes, ornages, pine apple and peaches , raisins                -------------PAY ATTENTION TO THESE GENERAL INSTRUCTIONS -----------------      - The medications prescribed at this visit will not be available at pharmacy until 6 pm       - YOUR MED LIST IS NOT UP TO DATE AS SOME CHANGES ARE BEING MADE AFTER THE VISIT - FOLLOW SPECIFIC INSTRUCTIONS  ABOVE     -ANY tests other than blood work, which you opt to do  outside the  [facility] imaging facilities, you are responsible for prior authorizations if  required    - [address] UP TO DATE ON YOUR AVS- PLEASE IGNORE     Results     *Normal results will not be notified by a phone call starting January 1 2021   *If you have an upcoming visit, the results will be discussed at the visit   *Please sign up for MY CHART if you want access to your lab and test results  *Abnormal results which require immediate attention will be notified by phone call   *Abnormal results which do not require immediate assistance will be notified in 1-2 weeks       Refills    -    have your pharmacy send us a refill request . Refills are done max for one year and a visit is a must before refills are extended    Follow up appointments -  highly encourage you to make it when you are checking out.  We can accommodate you into the schedule based on your clinical situation, but not for extending refills beyond a year. Labs are important to give refills and is important to get labs before the visit     Phone calls  -  Allow  24 hrs.  for non-urgent calls to be returned  Prior authorization - It may take 2-4 weeks to process  Forms  -  FMLA, DMV etc., will take up to 2 weeks to process  Cancellations - please notify the office 2 days in advance   Samples  - will only be dispensed at visits       If not showing for the appointments and cancelling appointments within 24 hours are kept track of and three  of such situations in  two consecutive years will likely be considered for termination from the practice    -------------------------------------------------------------------------------------------------------------------

## 2022-02-18 NOTE — LETTER
2/20/2022    Patient: Sushil Bazan   YOB: 1945   Date of Visit: 2/18/2022     Alec Montoya MD  [address] [place] [id number]  Via Fax: [phone]    Dear Alec Montoya MD,      Thank you for referring Mr. Bret Ivory to [address] [place] [address] for evaluation. My notes for this consultation are attached. If you have questions, please do not hesitate to call me. I look forward to following your patient along with you.       Sincerely,    Maia Florentino MD

## 2022-02-18 NOTE — PROGRESS NOTES
Veronica Gomez is a 65 y.o. male here for   Chief Complaint   Patient presents with    Diabetes       1. Have you been to the ER, urgent care clinic since your last visit? Hospitalized since your last visit? - no    2. Have you seen or consulted any other health care providers outside of the [address] [place] [address] since your last visit?   Include any pap smears or colon screening.- PCP, Optometrist Dr. Yves Rodriguez, Infectious Disease Dr. Terry Flores, Dermatologist, Neurologist

## 2022-02-18 NOTE — PROGRESS NOTES
HISTORY OF PRESENT ILLNESS     Yemi Dawson is a 65 y.o. male. HPI   Patient here for follow up of Type 2 diabetes mellitus after his last visit in feb 2021     A year of gap   Pt forgot to make follow up , had to get him In when refills are requested     He is diagnosed with neuropathy in the interim he says   He felt the flutter in his chest while driving , saw Dr. Bebeto Espinal and he is now going to wear the monitor     He had amoxycillin for dental work , which also helped his left leg cellulitis       Feb 2021     Got  log with him  Compliant with meds       Old hsitory     Gained 3  lbs   forgot the meter , at the visit  ( he is always forget ful )  denies lack of  compliance with diet  Not able to take bydureon because of donut hole and he just got into catastrophic coverage   He has not started on humalog and he stayed on prandin  He appears not  depressed today        Prior DM history-   H/o DM 2 for 11 yrs. Discontinued victoza sec to diarrhoea and higher co-pay. Started back on Юлия   Current diabetic medications include Metformin, glipizide and Byetta. Current monitoring regimen: home blood tests - 2 times daily   Home blood sugar records: fasting range: 140-10mg postprandial range: 130-160 mg   Post prandial more than 300 mg   Current exercise: no regular exercise   Known diabetic complications: peripheral neuropathy and cardiovascular diseases/p CABG       Review of Systems   Constitutional: Negative. Cardiovascular: Negative for chest pain, palpitations and leg swelling. Physical Exam   Constitutional: oriented to person, place, and time. appears well-developed and well-nourished.    Pulmonary/Chest: appears breathing effortlessly        Lab Results   Component Value Date/Time    Hemoglobin A1c 8.5 (H) 02/11/2022 12:00 AM    Hemoglobin A1c 7.9 (H) 09/14/2020 01:55 PM    Hemoglobin A1c 8.4 (H) 08/23/2019 10:59 AM    Hemoglobin A1c, External 6.2 12/17/2020 10:08 AM    Hemoglobin A1c, External 6.1 01/23/2020 12:00 AM    Glucose 171 (H) 02/11/2022 12:00 AM    Glucose  07/01/2017 09:13 AM    Microalbumin/Creat ratio (mg/g creat) Cannot be calculated 09/14/2020 01:55 PM    Microalbumin,urine random <0.50 09/14/2020 01:55 PM    LDL, calculated 77.6 09/14/2020 01:55 PM    Creatinine 1.15 02/11/2022 12:00 AM      Lab Results   Component Value Date/Time    Cholesterol, total 155 09/14/2020 01:55 PM    HDL Cholesterol 44 09/14/2020 01:55 PM    LDL, calculated 77.6 09/14/2020 01:55 PM    LDL-C, External 64 12/17/2020 10:08 AM    Triglyceride 167 (H) 09/14/2020 01:55 PM    CHOL/HDL Ratio 3.5 09/14/2020 01:55 PM     Lab Results   Component Value Date/Time    ALT (SGPT) 10 02/11/2022 12:00 AM    Alk. phosphatase 79 02/11/2022 12:00 AM    Bilirubin, total 0.3 02/11/2022 12:00 AM    Albumin 4.4 02/11/2022 12:00 AM    Protein, total 6.5 02/11/2022 12:00 AM     Lab Results   Component Value Date/Time    GFR est non-AA 61 02/11/2022 12:00 AM    GFR est AA 70 02/11/2022 12:00 AM    Creatinine 1.15 02/11/2022 12:00 AM    BUN 23 02/11/2022 12:00 AM    Sodium 138 02/11/2022 12:00 AM    Potassium 4.9 02/11/2022 12:00 AM    Chloride 98 02/11/2022 12:00 AM    CO2 24 02/11/2022 12:00 AM    Magnesium 2.2 08/24/2018 09:34 AM         ASSESSMENT and PLAN       1. Type 2 diabetes mellitus, uncontrolled.  A1C is    8.5 %    From  Feb 2022   Compared to    6.2%     From dec 2020    Compared to 7.9 %      From   Sept 2020      comapred to    6.1 %  From jan 2020   Compared to    8.4 %  From   August 2019     compared to     7.8 %      From     April 2019     Compared to  8.6 %     From dec 2018      compared to   7.7 %      From     August 2018    Compared to   8.3 %     From may 2018    Compared to   8 %     From oct 29 2017    compared to  7.9 %     From     July 1 2017 Feb 2022     LOST control   He will stay on basal insulin and prandin same doses despite lost control   He says addicted to  Sugars   Not sure if  This  Is  From almost  Low like sugars   OR the dropping of sugar   He will stay on metformin     Will try weekly Trulicity         Feb 5506     Good glycemic control   He will stay on basal insulin and prandin , metformin    HE SAYS HE HAS NOT USED BYDUREON  FOR COST REASONS       2. Hypertension. Blood pressure is well controlled on lisinopril, HCTZ and carvedilol. 3. Dyslipidemia :  Lipids are great, Zocor 40 mg is being continued. 4. PAD : normal BRIDGER     5. CAD/CABG : 2003-  afib- s/p cardioablation- on pradexa  And Tikosyn   F/u with dr. Lawanda Kenney and Dr Jose Manuel Rabago     6   Obesity : Body mass index is 37.94 kg/m². TLC advised , bydureon       7.  THYROID DISORDER UNSPECIFIED   Repeat labs are never abnormal   So no synthroid is prescribed        Reviewed results with patient and discussed the labs being ordered today/bnv  Patient voiced understanding of plan of care

## 2022-05-19 ENCOUNTER — TELEPHONE (OUTPATIENT)
Dept: ENDOCRINOLOGY | Age: 77
End: 2022-05-19

## 2022-05-19 NOTE — TELEPHONE ENCOUNTER
Spoke with patient and assured him that being off the metformin was okay, per Dr. Yamileth Naqvi instruction. Patient confirmed that he has started the Trulicity.

## 2022-05-19 NOTE — TELEPHONE ENCOUNTER
Patient called and advised that he has a heart scan scheduled for tomorrow and the instructions are to stay of metformin for 24 before procedure and 48 hours after the procedure. Patient is very concerned and would like to know if Dr. Jaxon Brown has any suggestions and would like a call back today.

## 2022-07-09 RX ORDER — LANCETS
EACH MISCELLANEOUS
Qty: 300 EACH | Refills: 3 | Status: SHIPPED | OUTPATIENT
Start: 2022-07-09

## 2022-07-11 LAB
ALBUMIN SERPL-MCNC: 4.7 G/DL (ref 3.7–4.7)
ALBUMIN/GLOB SERPL: 2.4 {RATIO} (ref 1.2–2.2)
ALP SERPL-CCNC: 79 IU/L (ref 44–121)
ALT SERPL-CCNC: 9 IU/L (ref 0–44)
AST SERPL-CCNC: 15 IU/L (ref 0–40)
BILIRUB SERPL-MCNC: 0.4 MG/DL (ref 0–1.2)
BUN SERPL-MCNC: 25 MG/DL (ref 8–27)
BUN/CREAT SERPL: 20 (ref 10–24)
CALCIUM SERPL-MCNC: 9.2 MG/DL (ref 8.6–10.2)
CHLORIDE SERPL-SCNC: 97 MMOL/L (ref 96–106)
CHOLEST SERPL-MCNC: 185 MG/DL (ref 100–199)
CO2 SERPL-SCNC: 20 MMOL/L (ref 20–29)
CREAT SERPL-MCNC: 1.26 MG/DL (ref 0.76–1.27)
EGFR: 58 ML/MIN/1.73
EST. AVERAGE GLUCOSE BLD GHB EST-MCNC: 186 MG/DL
GLOBULIN SER CALC-MCNC: 2 G/DL (ref 1.5–4.5)
GLUCOSE SERPL-MCNC: 192 MG/DL (ref 65–99)
HBA1C MFR BLD: 8.1 % (ref 4.8–5.6)
HDLC SERPL-MCNC: 43 MG/DL
IMP & REVIEW OF LAB RESULTS: NORMAL
INTERPRETATION: NORMAL
LDLC SERPL CALC-MCNC: 111 MG/DL (ref 0–99)
POTASSIUM SERPL-SCNC: 4.2 MMOL/L (ref 3.5–5.2)
PROT SERPL-MCNC: 6.7 G/DL (ref 6–8.5)
SODIUM SERPL-SCNC: 138 MMOL/L (ref 134–144)
TRIGL SERPL-MCNC: 177 MG/DL (ref 0–149)
VLDLC SERPL CALC-MCNC: 31 MG/DL (ref 5–40)

## 2022-07-14 LAB
ALBUMIN/CREAT UR: 11 MG/G CREAT (ref 0–29)
CREAT UR-MCNC: 81.1 MG/DL
MICROALBUMIN UR-MCNC: 8.8 UG/ML

## 2022-07-15 ENCOUNTER — OFFICE VISIT (OUTPATIENT)
Dept: ENDOCRINOLOGY | Age: 77
End: 2022-07-15
Payer: MEDICARE

## 2022-07-15 VITALS
OXYGEN SATURATION: 93 % | DIASTOLIC BLOOD PRESSURE: 59 MMHG | HEART RATE: 91 BPM | BODY MASS INDEX: 36.46 KG/M2 | SYSTOLIC BLOOD PRESSURE: 141 MMHG | TEMPERATURE: 97.1 F | RESPIRATION RATE: 18 BRPM | HEIGHT: 71 IN | WEIGHT: 260.4 LBS

## 2022-07-15 DIAGNOSIS — E11.65 UNCONTROLLED TYPE 2 DIABETES MELLITUS WITH HYPERGLYCEMIA (HCC): Primary | ICD-10-CM

## 2022-07-15 DIAGNOSIS — I10 ESSENTIAL HYPERTENSION: ICD-10-CM

## 2022-07-15 DIAGNOSIS — E78.2 MIXED HYPERLIPIDEMIA: ICD-10-CM

## 2022-07-15 DIAGNOSIS — Z79.4 ENCOUNTER FOR LONG-TERM (CURRENT) USE OF INSULIN (HCC): ICD-10-CM

## 2022-07-15 PROCEDURE — 1101F PT FALLS ASSESS-DOCD LE1/YR: CPT | Performed by: INTERNAL MEDICINE

## 2022-07-15 PROCEDURE — G8417 CALC BMI ABV UP PARAM F/U: HCPCS | Performed by: INTERNAL MEDICINE

## 2022-07-15 PROCEDURE — G8427 DOCREV CUR MEDS BY ELIG CLIN: HCPCS | Performed by: INTERNAL MEDICINE

## 2022-07-15 PROCEDURE — 99214 OFFICE O/P EST MOD 30 MIN: CPT | Performed by: INTERNAL MEDICINE

## 2022-07-15 PROCEDURE — 3052F HG A1C>EQUAL 8.0%<EQUAL 9.0%: CPT | Performed by: INTERNAL MEDICINE

## 2022-07-15 PROCEDURE — G8754 DIAS BP LESS 90: HCPCS | Performed by: INTERNAL MEDICINE

## 2022-07-15 PROCEDURE — 1123F ACP DISCUSS/DSCN MKR DOCD: CPT | Performed by: INTERNAL MEDICINE

## 2022-07-15 PROCEDURE — G8753 SYS BP > OR = 140: HCPCS | Performed by: INTERNAL MEDICINE

## 2022-07-15 PROCEDURE — G8536 NO DOC ELDER MAL SCRN: HCPCS | Performed by: INTERNAL MEDICINE

## 2022-07-15 PROCEDURE — G8510 SCR DEP NEG, NO PLAN REQD: HCPCS | Performed by: INTERNAL MEDICINE

## 2022-07-15 RX ORDER — DULAGLUTIDE 1.5 MG/.5ML
INJECTION, SOLUTION SUBCUTANEOUS
Qty: 6 ML | Refills: 3 | Status: SHIPPED | OUTPATIENT
Start: 2022-07-15 | End: 2022-09-21 | Stop reason: SDUPTHER

## 2022-07-15 NOTE — PROGRESS NOTES
HISTORY OF PRESENT ILLNESS     Shabana Soliz is a 65 y.o. male. HPI   Patient here for follow up of Type 2 diabetes mellitus after his last visit in feb 2022      He stayed overnight at [address] [place] [address] for cardiac ablation on June 23 2022   LOST   20    lbs   Tolerating trulicity well       Feb 2022     A year of gap   Pt forgot to make follow up , had to get him In when refills are requested     He is diagnosed with neuropathy in the interim he says   He felt the flutter in his chest while driving , saw Dr. Kylie Ash and he is now going to wear the monitor   He had amoxycillin for dental work , which also helped his left leg cellulitis           Prior DM history-   H/o DM 2 for 11 yrs. Discontinued victoza sec to diarrhoea and higher co-pay. Started back on Byetta   Current diabetic medications include Metformin, glipizide and Byetta. Current monitoring regimen: home blood tests - 2 times daily   Home blood sugar records: fasting range: 140-10mg postprandial range: 130-160 mg   Post prandial more than 300 mg   Current exercise: no regular exercise   Known diabetic complications: peripheral neuropathy and cardiovascular diseases/p CABG       Review of Systems   Constitutional: Negative. Cardiovascular: Negative for chest pain, palpitations and leg swelling. Physical Exam   Constitutional: oriented to person, place, and time. appears well-developed and well-nourished.    Pulmonary/Chest: appears breathing effortlessly        Lab Results   Component Value Date/Time    Hemoglobin A1c 8.1 (H) 07/08/2022 12:00 AM    Hemoglobin A1c 8.5 (H) 02/11/2022 12:00 AM    Hemoglobin A1c 7.9 (H) 09/14/2020 01:55 PM    Hemoglobin A1c, External 6.2 12/17/2020 10:08 AM    Hemoglobin A1c, External 6.1 01/23/2020 12:00 AM    Glucose 192 (H) 07/08/2022 12:00 AM    Glucose  07/01/2017 09:13 AM    Microalbumin/Creat ratio (mg/g creat) Cannot be calculated 09/14/2020 01:55 PM    Microalb/Creat ratio (ug/mg creat.) 11 07/13/2022 12:00 AM    Microalbumin,urine random <0.50 09/14/2020 01:55 PM    LDL, calculated 111 (H) 07/08/2022 12:00 AM    LDL, calculated 77.6 09/14/2020 01:55 PM    Creatinine 1.26 07/08/2022 12:00 AM      Lab Results   Component Value Date/Time    Cholesterol, total 185 07/08/2022 12:00 AM    HDL Cholesterol 43 07/08/2022 12:00 AM    LDL, calculated 111 (H) 07/08/2022 12:00 AM    LDL, calculated 77.6 09/14/2020 01:55 PM    LDL-C, External 64 12/17/2020 10:08 AM    Triglyceride 177 (H) 07/08/2022 12:00 AM    CHOL/HDL Ratio 3.5 09/14/2020 01:55 PM     Lab Results   Component Value Date/Time    ALT (SGPT) 9 07/08/2022 12:00 AM    Alk. phosphatase 79 07/08/2022 12:00 AM    Bilirubin, total 0.4 07/08/2022 12:00 AM    Albumin 4.7 07/08/2022 12:00 AM    Protein, total 6.7 07/08/2022 12:00 AM     Lab Results   Component Value Date/Time    GFR est non-AA 61 02/11/2022 12:00 AM    GFR est AA 70 02/11/2022 12:00 AM    Creatinine 1.26 07/08/2022 12:00 AM    BUN 25 07/08/2022 12:00 AM    Sodium 138 07/08/2022 12:00 AM    Potassium 4.2 07/08/2022 12:00 AM    Chloride 97 07/08/2022 12:00 AM    CO2 20 07/08/2022 12:00 AM    Magnesium 2.2 08/24/2018 09:34 AM         ASSESSMENT and PLAN       1. Type 2 diabetes mellitus, uncontrolled.  A1C is 8.1 %     From  July 2022   Compared to    8.5 %    From  Feb 2022   Compared to    6.2%     From dec 2020    Compared to 7.9 %      From   Sept 2020      comapred to    6.1 %  From jan 2020   Compared to    8.4 %  From   August 2019     compared to     7.8 %      From     April 2019     Compared to  8.6 %     From dec 2018      compared to   7.7 %      From     August 2018    Compared to   8.3 %     From may 2018          July 15 2022     Improved control  Stay on basal bolus regimen    Increased  trulicity  1.5 mg a week   Discussed   SGL2    Decrease lantus to 60 units          Feb 2022     LOST control   He will stay on basal insulin and prandin same doses despite lost control   He says addicted to  Sugars   Not sure if  This  Is  From almost  Low like sugars   OR the dropping of sugar   He will stay on metformin     Will try weekly Trulicity       2. Hypertension. Blood pressure is well controlled on lisinopril, HCTZ and carvedilol. 3. Dyslipidemia :  Lipids are great, Zocor 40 mg is being continued. 4. PAD : normal BRIDGER     5. CAD/CABG : 2003-  afib- s/p cardioablation- on pradexa  And Tikosyn   F/u with dr. Deja Vasquez and Dr Jose Manuel Jean     6   Obesity : Body mass index is 36.32 kg/m². trulicity increased       7.  THYROID DISORDER UNSPECIFIED   Repeat labs are never abnormal   So no synthroid is prescribed        Reviewed results with patient and discussed the labs being ordered today/bnv  Patient voiced understanding of plan of care

## 2022-07-15 NOTE — PATIENT INSTRUCTIONS
SPECIFIC INSTRUCTIONS BELOW       Metformin  mg  2 pills    Twice a  Day with food     Increase on trulicity  1.5 mg  A week          Check blood sugars immediately before each meal and at bedtime       NPH    novolin    reli-on   insulin 60 units at bed time ( vials)   ( decrease to  60 units at night  whne trulicity kicks in )    Take repaglinide   2 mg right before b-fast ,  lunch  And dinner     CARB max is 30 gm per meal     Less than 70 mg NO INSULIN        Do not skip meals  Do not eat in between meals  Reduce carbs- pasta, rice, potatoes, bread   Do not drink juices or sodas  Do not eat peanut butter     Do not eat sugar free cookies and cakes   Do not eat grapes, ornages, pine apple and peaches , raisins                  -------------PAY ATTENTION TO THESE GENERAL INSTRUCTIONS -----------------      - The medications prescribed at this visit will not be available at pharmacy until 6 pm       - YOUR MED LIST IS NOT UP TO DATE AS SOME CHANGES ARE BEING MADE AFTER THE VISIT - FOLLOW SPECIFIC INSTRUCTIONS  ABOVE     -ANY tests other than blood work, which you opt to do  outside the  [facility] imaging facilities, you are responsible for prior authorizations if  required    - [address] UP TO DATE ON YOUR AVS- PLEASE IGNORE     Results     *Normal results will not be notified by a phone call starting January 1 2021   *If you have an upcoming visit, the results will be discussed at the visit   *Please sign up for MY CHART if you want access to your lab and test results  *Abnormal results which require immediate attention will be notified by phone call   *Abnormal results which do not require immediate assistance will be notified in 1-2 weeks       Refills    -    have your pharmacy send us a refill request . Refills are done max for one year and a visit is a must before refills are extended    Follow up appointments -  highly encourage you to make it when you are checking out. We can accommodate you into the schedule based on your clinical situation, but not for extending refills beyond a year. Labs are important to give refills and is important to get labs before the visit     Phone calls  -  Allow  24 hrs.  for non-urgent calls to be returned  Prior authorization - It may take 2-4 weeks to process  Forms  -  FMLA, DMV etc., will take up to 2 weeks to process  Cancellations - please notify the office 2 days in advance   Samples  - will only be dispensed at visits       If not showing for the appointments and cancelling appointments within 24 hours are kept track of and three  of such situations in  two consecutive years will likely be considered for termination from the practice    -------------------------------------------------------------------------------------------------------------------

## 2022-07-15 NOTE — PROGRESS NOTES
1. Have you been to the ER, urgent care clinic since your last visit? No  Hospitalized since your last visit? No    2. Have you seen or consulted any other health care providers outside of the [address] [place] [address] since your last visit? Include any pap smears or colon screening.  No    Wt Readings from Last 3 Encounters:   07/15/22 260 lb 6.4 oz (118.1 kg)   02/18/22 272 lb (123.4 kg)   02/01/21 281 lb 6.4 oz (127.6 kg)     Temp Readings from Last 3 Encounters:   07/15/22 97.1 °F (36.2 °C) (Temporal)   02/18/22 98 °F (36.7 °C) (Temporal)   02/01/21 99.2 °F (37.3 °C) (Oral)     BP Readings from Last 3 Encounters:   07/15/22 (!) 141/59   02/18/22 (!) 142/45   02/01/21 (!) 143/56     Pulse Readings from Last 3 Encounters:   07/15/22 91   02/18/22 60   02/01/21 68     Lab Results   Component Value Date/Time    Hemoglobin A1c 8.1 (H) 07/08/2022 12:00 AM    Hemoglobin A1c (POC) 7.9 07/01/2017 09:14 AM    Hemoglobin A1c, External 6.2 12/17/2020 10:08 AM

## 2022-07-15 NOTE — LETTER
7/16/2022    Patient: Imelda Sykes   YOB: 1945   Date of Visit: 7/15/2022     Jj Thompson MD  [address] [place] [id number]  Via Fax: [phone]    Dear Jj Thompson MD,      Thank you for referring Mr. Hamilton Colon to [address] [place] [address] for evaluation. My notes for this consultation are attached. If you have questions, please do not hesitate to call me. I look forward to following your patient along with you.       Sincerely,    Herman Stevens MD

## 2022-08-08 RX ORDER — CALCIUM CARB/VITAMIN D3/VIT K1 500-100-40
TABLET,CHEWABLE ORAL
Qty: 60 EACH | Refills: 5 | Status: SHIPPED | OUTPATIENT
Start: 2022-08-08

## 2022-09-21 DIAGNOSIS — E11.65 UNCONTROLLED TYPE 2 DIABETES MELLITUS WITH HYPERGLYCEMIA (HCC): Primary | ICD-10-CM

## 2022-09-21 RX ORDER — DULAGLUTIDE 1.5 MG/.5ML
INJECTION, SOLUTION SUBCUTANEOUS
Qty: 6 ML | Refills: 3 | Status: SHIPPED | OUTPATIENT
Start: 2022-09-21 | End: 2022-10-27 | Stop reason: SDUPTHER

## 2022-09-29 DIAGNOSIS — E11.65 UNCONTROLLED TYPE 2 DIABETES MELLITUS WITH HYPERGLYCEMIA (HCC): ICD-10-CM

## 2022-09-29 DIAGNOSIS — Z79.4 ENCOUNTER FOR LONG-TERM (CURRENT) USE OF INSULIN (HCC): ICD-10-CM

## 2022-09-29 RX ORDER — METFORMIN HYDROCHLORIDE 500 MG/1
TABLET, EXTENDED RELEASE ORAL
Qty: 360 TABLET | Refills: 1 | Status: SHIPPED | OUTPATIENT
Start: 2022-09-29

## 2022-10-27 DIAGNOSIS — E11.65 UNCONTROLLED TYPE 2 DIABETES MELLITUS WITH HYPERGLYCEMIA (HCC): ICD-10-CM

## 2022-10-27 RX ORDER — DULAGLUTIDE 1.5 MG/.5ML
INJECTION, SOLUTION SUBCUTANEOUS
Qty: 6 ML | Refills: 3 | Status: SHIPPED | OUTPATIENT
Start: 2022-10-27

## 2023-01-20 ENCOUNTER — OFFICE VISIT (OUTPATIENT)
Dept: ENDOCRINOLOGY | Age: 78
End: 2023-01-20
Payer: MEDICARE

## 2023-01-20 VITALS
OXYGEN SATURATION: 96 % | TEMPERATURE: 97.2 F | SYSTOLIC BLOOD PRESSURE: 122 MMHG | BODY MASS INDEX: 36.65 KG/M2 | DIASTOLIC BLOOD PRESSURE: 54 MMHG | HEIGHT: 71 IN | WEIGHT: 261.8 LBS | RESPIRATION RATE: 18 BRPM | HEART RATE: 71 BPM

## 2023-01-20 DIAGNOSIS — I10 ESSENTIAL HYPERTENSION: ICD-10-CM

## 2023-01-20 DIAGNOSIS — Z79.4 ENCOUNTER FOR LONG-TERM (CURRENT) USE OF INSULIN (HCC): ICD-10-CM

## 2023-01-20 DIAGNOSIS — E11.65 UNCONTROLLED TYPE 2 DIABETES MELLITUS WITH HYPERGLYCEMIA (HCC): Primary | ICD-10-CM

## 2023-01-20 DIAGNOSIS — E11.65 UNCONTROLLED TYPE 2 DIABETES MELLITUS WITH HYPERGLYCEMIA (HCC): ICD-10-CM

## 2023-01-20 DIAGNOSIS — E78.2 MIXED HYPERLIPIDEMIA: ICD-10-CM

## 2023-01-20 RX ORDER — REPAGLINIDE 2 MG/1
TABLET ORAL
Qty: 270 TABLET | Refills: 4 | Status: SHIPPED | OUTPATIENT
Start: 2023-01-20

## 2023-01-20 NOTE — PATIENT INSTRUCTIONS
SPECIFIC INSTRUCTIONS BELOW     Metformin  mg  2 pills    Twice a  Day with food     Stay  on trulicity  1.5 mg  A week        Check blood sugars immediately before each meal and at bedtime       NPH    novolin    reli-on   insulin 60 units at bed time     Take repaglinide   2 mg right before b-fast ,  lunch  And dinner     CARB max is 30 gm per meal     Less than 70 mg NO INSULIN        Do not skip meals  Do not eat in between meals  Reduce carbs- pasta, rice, potatoes, bread   Do not drink juices or sodas  Do not eat peanut butter     Do not eat sugar free cookies and cakes   Do not eat grapes, ornages, pine apple and peaches , raisins          -------------PAY ATTENTION TO THESE GENERAL INSTRUCTIONS -----------------      - The medications prescribed at this visit will not be available at pharmacy until 6 pm       - YOUR MED LIST IS NOT UP TO DATE AS SOME CHANGES ARE BEING MADE AFTER THE VISIT - FOLLOW SPECIFIC INSTRUCTIONS  ABOVE     -ANY tests other than blood work, which you opt to do  outside the  [facility] imaging facilities, you are responsible for prior authorizations if  required    - [address] UP TO DATE ON YOUR AVS- PLEASE IGNORE     Results     *Normal results will not be notified by a phone call starting January 1 2021   *If you have an upcoming visit, the results will be discussed at the visit   *Please sign up for MY CHART if you want access to your lab and test results  *Abnormal results which require immediate attention will be notified by phone call   *Abnormal results which do not require immediate assistance will be notified in 1-2 weeks       Refills    -    have your pharmacy send us a refill request . Refills are done max for one year and a visit is a must before refills are extended    Follow up appointments -  highly encourage you to make it when you are checking out.  We can accommodate you into the schedule based on your clinical situation, but not for extending refills beyond a year. Labs are important to give refills and is important to get labs before the visit     Phone calls  -  Allow  24 hrs.  for non-urgent calls to be returned  Prior authorization - It may take 2-4 weeks to process  Forms  -  FMLA, DMV etc., will take up to 2 weeks to process  Cancellations - please notify the office 2 days in advance   Samples  - will only be dispensed at visits       If not showing for the appointments and cancelling appointments within 24 hours are kept track of and three  of such situations in  two consecutive years will likely be considered for termination from the practice    -------------------------------------------------------------------------------------------------------------------

## 2023-01-20 NOTE — PROGRESS NOTES
HISTORY OF PRESENT ILLNESS     Natan Fuller is a 77 y.o. male. HPI   Patient here for follow up for  Type 2 diabetes mellitus after his last visit in July 2022  Pt has CAD- s/p CABG;  ablation for atrial fib      Stable weight          July 2022     He stayed overnight at [address] [place] [address] for cardiac ablation on June 23 2022   LOST   20    lbs   Tolerating trulicity well       Feb 2022     A year of gap   Pt forgot to make follow up , had to get him In when refills are requested   He is diagnosed with neuropathy in the interim he says   He felt the flutter in his chest while driving , saw Dr. Ellie Begum and he is now going to wear the monitor   He had amoxycillin for dental work , which also helped his left leg cellulitis           Prior DM history-   H/o DM 2 for 11 yrs. Discontinued victoza sec to diarrhoea and higher co-pay. Started back on Byetta   Current diabetic medications include Metformin, glipizide and Byetta. Current monitoring regimen: home blood tests - 2 times daily   Home blood sugar records: fasting range: 140-10mg postprandial range: 130-160 mg   Post prandial more than 300 mg   Current exercise: no regular exercise   Known diabetic complications: peripheral neuropathy and cardiovascular diseases/p CABG       Review of Systems   Constitutional: Negative. Cardiovascular: Negative for chest pain, palpitations and leg swelling. Physical Exam   Constitutional: oriented to person, place, and time. appears well-developed and well-nourished.    Pulmonary/Chest: appears breathing effortlessly        Lab Results   Component Value Date/Time    Hemoglobin A1c 7.6 (H) 01/13/2023 10:52 AM    Hemoglobin A1c 8.1 (H) 07/08/2022 12:00 AM    Hemoglobin A1c 8.5 (H) 02/11/2022 12:00 AM    Hemoglobin A1c, External 6.2 12/17/2020 10:08 AM    Hemoglobin A1c, External 6.1 01/23/2020 12:00 AM    Glucose 102 (H) 01/13/2023 10:52 AM    Glucose  07/01/2017 09:13 AM    Microalbumin/Creat ratio (mg/g creat) 6 01/13/2023 10:52 AM    Microalbumin,urine random 1.03 01/13/2023 10:52 AM    LDL, calculated 63.4 01/13/2023 10:52 AM    Creatinine 1.31 (H) 01/13/2023 10:52 AM      Lab Results   Component Value Date/Time    Cholesterol, total 133 01/13/2023 10:52 AM    HDL Cholesterol 39 01/13/2023 10:52 AM    LDL, calculated 63.4 01/13/2023 10:52 AM    LDL-C, External 64 12/17/2020 10:08 AM    Triglyceride 153 (H) 01/13/2023 10:52 AM    CHOL/HDL Ratio 3.4 01/13/2023 10:52 AM     Lab Results   Component Value Date/Time    ALT (SGPT) 13 01/13/2023 10:52 AM    Alk. phosphatase 69 01/13/2023 10:52 AM    Bilirubin, total 0.4 01/13/2023 10:52 AM    Albumin 3.8 01/13/2023 10:52 AM    Protein, total 6.9 01/13/2023 10:52 AM     Lab Results   Component Value Date/Time    GFR est non-AA 61 02/11/2022 12:00 AM    GFR est AA 70 02/11/2022 12:00 AM    Creatinine 1.31 (H) 01/13/2023 10:52 AM    BUN 20 01/13/2023 10:52 AM    Sodium 138 01/13/2023 10:52 AM    Potassium 4.5 01/13/2023 10:52 AM    Chloride 101 01/13/2023 10:52 AM    CO2 28 01/13/2023 10:52 AM    Magnesium 2.2 08/24/2018 09:34 AM         ASSESSMENT and PLAN       1. Type 2 diabetes mellitus, uncontrolled.  A1C is  7.6%     from     jan 2023   compared to   8.1 %     From  July 2022   Compared to    8.5 %    From  Feb 2022   Compared to    6.2%     From dec 2020    Compared to 7.9 %      From   Sept 2020      comapred to    6.1 %  From jan 2020   Compared to    8.4 %  From   August 2019     compared to     7.8 %      From     April 2019     Compared to  8.6 %     From dec 2018      compared to   7.7 %      From     August 2018    Compared to   8.3 %     From may 2018            Jan 2023     Doing well on basal insulin,  repaglinide ,   trulicity  metfomrin   Discussed again jardiance  -  because of CV benefits   He is agreeable and I sent he script      July 15 2022     Improved control  Stay on basal bolus regimen    Increased  trulicity  1.5 mg a week   Discussed   SGL2 Decrease lantus to 60 units      2. Hypertension. Blood pressure is well controlled on lisinopril, HCTZ and carvedilol. 3. Dyslipidemia :  Lipids are great, Zocor 40 mg is being continued. 4. PAD : normal BRIDGER     5.  CAD/CABG : 2003-  afib- s/p cardioablation- on pradexa  And Tikosyn   F/u with dr. Ruperto Haro and Dr Jose Manuel Sanchez     6   Obesity : Body mass index is 36.51 kg/m². trulicity helps       7.   No retinopathy, no DM affection on kidneys ( microalb  negative )       Reviewed results with patient and discussed the labs being ordered today/bnv  Patient voiced understanding of plan of care

## 2023-01-20 NOTE — LETTER
1/20/2023    Patient: Rosa Kaur   YOB: 1945   Date of Visit: 1/20/2023     Rogerio Danielle MD  [address] [place] [id number]  Via Fax: [phone]    Dear Rogerio Danielle MD,      Thank you for referring Mr. Sterling Lopes to [address] [place] [address] for evaluation. My notes for this consultation are attached. If you have questions, please do not hesitate to call me. I look forward to following your patient along with you.       Sincerely,    Nicolette Jeffrey MD

## 2023-01-20 NOTE — PROGRESS NOTES
Emiliana Saenz is a 77 y.o. male here for   Chief Complaint   Patient presents with    Diabetes       1. Have you been to the ER or an urgent care clinic since your last visit?  - yes [facility] dx COVID     2. Have you been hospitalized since your last visit? - no    3. Have you seen or consulted any other health care providers outside of the [address] [place] [address] since your last visit?   Include any pap smears or colon screening.- yes Cardiologist -Dr. Haim Lora

## 2023-03-06 DIAGNOSIS — E11.65 UNCONTROLLED TYPE 2 DIABETES MELLITUS WITH HYPERGLYCEMIA (HCC): ICD-10-CM

## 2023-03-06 RX ORDER — CALCIUM CITRATE/VITAMIN D3 200MG-6.25
TABLET ORAL
Qty: 200 STRIP | Refills: 1 | Status: SHIPPED | OUTPATIENT
Start: 2023-03-06

## 2023-05-12 DIAGNOSIS — Z79.4 TYPE 2 DIABETES MELLITUS WITH HYPERGLYCEMIA, WITH LONG-TERM CURRENT USE OF INSULIN (HCC): Primary | ICD-10-CM

## 2023-05-12 DIAGNOSIS — E11.65 TYPE 2 DIABETES MELLITUS WITH HYPERGLYCEMIA, WITH LONG-TERM CURRENT USE OF INSULIN (HCC): Primary | ICD-10-CM

## 2023-06-23 ENCOUNTER — OFFICE VISIT (OUTPATIENT)
Age: 78
End: 2023-06-23
Payer: MEDICARE

## 2023-06-23 VITALS
DIASTOLIC BLOOD PRESSURE: 58 MMHG | OXYGEN SATURATION: 97 % | HEIGHT: 71 IN | SYSTOLIC BLOOD PRESSURE: 126 MMHG | WEIGHT: 252.6 LBS | TEMPERATURE: 97 F | HEART RATE: 69 BPM | RESPIRATION RATE: 20 BRPM | BODY MASS INDEX: 35.36 KG/M2

## 2023-06-23 DIAGNOSIS — I10 ESSENTIAL (PRIMARY) HYPERTENSION: ICD-10-CM

## 2023-06-23 DIAGNOSIS — Z79.4 LONG TERM (CURRENT) USE OF INSULIN (HCC): ICD-10-CM

## 2023-06-23 DIAGNOSIS — E11.65 TYPE 2 DIABETES MELLITUS WITH HYPERGLYCEMIA, WITH LONG-TERM CURRENT USE OF INSULIN (HCC): Primary | ICD-10-CM

## 2023-06-23 DIAGNOSIS — I48.11 LONGSTANDING PERSISTENT ATRIAL FIBRILLATION (HCC): ICD-10-CM

## 2023-06-23 DIAGNOSIS — E78.2 MIXED HYPERLIPIDEMIA: ICD-10-CM

## 2023-06-23 DIAGNOSIS — Z79.4 TYPE 2 DIABETES MELLITUS WITH HYPERGLYCEMIA, WITH LONG-TERM CURRENT USE OF INSULIN (HCC): Primary | ICD-10-CM

## 2023-06-23 PROCEDURE — 99214 OFFICE O/P EST MOD 30 MIN: CPT | Performed by: INTERNAL MEDICINE

## 2023-06-23 PROCEDURE — 3074F SYST BP LT 130 MM HG: CPT | Performed by: INTERNAL MEDICINE

## 2023-06-23 PROCEDURE — G8427 DOCREV CUR MEDS BY ELIG CLIN: HCPCS | Performed by: INTERNAL MEDICINE

## 2023-06-23 PROCEDURE — 1123F ACP DISCUSS/DSCN MKR DOCD: CPT | Performed by: INTERNAL MEDICINE

## 2023-06-23 PROCEDURE — 3051F HG A1C>EQUAL 7.0%<8.0%: CPT | Performed by: INTERNAL MEDICINE

## 2023-06-23 PROCEDURE — 3078F DIAST BP <80 MM HG: CPT | Performed by: INTERNAL MEDICINE

## 2023-06-23 PROCEDURE — 4004F PT TOBACCO SCREEN RCVD TLK: CPT | Performed by: INTERNAL MEDICINE

## 2023-06-23 PROCEDURE — G8417 CALC BMI ABV UP PARAM F/U: HCPCS | Performed by: INTERNAL MEDICINE

## 2023-06-23 RX ORDER — REPAGLINIDE 2 MG/1
TABLET ORAL
Qty: 270 TABLET | Refills: 3 | Status: SHIPPED | OUTPATIENT
Start: 2023-06-23

## 2023-06-23 RX ORDER — SIMVASTATIN 20 MG
20 TABLET ORAL NIGHTLY
COMMUNITY
Start: 2023-06-02

## 2023-06-23 RX ORDER — KETOCONAZOLE 20 MG/G
CREAM TOPICAL
COMMUNITY
Start: 2023-05-17

## 2023-06-23 RX ORDER — LANCETS
EACH MISCELLANEOUS
COMMUNITY
Start: 2023-06-16

## 2023-06-23 RX ORDER — MELOXICAM 15 MG/1
TABLET ORAL
COMMUNITY

## 2023-06-23 RX ORDER — METFORMIN HYDROCHLORIDE 500 MG/1
1000 TABLET, EXTENDED RELEASE ORAL 2 TIMES DAILY WITH MEALS
Qty: 360 TABLET | Refills: 3 | Status: SHIPPED | OUTPATIENT
Start: 2023-06-23

## 2023-06-23 RX ORDER — DULAGLUTIDE 1.5 MG/.5ML
INJECTION, SOLUTION SUBCUTANEOUS
Qty: 2 ML | Refills: 7 | Status: SHIPPED | OUTPATIENT
Start: 2023-06-23

## 2023-06-23 NOTE — PATIENT INSTRUCTIONS
SPECIFIC INSTRUCTIONS BELOW             -------------PAY ATTENTION TO THESE GENERAL INSTRUCTIONS -----------------      - The medications prescribed at this visit will not be available at pharmacy until 6 pm       - YOUR MED LIST IS NOT UP TO DATE AS SOME CHANGES ARE BEING MADE AFTER THE VISIT - FOLLOW SPECIFIC INSTRUCTIONS  ABOVE     -ANY tests other than blood work, which you opt to do  outside the  [facility] facilities, you are responsible for prior authorizations if  required    - [address] [facility]- PLEASE IGNORE     Results     *Normal results will not be notified by a phone call starting January 1 2021   *If you have an upcoming visit, the results will be discussed at the visit   *Please sign up for MY CHART if you want access to your lab and test results  *Abnormal results which require immediate attention will be notified by phone call   *Abnormal results which do not require immediate assistance will be notified in 1-2 weeks       Refills    -    have your pharmacy send us a refill request . Refills are done max for one year and a visit is a must before refills are extended    Follow up appointments -  highly encourage you to make it when you are checking out. We can accommodate you into the schedule based on your clinical situation, but not for extending refills beyond a year. Labs are important to give refills and is important to get labs before the visit     Phone calls  -  Allow  24 hrs.  for non-urgent calls to be returned  Prior authorization - It may take 2-4 weeks to process  Forms  -  FMLA, DMV etc., will take up to 2 weeks to process  Cancellations - please notify the office 2 days in advance   Samples  - will only be dispensed at visits       If not showing for the appointments and cancelling appointments within 24 hours are kept track of and three  of such situations in  two consecutive years will likely be considered for termination from the

## 2023-06-23 NOTE — PROGRESS NOTES
Flavia Cortez is a 77 y.o. male here for   Chief Complaint   Patient presents with    Diabetes     5 month follow up    Blood Work   All medication reviewed. Vitals:    06/23/23 1259   BP: (!) 126/58   Site: Right Upper Arm   Position: Sitting   Cuff Size: Large Adult   Pulse: 69   Resp: 20   Temp: 97 °F (36.1 °C)   TempSrc: Temporal   SpO2: 97%   Weight: 252 lb 9.6 oz (114.6 kg)   Height: 5' 11\" (1.803 m)        1. Have you been to the ER, urgent care clinic since your last visit? Hospitalized since your last visit? -Seen in the [address] ED chills, SOB. Patient reports having a UTI, CHF and AFIB. 2. Have you seen or consulted any other health care providers outside of the [address] [place] [address] since your last visit? Include any pap smears or colon screening.-Dr. Jerrod Recinos and Dr. Daria Malik-Cardiology.

## 2023-06-23 NOTE — PROGRESS NOTES
Mello Reddy MD FACE         HISTORY OF PRESENT ILLNESS     Jason Cabral is a 77 y.o. male. HPI   Patient here for follow up for  Type 2 diabetes mellitus after his last visit in Jan 2023    Lost 9 lbs     Denies any low sugars     C/o cost on  Trulicity ,   and  could be in  donut hole       Jan 2023     Pt has CAD- s/p CABG;  ablation for atrial fib    Stable weight        July 2022     He stayed overnight at [address] [place] [address] for cardiac ablation on June 23 2022   LOST   20    lbs   Tolerating trulicity well       Feb 2022     A year of gap   Pt forgot to make follow up , had to get him In when refills are requested   He is diagnosed with neuropathy in the interim he says   He felt the flutter in his chest while driving , saw Dr. Tamara Aguirre and he is now going to wear the monitor   He had amoxycillin for dental work , which also helped his left leg cellulitis           Prior DM history-   H/o DM 2 for 11 yrs. Discontinued victoza sec to diarrhoea and higher co-pay. Started back on Byetta   Current diabetic medications include Metformin, glipizide and Byetta. Current monitoring regimen: home blood tests - 2 times daily   Home blood sugar records: fasting range: 140-10mg postprandial range: 130-160 mg   Post prandial more than 300 mg   Current exercise: no regular exercise   Known diabetic complications: peripheral neuropathy and cardiovascular diseases/p CABG       Review of Systems   Constitutional: Negative. Cardiovascular: Negative for chest pain, palpitations and leg swelling. Physical Exam   Constitutional: oriented to person, place, and time. appears well-developed and well-nourished.    Pulmonary/Chest: appears breathing effortlessly        Diabetic feet exam :  June 2023     Left ankle  swollen than right ankle     H/o partial or complete amputation of foot : N  H/o previous foot ulceration ; N  H/o pre - ulcerative callus : Y  H/o peripheral

## 2023-07-13 DIAGNOSIS — Z79.4 LONG TERM (CURRENT) USE OF INSULIN (HCC): ICD-10-CM

## 2023-07-13 DIAGNOSIS — E11.65 TYPE 2 DIABETES MELLITUS WITH HYPERGLYCEMIA, WITH LONG-TERM CURRENT USE OF INSULIN (HCC): Primary | ICD-10-CM

## 2023-07-13 DIAGNOSIS — Z79.4 TYPE 2 DIABETES MELLITUS WITH HYPERGLYCEMIA, WITH LONG-TERM CURRENT USE OF INSULIN (HCC): Primary | ICD-10-CM

## 2023-07-13 RX ORDER — DULAGLUTIDE 1.5 MG/.5ML
INJECTION, SOLUTION SUBCUTANEOUS
Qty: 6 ML | Refills: 3 | Status: SHIPPED | OUTPATIENT
Start: 2023-07-13

## 2023-08-18 RX ORDER — CALCIUM CARB/VITAMIN D3/VIT K1 500-100-40
TABLET,CHEWABLE ORAL
Qty: 60 EACH | Refills: 5 | Status: SHIPPED | OUTPATIENT
Start: 2023-08-18

## 2023-11-15 DIAGNOSIS — E11.65 TYPE 2 DIABETES MELLITUS WITH HYPERGLYCEMIA, WITH LONG-TERM CURRENT USE OF INSULIN (HCC): Primary | ICD-10-CM

## 2023-11-15 DIAGNOSIS — Z79.4 TYPE 2 DIABETES MELLITUS WITH HYPERGLYCEMIA, WITH LONG-TERM CURRENT USE OF INSULIN (HCC): Primary | ICD-10-CM

## 2023-11-15 RX ORDER — CALCIUM CITRATE/VITAMIN D3 200MG-6.25
TABLET ORAL
Qty: 300 EACH | Refills: 3 | Status: SHIPPED | OUTPATIENT
Start: 2023-11-15

## 2023-11-16 ENCOUNTER — OFFICE VISIT (OUTPATIENT)
Age: 78
End: 2023-11-16

## 2023-11-16 VITALS
TEMPERATURE: 98.2 F | SYSTOLIC BLOOD PRESSURE: 118 MMHG | RESPIRATION RATE: 16 BRPM | WEIGHT: 244 LBS | BODY MASS INDEX: 34.16 KG/M2 | HEIGHT: 71 IN | DIASTOLIC BLOOD PRESSURE: 66 MMHG | HEART RATE: 76 BPM | OXYGEN SATURATION: 93 %

## 2023-11-16 DIAGNOSIS — L03.119 RECURRENT CELLULITIS OF LOWER EXTREMITY: Primary | ICD-10-CM

## 2023-11-16 PROBLEM — E11.9 DIABETES MELLITUS WITHOUT COMPLICATION (HCC): Status: ACTIVE | Noted: 2019-12-10

## 2023-11-16 PROBLEM — I48.91 ATRIAL FIBRILLATION (HCC): Status: ACTIVE | Noted: 2019-12-10

## 2023-11-16 PROBLEM — G47.33 OBSTRUCTIVE SLEEP APNEA OF ADULT: Status: ACTIVE | Noted: 2019-12-10

## 2023-11-16 PROBLEM — E78.5 DYSLIPIDEMIA: Status: ACTIVE | Noted: 2019-12-10

## 2023-11-16 RX ORDER — DIPHENOXYLATE HYDROCHLORIDE AND ATROPINE SULFATE 2.5; .025 MG/1; MG/1
1 TABLET ORAL 2 TIMES DAILY
COMMUNITY
Start: 2023-10-04

## 2023-11-29 ENCOUNTER — TELEPHONE (OUTPATIENT)
Age: 78
End: 2023-11-29

## 2023-11-29 NOTE — TELEPHONE ENCOUNTER
Script was sent to [facility] on nov 15th     May be there is a fax sheet somewhere     Look for it - or call pharmacy     Eric Cole MD

## 2023-11-29 NOTE — TELEPHONE ENCOUNTER
Patient is out of his test strips and [facility]ette states they have sent x2, not sure what they need but patient is in need of test strips.  Thank you

## 2023-11-30 NOTE — TELEPHONE ENCOUNTER
[facility] [address] [facility] Form needs to be faxed back. Brandon will refax. Advised patient of problem.

## 2023-12-06 ENCOUNTER — TELEPHONE (OUTPATIENT)
Age: 78
End: 2023-12-06

## 2023-12-06 NOTE — TELEPHONE ENCOUNTER
Pt states that you were going to discuss his Penicillian usage with Dr. Zheng.  However, he has not heard from you and is in need of a renewal of the Penicillian.

## 2023-12-07 DIAGNOSIS — L03.119 RECURRENT CELLULITIS OF LOWER EXTREMITY: Primary | ICD-10-CM

## 2023-12-08 DIAGNOSIS — L03.90 RECURRENT CELLULITIS: Primary | ICD-10-CM

## 2023-12-08 NOTE — TELEPHONE ENCOUNTER
Left voicemail for patient that prescription for Penicillin was called in to his pharmacy for prophylaxis against recurrent cellulitis.  Recommended follow-up in 6 months.

## 2023-12-12 RX ORDER — LANCETS
EACH MISCELLANEOUS
Qty: 300 EACH | Refills: 3 | Status: SHIPPED | OUTPATIENT
Start: 2023-12-12

## 2023-12-18 ENCOUNTER — TELEPHONE (OUTPATIENT)
Age: 78
End: 2023-12-18

## 2023-12-18 DIAGNOSIS — Z79.4 TYPE 2 DIABETES MELLITUS WITH HYPERGLYCEMIA, WITH LONG-TERM CURRENT USE OF INSULIN (HCC): ICD-10-CM

## 2023-12-18 DIAGNOSIS — E11.65 TYPE 2 DIABETES MELLITUS WITH HYPERGLYCEMIA, WITH LONG-TERM CURRENT USE OF INSULIN (HCC): ICD-10-CM

## 2023-12-18 RX ORDER — CALCIUM CITRATE/VITAMIN D3 200MG-6.25
TABLET ORAL
Qty: 300 EACH | Refills: 3 | Status: SHIPPED | OUTPATIENT
Start: 2023-12-18

## 2023-12-18 NOTE — TELEPHONE ENCOUNTER
Sadly , he has to call other pharmacies which are close to his house and find out which one carries these meds and I can e-scribe there     Mai Ferrera MD

## 2023-12-18 NOTE — TELEPHONE ENCOUNTER
[facility], patient's pharmacy, does not have Trulicity or Lancets for testing, not in stock, what should he do? He does not have internet access. Please call patient.

## 2023-12-20 ENCOUNTER — NURSE ONLY (OUTPATIENT)
Age: 78
End: 2023-12-20

## 2023-12-20 DIAGNOSIS — Z79.4 LONG TERM (CURRENT) USE OF INSULIN (HCC): ICD-10-CM

## 2023-12-20 DIAGNOSIS — Z79.4 TYPE 2 DIABETES MELLITUS WITH HYPERGLYCEMIA, WITH LONG-TERM CURRENT USE OF INSULIN (HCC): ICD-10-CM

## 2023-12-20 DIAGNOSIS — I10 ESSENTIAL (PRIMARY) HYPERTENSION: ICD-10-CM

## 2023-12-20 DIAGNOSIS — I48.11 LONGSTANDING PERSISTENT ATRIAL FIBRILLATION (HCC): ICD-10-CM

## 2023-12-20 DIAGNOSIS — E78.2 MIXED HYPERLIPIDEMIA: ICD-10-CM

## 2023-12-20 DIAGNOSIS — E11.65 TYPE 2 DIABETES MELLITUS WITH HYPERGLYCEMIA, WITH LONG-TERM CURRENT USE OF INSULIN (HCC): ICD-10-CM

## 2023-12-21 LAB
ALBUMIN SERPL-MCNC: 3.7 G/DL (ref 3.5–5)
ALBUMIN/GLOB SERPL: 1.2 (ref 1.1–2.2)
ALP SERPL-CCNC: 91 U/L (ref 45–117)
ALT SERPL-CCNC: 14 U/L (ref 12–78)
ANION GAP SERPL CALC-SCNC: 5 MMOL/L (ref 5–15)
AST SERPL-CCNC: 14 U/L (ref 15–37)
BILIRUB SERPL-MCNC: 0.4 MG/DL (ref 0.2–1)
BUN SERPL-MCNC: 28 MG/DL (ref 6–20)
BUN/CREAT SERPL: 22 (ref 12–20)
CALCIUM SERPL-MCNC: 8.2 MG/DL (ref 8.5–10.1)
CHLORIDE SERPL-SCNC: 106 MMOL/L (ref 97–108)
CHOLEST SERPL-MCNC: 136 MG/DL
CO2 SERPL-SCNC: 27 MMOL/L (ref 21–32)
CREAT SERPL-MCNC: 1.25 MG/DL (ref 0.7–1.3)
CREAT UR-MCNC: 101 MG/DL
EST. AVERAGE GLUCOSE BLD GHB EST-MCNC: 151 MG/DL
GLOBULIN SER CALC-MCNC: 3 G/DL (ref 2–4)
GLUCOSE SERPL-MCNC: 213 MG/DL (ref 65–100)
HBA1C MFR BLD: 6.9 % (ref 4–5.6)
HDLC SERPL-MCNC: 42 MG/DL
HDLC SERPL: 3.2 (ref 0–5)
LDLC SERPL CALC-MCNC: 50.8 MG/DL (ref 0–100)
MICROALBUMIN UR-MCNC: 1.04 MG/DL
MICROALBUMIN/CREAT UR-RTO: 10 MG/G (ref 0–30)
POTASSIUM SERPL-SCNC: 5 MMOL/L (ref 3.5–5.1)
PROT SERPL-MCNC: 6.7 G/DL (ref 6.4–8.2)
SODIUM SERPL-SCNC: 138 MMOL/L (ref 136–145)
TRIGL SERPL-MCNC: 216 MG/DL
VLDLC SERPL CALC-MCNC: 43.2 MG/DL

## 2024-02-27 ENCOUNTER — TELEPHONE (OUTPATIENT)
Age: 79
End: 2024-02-27

## 2024-02-27 DIAGNOSIS — Z79.4 TYPE 2 DIABETES MELLITUS WITH HYPERGLYCEMIA, WITH LONG-TERM CURRENT USE OF INSULIN (HCC): Primary | ICD-10-CM

## 2024-02-27 DIAGNOSIS — E11.65 TYPE 2 DIABETES MELLITUS WITH HYPERGLYCEMIA, WITH LONG-TERM CURRENT USE OF INSULIN (HCC): Primary | ICD-10-CM

## 2024-02-27 RX ORDER — TIRZEPATIDE 5 MG/.5ML
INJECTION, SOLUTION SUBCUTANEOUS
Qty: 6 ML | Refills: 1 | Status: SHIPPED | OUTPATIENT
Start: 2024-02-27

## 2024-02-27 NOTE — TELEPHONE ENCOUNTER
Patient cannot get Trulicity - he said he has tried several pharmacies and he has been without it for a month. He does not know what to do....please call patient.

## 2024-02-28 NOTE — TELEPHONE ENCOUNTER
Dr. Ferrera change to  mounjaro sent to [facility] on [address]  prior auth can take up to 2 weeks .

## 2024-03-12 ENCOUNTER — HOSPITAL ENCOUNTER (OUTPATIENT)
Facility: HOSPITAL | Age: 79
Discharge: HOME | End: 2024-03-15
Payer: MEDICARE

## 2024-03-12 ENCOUNTER — HOSPITAL ENCOUNTER (EMERGENCY)
Facility: HOSPITAL | Age: 79
Discharge: STILL ADMITTED OR ADMINISTRATIVE | End: 2024-03-12
Attending: EMERGENCY MEDICINE
Payer: MEDICARE

## 2024-03-12 ENCOUNTER — OFFICE VISIT (OUTPATIENT)
Age: 79
End: 2024-03-12
Payer: MEDICARE

## 2024-03-12 VITALS
WEIGHT: 247 LBS | HEIGHT: 74 IN | SYSTOLIC BLOOD PRESSURE: 99 MMHG | DIASTOLIC BLOOD PRESSURE: 60 MMHG | OXYGEN SATURATION: 92 % | BODY MASS INDEX: 31.7 KG/M2 | HEART RATE: 63 BPM

## 2024-03-12 DIAGNOSIS — M21.162 ACQUIRED GENU VARUM, LEFT: ICD-10-CM

## 2024-03-12 DIAGNOSIS — M25.562 CHRONIC PAIN OF LEFT KNEE: ICD-10-CM

## 2024-03-12 DIAGNOSIS — L03.116 CELLULITIS OF LEFT LOWER LEG: ICD-10-CM

## 2024-03-12 DIAGNOSIS — M17.12 PRIMARY OSTEOARTHRITIS OF LEFT KNEE: Primary | ICD-10-CM

## 2024-03-12 DIAGNOSIS — G89.29 CHRONIC PAIN OF LEFT KNEE: ICD-10-CM

## 2024-03-12 DIAGNOSIS — M17.12 PRIMARY OSTEOARTHRITIS OF LEFT KNEE: ICD-10-CM

## 2024-03-12 PROCEDURE — 99203 OFFICE O/P NEW LOW 30 MIN: CPT | Performed by: ORTHOPAEDIC SURGERY

## 2024-03-12 PROCEDURE — 73562 X-RAY EXAM OF KNEE 3: CPT

## 2024-03-12 PROCEDURE — 3074F SYST BP LT 130 MM HG: CPT | Performed by: ORTHOPAEDIC SURGERY

## 2024-03-12 PROCEDURE — 1123F ACP DISCUSS/DSCN MKR DOCD: CPT | Performed by: ORTHOPAEDIC SURGERY

## 2024-03-12 PROCEDURE — 3078F DIAST BP <80 MM HG: CPT | Performed by: ORTHOPAEDIC SURGERY

## 2024-03-12 ASSESSMENT — PATIENT HEALTH QUESTIONNAIRE - PHQ9
2. FEELING DOWN, DEPRESSED OR HOPELESS: 0
1. LITTLE INTEREST OR PLEASURE IN DOING THINGS: 0
SUM OF ALL RESPONSES TO PHQ QUESTIONS 1-9: 0
SUM OF ALL RESPONSES TO PHQ QUESTIONS 1-9: 0
SUM OF ALL RESPONSES TO PHQ9 QUESTIONS 1 & 2: 0
SUM OF ALL RESPONSES TO PHQ QUESTIONS 1-9: 0
SUM OF ALL RESPONSES TO PHQ QUESTIONS 1-9: 0

## 2024-03-12 NOTE — PROGRESS NOTES
Identified pt with two pt identifiers (name and ). Reviewed chart in preparation for visit and have obtained necessary documentation.    Donta Johnson is a 79 y.o. male  Chief Complaint   Patient presents with    New Patient    Knee Pain      Patient states that he has chronic kne pain that's been going on for many years. Patient states that he received X-rays at [facility].      BP 99/60 (Site: Right Upper Arm, Position: Sitting, Cuff Size: Medium Adult)   Pulse 63   Ht 1.88 m (6' 2\")   Wt 112 kg (247 lb)   SpO2 92%   BMI 31.71 kg/m²     1. Have you been to the ER, urgent care clinic since your last visit?  Hospitalized since your last visit?no    2. Have you seen or consulted any other health care providers outside of the [facility] since your last visit?  Include any pap smears or colon screening. no      
has normal left knee strength.    Tenderness   The patient is experiencing tenderness in the medial joint line and patella.    Range of Motion   Extension:  15   Flexion:  110     Tests   Lachman:  Anterior - negative      Drawer:  Anterior - negative       Patellar apprehension: positive    Other   Erythema: present  Scars: absent  Sensation: normal  Pulse: present  Swelling: moderate  Effusion: effusion present    Comments:  There is significant cellulitis and swelling left lower extremity with a small scab on an open wound from a traumatic episode.  There is no serous fluid weeping from any of these wounds.              Assessment:     1. Primary osteoarthritis of left knee    2. Acquired genu varum, left    3. Chronic pain of left knee    4. Cellulitis of left lower leg      There are no x-ray reports or studies to review today.    Plan:     X-rays of the left knee will be obtained and we will make an assessment of the degree of osteoarthritis present based on the x-ray.  He will follow-up after the x-ray is completed.    We briefly discussed another Kenalog injection, to be followed by hyaluronic acid derivative injections.  At the present time he is currently not a candidate for a left total knee replacement due to the open wound left lower extremity, and chronic cellulitis which may or may not improve with time.  He is on chronic Pen-Vee K antibiotics per Dr. Tolliver.    All questions were answered.    Cc: Dr. Germain

## 2024-04-10 ENCOUNTER — TELEPHONE (OUTPATIENT)
Age: 79
End: 2024-04-10

## 2024-04-10 ENCOUNTER — OFFICE VISIT (OUTPATIENT)
Age: 79
End: 2024-04-10

## 2024-04-10 VITALS
HEART RATE: 70 BPM | RESPIRATION RATE: 18 BRPM | OXYGEN SATURATION: 93 % | WEIGHT: 247 LBS | TEMPERATURE: 97 F | SYSTOLIC BLOOD PRESSURE: 106 MMHG | BODY MASS INDEX: 31.7 KG/M2 | DIASTOLIC BLOOD PRESSURE: 57 MMHG | HEIGHT: 74 IN

## 2024-04-10 DIAGNOSIS — G89.29 CHRONIC PAIN OF LEFT KNEE: ICD-10-CM

## 2024-04-10 DIAGNOSIS — L03.116 CELLULITIS OF LEFT LOWER LEG: ICD-10-CM

## 2024-04-10 DIAGNOSIS — M21.162 ACQUIRED GENU VARUM, LEFT: ICD-10-CM

## 2024-04-10 DIAGNOSIS — M17.12 PRIMARY OSTEOARTHRITIS OF LEFT KNEE: Primary | ICD-10-CM

## 2024-04-10 DIAGNOSIS — M25.562 CHRONIC PAIN OF LEFT KNEE: ICD-10-CM

## 2024-04-10 RX ORDER — TRIAMCINOLONE ACETONIDE 40 MG/ML
40 INJECTION, SUSPENSION INTRA-ARTICULAR; INTRAMUSCULAR ONCE
Status: COMPLETED | OUTPATIENT
Start: 2024-04-10 | End: 2024-04-10

## 2024-04-10 RX ADMIN — TRIAMCINOLONE ACETONIDE 40 MG: 40 INJECTION, SUSPENSION INTRA-ARTICULAR; INTRAMUSCULAR at 14:35

## 2024-04-10 ASSESSMENT — PATIENT HEALTH QUESTIONNAIRE - PHQ9
SUM OF ALL RESPONSES TO PHQ9 QUESTIONS 1 & 2: 0
2. FEELING DOWN, DEPRESSED OR HOPELESS: NOT AT ALL
SUM OF ALL RESPONSES TO PHQ QUESTIONS 1-9: 0
1. LITTLE INTEREST OR PLEASURE IN DOING THINGS: NOT AT ALL

## 2024-04-10 NOTE — PROGRESS NOTES
Identified pt with two pt identifiers (name and ). Reviewed chart in preparation for visit and have obtained necessary documentation.    Donta Johnson is a 79 y.o. male  Chief Complaint   Patient presents with    Follow-up     L knee pain        BP (!) 106/57 (Site: Right Upper Arm, Position: Sitting, Cuff Size: Medium Adult)   Pulse 70   Temp 97 °F (36.1 °C) (Oral)   Resp 18   Ht 1.88 m (6' 2\")   Wt 112 kg (247 lb)   SpO2 93%   BMI 31.71 kg/m²     1. Have you been to the ER, urgent care clinic since your last visit?  Hospitalized since your last visit?no    2. Have you seen or consulted any other health care providers outside of the [facility] since your last visit?  Include any pap smears or colon screening. no

## 2024-04-10 NOTE — PROGRESS NOTES
Subjective:      Patient ID: Donta Johnson is a 79 y.o.  male.    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  He recently had x-rays of the knee showing moderately severe degenerative osteoarthritis, especially in the medial compartment and patellofemoral joint.  He is here for a steroid injection.     HISTORY: Patient developed symptoms of left knee pain several years ago and had previously seen an orthopedic surgeon who referred him to chronic pain management.  He underwent at least one left knee injection which sounds like a steroid injection about 10 year sago.  He states that the injection did help.  Over the past 6 months he has had increasing degrees of pain and his primary care physician ordered an x-ray at another facility, the results we do not have.     The patient has had chronic intermittent cellulitis to the left lower extremity as result of his diabetes mellitus, and saphenous vein graft harvest to the LLE for a CABG performed about 10 years ago.  He has also had some mild traumatic episodes with nonhealing ulcers.  In this past November 2023, he was admitted for IV antibiotics and has been on preventative oral penicillin by Dr. Tolliver over the last several months.      Social History     Occupational History    Not on file   Tobacco Use    Smoking status: Never     Passive exposure: Never    Smokeless tobacco: Never   Vaping Use    Vaping Use: Never used   Substance and Sexual Activity    Alcohol use: No    Drug use: No    Sexual activity: Not on file      Review of Systems  Negative for any recent fevers or chills. Positive for left knee pain. Positive for chronic cellulitis left lower extremity with an open wound and mild drainage. Positive for swelling both lower extremities left greater than right.   Objective:   Ortho

## 2024-04-10 NOTE — TELEPHONE ENCOUNTER
Patient is needing to follow up in 3 weeks for Orthovisc gel injections following today's visit. Can you order the injections for this patient?

## 2024-04-19 ENCOUNTER — TELEPHONE (OUTPATIENT)
Age: 79
End: 2024-04-19

## 2024-04-19 NOTE — TELEPHONE ENCOUNTER
Patient is having low blood sugars last night 55 previous night 43 and has been having for a while. He would like to discuss further

## 2024-04-23 NOTE — TELEPHONE ENCOUNTER
Left a voicemail for the patient to call back and schedule his first orthovisc injection series. Please assist if he calls back and scheduled him for the week of May 8th of after.

## 2024-05-20 ENCOUNTER — TELEPHONE (OUTPATIENT)
Age: 79
End: 2024-05-20

## 2024-06-04 ENCOUNTER — OFFICE VISIT (OUTPATIENT)
Age: 79
End: 2024-06-04

## 2024-06-04 VITALS
RESPIRATION RATE: 18 BRPM | SYSTOLIC BLOOD PRESSURE: 106 MMHG | HEIGHT: 74 IN | TEMPERATURE: 97.5 F | HEART RATE: 80 BPM | BODY MASS INDEX: 31.7 KG/M2 | OXYGEN SATURATION: 93 % | DIASTOLIC BLOOD PRESSURE: 67 MMHG | WEIGHT: 247 LBS

## 2024-06-04 DIAGNOSIS — M25.562 CHRONIC PAIN OF LEFT KNEE: ICD-10-CM

## 2024-06-04 DIAGNOSIS — G89.29 CHRONIC PAIN OF LEFT KNEE: ICD-10-CM

## 2024-06-04 DIAGNOSIS — M21.162 ACQUIRED GENU VARUM, LEFT: ICD-10-CM

## 2024-06-04 DIAGNOSIS — M17.12 PRIMARY OSTEOARTHRITIS OF LEFT KNEE: Primary | ICD-10-CM

## 2024-06-04 ASSESSMENT — PATIENT HEALTH QUESTIONNAIRE - PHQ9
SUM OF ALL RESPONSES TO PHQ QUESTIONS 1-9: 0
SUM OF ALL RESPONSES TO PHQ9 QUESTIONS 1 & 2: 0
SUM OF ALL RESPONSES TO PHQ QUESTIONS 1-9: 0
1. LITTLE INTEREST OR PLEASURE IN DOING THINGS: NOT AT ALL
2. FEELING DOWN, DEPRESSED OR HOPELESS: NOT AT ALL
SUM OF ALL RESPONSES TO PHQ QUESTIONS 1-9: 0
SUM OF ALL RESPONSES TO PHQ QUESTIONS 1-9: 0

## 2024-06-04 NOTE — PROGRESS NOTES
Subjective:      Patient ID: Donta Johnson is a 79 y.o.  male.    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  At the time of the last office visit on 4/10/2024, he underwent an intra-articular Kenalog injection into the left knee, which helped significantly.  He is here to initiate Orthovisc injections x3.      He recently had x-rays of the knee showing moderately severe degenerative osteoarthritis, especially in the medial compartment and patellofemoral joint.      HISTORY: Patient developed symptoms of left knee pain several years ago and had previously seen an orthopedic surgeon who referred him to chronic pain management.  He underwent at least one left knee injection which sounds like a steroid injection about 10 year sago.  He states that the injection did help.  Over the past 6 months he has had increasing degrees of pain and his primary care physician ordered an x-ray at another facility, the results we do not have.     The patient has had chronic intermittent cellulitis to the left lower extremity as result of his diabetes mellitus, and saphenous vein graft harvest to the LLE for a CABG performed about 10 years ago.  He has also had some mild traumatic episodes with nonhealing ulcers.  In this past November 2023, he was admitted for IV antibiotics and has been on preventative oral penicillin by Dr. Tolliver over the last several months.  Social History     Occupational History    Not on file   Tobacco Use    Smoking status: Never     Passive exposure: Never    Smokeless tobacco: Never   Vaping Use    Vaping Use: Never used   Substance and Sexual Activity    Alcohol use: No    Drug use: No    Sexual activity: Not on file      Review of Systems  Negative for any recent fevers or chills. Positive for left knee pain. Positive

## 2024-06-04 NOTE — PROGRESS NOTES
Identified pt with two pt identifiers (name and ). Reviewed chart in preparation for visit and have obtained necessary documentation.    Donta Johnson is a 79 y.o. male  Chief Complaint   Patient presents with    Follow-up     L knee orthovisc inj 1rst      /67 (Site: Left Upper Arm, Position: Sitting, Cuff Size: Large Adult)   Pulse 80   Temp 97.5 °F (36.4 °C) (Oral)   Resp 18   Ht 1.88 m (6' 2\")   Wt 112 kg (247 lb)   SpO2 93%   BMI 31.71 kg/m²     1. Have you been to the ER, urgent care clinic since your last visit?  Hospitalized since your last visit?no    2. Have you seen or consulted any other health care providers outside of the [facility] since your last visit?  Include any pap smears or colon screening. no

## 2024-06-11 ENCOUNTER — OFFICE VISIT (OUTPATIENT)
Age: 79
End: 2024-06-11
Payer: MEDICARE

## 2024-06-11 VITALS
BODY MASS INDEX: 30.08 KG/M2 | DIASTOLIC BLOOD PRESSURE: 64 MMHG | HEART RATE: 75 BPM | HEIGHT: 74 IN | OXYGEN SATURATION: 96 % | WEIGHT: 234.35 LBS | TEMPERATURE: 98.1 F | SYSTOLIC BLOOD PRESSURE: 108 MMHG

## 2024-06-11 DIAGNOSIS — M25.562 CHRONIC PAIN OF LEFT KNEE: ICD-10-CM

## 2024-06-11 DIAGNOSIS — M17.12 PRIMARY OSTEOARTHRITIS OF LEFT KNEE: Primary | ICD-10-CM

## 2024-06-11 DIAGNOSIS — G89.29 CHRONIC PAIN OF LEFT KNEE: ICD-10-CM

## 2024-06-11 DIAGNOSIS — M21.162 ACQUIRED GENU VARUM, LEFT: ICD-10-CM

## 2024-06-11 DIAGNOSIS — L03.116 CELLULITIS OF LEFT LOWER LEG: ICD-10-CM

## 2024-06-11 PROCEDURE — 20610 DRAIN/INJ JOINT/BURSA W/O US: CPT | Performed by: ORTHOPAEDIC SURGERY

## 2024-06-11 PROCEDURE — 90000 NO LOS: CPT | Performed by: ORTHOPAEDIC SURGERY

## 2024-06-11 ASSESSMENT — PATIENT HEALTH QUESTIONNAIRE - PHQ9
SUM OF ALL RESPONSES TO PHQ QUESTIONS 1-9: 0
SUM OF ALL RESPONSES TO PHQ QUESTIONS 1-9: 0
2. FEELING DOWN, DEPRESSED OR HOPELESS: NOT AT ALL
SUM OF ALL RESPONSES TO PHQ QUESTIONS 1-9: 0
1. LITTLE INTEREST OR PLEASURE IN DOING THINGS: NOT AT ALL
SUM OF ALL RESPONSES TO PHQ QUESTIONS 1-9: 0
SUM OF ALL RESPONSES TO PHQ9 QUESTIONS 1 & 2: 0

## 2024-06-11 NOTE — PROGRESS NOTES
Identified pt with two pt identifiers (name and ). Reviewed chart in preparation for visit and have obtained necessary documentation.    Donta Johnson is a 79 y.o. male  Chief Complaint   Patient presents with    Knee Pain     Reason for Visit: 2nd orthovisc injection   Pain level: 3  Patient states no other concerns at this time         /64 (Site: Right Upper Arm, Position: Sitting, Cuff Size: Medium Adult)   Pulse 75   Temp 98.1 °F (36.7 °C) (Oral)   Ht 1.88 m (6' 2\")   Wt 106.3 kg (234 lb 5.6 oz)   SpO2 96%   BMI 30.09 kg/m²     1. Have you been to the ER, urgent care clinic since your last visit?  Hospitalized since your last visit?no    2. Have you seen or consulted any other health care providers outside of the [facility] since your last visit?  Include any pap smears or colon screening. no

## 2024-06-11 NOTE — PROGRESS NOTES
Subjective:      Patient ID: Donta Johnson is a 79 y.o.  male.    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  At the time of a prior office visit on 4/10/2024, he underwent an intra-articular Kenalog injection into the left knee, which helped significantly.  He is here for the 2nd Orthovisc injection.  First injection last week has produced some meaningful symptomatic relief without complications or swelling.      He recently had x-rays of the knee showing moderately severe degenerative osteoarthritis, especially in the medial compartment and patellofemoral joint.       HISTORY: Patient developed symptoms of left knee pain several years ago and had previously seen an orthopedic surgeon who referred him to chronic pain management.  He underwent at least one left knee injection which sounds like a steroid injection about 10 year sago.  He states that the injection did help.  Over the past 6 months he has had increasing degrees of pain and his primary care physician ordered an x-ray at another facility, the results we do not have.     The patient has had chronic intermittent cellulitis to the left lower extremity as result of his diabetes mellitus, and saphenous vein graft harvest to the LLE for a CABG performed about 10 years ago.  He has also had some mild traumatic episodes with nonhealing ulcers.  In this past November 2023, he was admitted for IV antibiotics and has been on preventative oral penicillin by Dr. Tolliver over the last several months.  Social History     Occupational History    Not on file   Tobacco Use    Smoking status: Never     Passive exposure: Never    Smokeless tobacco: Never   Vaping Use    Vaping Use: Never used   Substance

## 2024-06-17 ENCOUNTER — NURSE ONLY (OUTPATIENT)
Age: 79
End: 2024-06-17

## 2024-06-17 DIAGNOSIS — E11.65 TYPE 2 DIABETES MELLITUS WITH HYPERGLYCEMIA, WITH LONG-TERM CURRENT USE OF INSULIN (HCC): ICD-10-CM

## 2024-06-17 DIAGNOSIS — E78.2 MIXED HYPERLIPIDEMIA: ICD-10-CM

## 2024-06-17 DIAGNOSIS — E83.51 HYPOCALCEMIA: ICD-10-CM

## 2024-06-17 DIAGNOSIS — I48.11 LONGSTANDING PERSISTENT ATRIAL FIBRILLATION (HCC): ICD-10-CM

## 2024-06-17 DIAGNOSIS — E55.9 VITAMIN D DEFICIENCY: ICD-10-CM

## 2024-06-17 DIAGNOSIS — Z79.4 LONG TERM (CURRENT) USE OF INSULIN (HCC): ICD-10-CM

## 2024-06-17 DIAGNOSIS — Z79.4 TYPE 2 DIABETES MELLITUS WITH HYPERGLYCEMIA, WITH LONG-TERM CURRENT USE OF INSULIN (HCC): ICD-10-CM

## 2024-06-18 ENCOUNTER — OFFICE VISIT (OUTPATIENT)
Age: 79
End: 2024-06-18
Payer: MEDICARE

## 2024-06-18 VITALS
SYSTOLIC BLOOD PRESSURE: 99 MMHG | WEIGHT: 234 LBS | BODY MASS INDEX: 30.03 KG/M2 | HEIGHT: 74 IN | DIASTOLIC BLOOD PRESSURE: 60 MMHG | HEART RATE: 62 BPM | OXYGEN SATURATION: 90 % | TEMPERATURE: 97.8 F | RESPIRATION RATE: 18 BRPM

## 2024-06-18 DIAGNOSIS — M21.162 ACQUIRED GENU VARUM, LEFT: ICD-10-CM

## 2024-06-18 DIAGNOSIS — M17.12 PRIMARY OSTEOARTHRITIS OF LEFT KNEE: Primary | ICD-10-CM

## 2024-06-18 DIAGNOSIS — L03.116 CELLULITIS OF LEFT LOWER LEG: ICD-10-CM

## 2024-06-18 DIAGNOSIS — G89.29 CHRONIC PAIN OF LEFT KNEE: ICD-10-CM

## 2024-06-18 DIAGNOSIS — M25.562 CHRONIC PAIN OF LEFT KNEE: ICD-10-CM

## 2024-06-18 LAB
ALBUMIN SERPL-MCNC: 3.8 G/DL (ref 3.5–5)
ALBUMIN/GLOB SERPL: 1.5 (ref 1.1–2.2)
ALP SERPL-CCNC: 70 U/L (ref 45–117)
ALT SERPL-CCNC: 11 U/L (ref 12–78)
ANION GAP SERPL CALC-SCNC: 8 MMOL/L (ref 5–15)
AST SERPL-CCNC: 12 U/L (ref 15–37)
BILIRUB SERPL-MCNC: 0.5 MG/DL (ref 0.2–1)
BUN SERPL-MCNC: 23 MG/DL (ref 6–20)
BUN/CREAT SERPL: 19 (ref 12–20)
CALCIUM SERPL-MCNC: 9.1 MG/DL (ref 8.5–10.1)
CHLORIDE SERPL-SCNC: 100 MMOL/L (ref 97–108)
CHOLEST SERPL-MCNC: 137 MG/DL
CO2 SERPL-SCNC: 28 MMOL/L (ref 21–32)
CREAT SERPL-MCNC: 1.22 MG/DL (ref 0.7–1.3)
EST. AVERAGE GLUCOSE BLD GHB EST-MCNC: 140 MG/DL
GLOBULIN SER CALC-MCNC: 2.6 G/DL (ref 2–4)
GLUCOSE SERPL-MCNC: 232 MG/DL (ref 65–100)
HBA1C MFR BLD: 6.5 % (ref 4–5.6)
HDLC SERPL-MCNC: 43 MG/DL
HDLC SERPL: 3.2 (ref 0–5)
LDLC SERPL CALC-MCNC: 60.6 MG/DL (ref 0–100)
POTASSIUM SERPL-SCNC: 4.6 MMOL/L (ref 3.5–5.1)
PROT SERPL-MCNC: 6.4 G/DL (ref 6.4–8.2)
SODIUM SERPL-SCNC: 136 MMOL/L (ref 136–145)
TRIGL SERPL-MCNC: 167 MG/DL
VLDLC SERPL CALC-MCNC: 33.4 MG/DL

## 2024-06-18 PROCEDURE — 20610 DRAIN/INJ JOINT/BURSA W/O US: CPT | Performed by: ORTHOPAEDIC SURGERY

## 2024-06-18 PROCEDURE — 90000 NO LOS: CPT | Performed by: ORTHOPAEDIC SURGERY

## 2024-06-18 ASSESSMENT — PATIENT HEALTH QUESTIONNAIRE - PHQ9
SUM OF ALL RESPONSES TO PHQ QUESTIONS 1-9: 0
SUM OF ALL RESPONSES TO PHQ QUESTIONS 1-9: 0
SUM OF ALL RESPONSES TO PHQ9 QUESTIONS 1 & 2: 0
1. LITTLE INTEREST OR PLEASURE IN DOING THINGS: NOT AT ALL
SUM OF ALL RESPONSES TO PHQ QUESTIONS 1-9: 0
SUM OF ALL RESPONSES TO PHQ QUESTIONS 1-9: 0
2. FEELING DOWN, DEPRESSED OR HOPELESS: NOT AT ALL

## 2024-06-18 NOTE — PROGRESS NOTES
Identified pt with two pt identifiers (name and ). Reviewed chart in preparation for visit and have obtained necessary documentation.    Donta Johnson is a 79 y.o. male  Chief Complaint   Patient presents with    Follow-up     LT knee injection- 3rd series orthovisc     BP 99/60 (Site: Right Upper Arm, Position: Sitting, Cuff Size: Medium Adult)   Pulse 62   Temp 97.8 °F (36.6 °C) (Oral)   Resp 18   Ht 1.88 m (6' 2\")   Wt 106.1 kg (234 lb)   SpO2 90%   BMI 30.04 kg/m²     1. Have you been to the ER, urgent care clinic since your last visit?  Hospitalized since your last visit?no    2. Have you seen or consulted any other health care providers outside of the [facility] since your last visit?  Include any pap smears or colon screening. no  
right.   Objective:   Ortho Exam    Ortho Exam  Left Knee Exam      Muscle Strength   The patient has normal left knee strength.     Tenderness   The patient is experiencing tenderness in the medial joint line and patella.     Range of Motion   Extension:  15   Flexion:  110      Tests   Lachman:  Anterior - negative      Drawer:  Anterior - negative       Patellar apprehension: positive     Other   Erythema: present  Scars: absent  Sensation: normal  Pulse: present  Swelling: moderate  Effusion: effusion present     Comments:  There is significant cellulitis and swelling left lower extremity with a small scab on an open wound from a traumatic episode.  There is no serous fluid weeping from any of these wounds.  Assessment:     1. Primary osteoarthritis of left knee    2. Acquired genu varum, left    3. Chronic pain of left knee    4. Cellulitis of left lower leg          Plan:     After verbal and written consent was obtained, I marked the left knee as the appropriate injection site.  I prepped the left lateral knee with Betadine, chlorhexidine, and alcohol and injected 10 mL 1% lidocaine followed by 2 amount of Orthovisc.  No complications and no bleeding.  A Band-Aid was placed.    Patient may remove the Band-Aid in the next 2 to 3 hours.    He may follow-up in 4 months or so for reinjection of cortisone, to be followed by a second series of Orthovisc if needed.  All questions were answered.

## 2024-06-21 DIAGNOSIS — Z79.4 LONG TERM (CURRENT) USE OF INSULIN (HCC): ICD-10-CM

## 2024-06-21 DIAGNOSIS — E78.2 MIXED HYPERLIPIDEMIA: ICD-10-CM

## 2024-06-21 DIAGNOSIS — E11.65 TYPE 2 DIABETES MELLITUS WITH HYPERGLYCEMIA, WITH LONG-TERM CURRENT USE OF INSULIN (HCC): ICD-10-CM

## 2024-06-21 DIAGNOSIS — Z79.4 TYPE 2 DIABETES MELLITUS WITH HYPERGLYCEMIA, WITH LONG-TERM CURRENT USE OF INSULIN (HCC): ICD-10-CM

## 2024-06-21 DIAGNOSIS — E83.51 HYPOCALCEMIA: ICD-10-CM

## 2024-06-21 DIAGNOSIS — I48.11 LONGSTANDING PERSISTENT ATRIAL FIBRILLATION (HCC): ICD-10-CM

## 2024-06-21 DIAGNOSIS — E55.9 VITAMIN D DEFICIENCY: ICD-10-CM

## 2024-06-22 LAB
CREAT UR-MCNC: 18.3 MG/DL
MICROALBUMIN UR-MCNC: <0.5 MG/DL
MICROALBUMIN/CREAT UR-RTO: <27 MG/G (ref 0–30)

## 2024-06-24 ENCOUNTER — OFFICE VISIT (OUTPATIENT)
Age: 79
End: 2024-06-24
Payer: MEDICARE

## 2024-06-24 VITALS
HEIGHT: 74 IN | BODY MASS INDEX: 29.26 KG/M2 | HEART RATE: 74 BPM | WEIGHT: 228 LBS | TEMPERATURE: 97.7 F | SYSTOLIC BLOOD PRESSURE: 106 MMHG | OXYGEN SATURATION: 95 % | DIASTOLIC BLOOD PRESSURE: 49 MMHG

## 2024-06-24 DIAGNOSIS — E78.2 MIXED HYPERLIPIDEMIA: ICD-10-CM

## 2024-06-24 DIAGNOSIS — E11.65 TYPE 2 DIABETES MELLITUS WITH HYPERGLYCEMIA, WITH LONG-TERM CURRENT USE OF INSULIN (HCC): Primary | ICD-10-CM

## 2024-06-24 DIAGNOSIS — Z79.4 LONG TERM (CURRENT) USE OF INSULIN (HCC): ICD-10-CM

## 2024-06-24 DIAGNOSIS — I10 ESSENTIAL (PRIMARY) HYPERTENSION: ICD-10-CM

## 2024-06-24 DIAGNOSIS — E55.9 VITAMIN D DEFICIENCY: ICD-10-CM

## 2024-06-24 DIAGNOSIS — Z79.4 TYPE 2 DIABETES MELLITUS WITH HYPERGLYCEMIA, WITH LONG-TERM CURRENT USE OF INSULIN (HCC): Primary | ICD-10-CM

## 2024-06-24 PROCEDURE — 3044F HG A1C LEVEL LT 7.0%: CPT | Performed by: INTERNAL MEDICINE

## 2024-06-24 PROCEDURE — 3074F SYST BP LT 130 MM HG: CPT | Performed by: INTERNAL MEDICINE

## 2024-06-24 PROCEDURE — 1036F TOBACCO NON-USER: CPT | Performed by: INTERNAL MEDICINE

## 2024-06-24 PROCEDURE — G8417 CALC BMI ABV UP PARAM F/U: HCPCS | Performed by: INTERNAL MEDICINE

## 2024-06-24 PROCEDURE — 95251 CONT GLUC MNTR ANALYSIS I&R: CPT | Performed by: INTERNAL MEDICINE

## 2024-06-24 PROCEDURE — 1123F ACP DISCUSS/DSCN MKR DOCD: CPT | Performed by: INTERNAL MEDICINE

## 2024-06-24 PROCEDURE — G8427 DOCREV CUR MEDS BY ELIG CLIN: HCPCS | Performed by: INTERNAL MEDICINE

## 2024-06-24 PROCEDURE — 99214 OFFICE O/P EST MOD 30 MIN: CPT | Performed by: INTERNAL MEDICINE

## 2024-06-24 PROCEDURE — 3078F DIAST BP <80 MM HG: CPT | Performed by: INTERNAL MEDICINE

## 2024-06-24 RX ORDER — REPAGLINIDE 1 MG/1
TABLET ORAL
Qty: 270 TABLET | Refills: 3 | Status: SHIPPED | OUTPATIENT
Start: 2024-06-24

## 2024-06-24 NOTE — PROGRESS NOTES
I have reviewed all needed documentation in preparation for visit. Verified patient by name and date of birth  Donta Johnson is a 79 y.o. male Diabetes  .    Vitals:    06/24/24 1413 06/24/24 1417   BP: (!) 99/52 (!) 106/49   Pulse: 74    Temp: 97.7 °F (36.5 °C)    TempSrc: Tympanic    SpO2: 95%    Weight: 103.4 kg (228 lb)    Height: 1.88 m (6' 2\")        No LMP for male patient.         Health Maintenance Review: Patient reminded of \"due or due soon\" health maintenance. I have asked the patient to contact his/her primary care provider (PCP) for follow-up on his/her health maintenance.    \"Have you been to the ER, urgent care clinic since your last visit?  Hospitalized since your last visit?\"    NO    “Have you seen or consulted any other health care providers outside of [facility] since your last visit?”    NO      
neuropathy and callus : Y  H/o poor circulation     ADÁN   : N  Foot deformity : Y  , dystrophic nails , second  toe  on left  foot  deviated  to left         Labs      Lab Results   Component Value Date    LABA1C 6.5 (H) 06/17/2024    LABA1C 6.9 (H) 12/20/2023    LABA1C 7.2 (H) 06/16/2023       Lab Results   Component Value Date     06/17/2024    K 4.6 06/17/2024     06/17/2024    CO2 28 06/17/2024    BUN 23 (H) 06/17/2024    CREATININE 1.22 06/17/2024    GLUCOSE 232 (H) 06/17/2024    CALCIUM 9.1 06/17/2024    BILITOT 0.5 06/17/2024    ALKPHOS 70 06/17/2024    AST 12 (L) 06/17/2024    ALT 11 (L) 06/17/2024    LABGLOM 60 (L) 06/17/2024    GFRAA 70 02/11/2022    AGRATIO 1.2 01/13/2023    GLOB 2.6 06/17/2024    CHOL 137 06/17/2024    TRIG 167 (H) 06/17/2024    LDL 60.6 06/17/2024    HDL 43 06/17/2024        Lab Results   Component Value Date    MALBCR <27 06/21/2024         ASSESSMENT and PLAN       1. Type 2 diabetes mellitus, uncontrolled. A1C is  6.5 %      from    June 2024     compared to    6.9 %     from dec 2023    compared to   7.2 %    from    june 2023    compared to   7.6%     from     jan 2023   compared to   8.1 %     From  July 2022   Compared to    8.5 %    From  Feb 2022   Compared to    6.2%     From dec 2020    Compared to 7.9 %      From   Sept 2020      comapred to    6.1 %  From jan 2020 June 2024      Good DM  2 control   Decreasing  basal insulin,  and  repaglinide for low sugars     Optional   trulicity    or   Mounjaro  whichever is covered and available   Stay on   metfomrin   Never took   jardiance  -  because of CV benefits - could nto get it because of cost       Reviewed   DEXCOM clarity downloaded report for 14 days and discussed with pt    - dexcom - a1c is 7 % for 14 days   - 14 day average glucose 153   - 24% for high  4 %  very high    and    < 1 %  low sugars and % in Target  Range  71%    - percent of utiization 100  %  - CV% 29.7%                Dec

## 2024-06-24 NOTE — PATIENT INSTRUCTIONS
SPECIFIC INSTRUCTIONS BELOW     Metformin  mg  2 pills    Twice a  Day with food      trulicity  1.5 mg  A week    or      Mounjaro          Check blood sugars immediately before each meal         Decrease  NPH    novolin    reli-on   insulin to   20  units at bed time      decrease repaglinide   1  mg   t       right before b-fast ,  lunch  And dinner       Do not take it if you are not eating   Cut the pill to half if eating lesser than normal          CARB max is 30 gm per meal      Less than 70 mg NO INSULIN           Do not skip meals  Do not eat in between meals  Reduce carbs- pasta, rice, potatoes, bread   Do not drink juices or sodas  Do not eat peanut butter      Do not eat sugar free cookies and cakes   Do not eat grapes, ornages, pine apple and peaches , raisins        -------------PAY ATTENTION TO THESE GENERAL INSTRUCTIONS -----------------      - The medications prescribed at this visit will not be available at pharmacy until 6 pm       - YOUR MED LIST IS NOT UP TO DATE AS SOME CHANGES ARE BEING MADE AFTER THE VISIT - FOLLOW SPECIFIC INSTRUCTIONS  ABOVE     -ANY tests other than blood work, which you opt to do  outside the  [facility] imaging facilities, you are responsible for prior authorizations if  required    - HEALTH MAINTENANCE IS NOT GOING TO BE UP TO DATE ON YOUR AVS- PLEASE IGNORE     Results     *Normal results will not be notified by a phone call starting January 1 2021   *If you have an upcoming visit, the results will be discussed at the visit   *Please sign up for MY CHART if you want access to your lab and test results  *Abnormal results which require immediate attention will be notified by phone call   *Abnormal results which do not require immediate assistance will be notified in 1-2 weeks       Refills    -    have your pharmacy send us a refill request . Refills are done max for one year and a visit is a must before refills are extended    Follow up appointments -  highly

## 2024-06-27 ENCOUNTER — TELEPHONE (OUTPATIENT)
Age: 79
End: 2024-06-27

## 2024-06-27 DIAGNOSIS — L03.119 RECURRENT CELLULITIS OF LOWER EXTREMITY: Primary | ICD-10-CM

## 2024-06-27 RX ORDER — PENICILLIN V POTASSIUM 250 MG/1
250 TABLET ORAL 2 TIMES DAILY
Qty: 180 TABLET | Refills: 1 | Status: SHIPPED | OUTPATIENT
Start: 2024-06-27 | End: 2024-12-24

## 2024-06-27 NOTE — TELEPHONE ENCOUNTER
Patient needs penVK medication [facility] [address] [place]  Patient said he has to leave out today and would like to  today

## 2024-08-03 DIAGNOSIS — Z79.4 TYPE 2 DIABETES MELLITUS WITH HYPERGLYCEMIA, WITH LONG-TERM CURRENT USE OF INSULIN (HCC): Primary | ICD-10-CM

## 2024-08-03 DIAGNOSIS — E11.65 TYPE 2 DIABETES MELLITUS WITH HYPERGLYCEMIA, WITH LONG-TERM CURRENT USE OF INSULIN (HCC): Primary | ICD-10-CM

## 2024-08-05 RX ORDER — METFORMIN HYDROCHLORIDE 500 MG/1
1000 TABLET, EXTENDED RELEASE ORAL 2 TIMES DAILY WITH MEALS
Qty: 360 TABLET | Refills: 3 | Status: SHIPPED | OUTPATIENT
Start: 2024-08-05

## 2024-08-17 DIAGNOSIS — Z79.4 TYPE 2 DIABETES MELLITUS WITH HYPERGLYCEMIA, WITH LONG-TERM CURRENT USE OF INSULIN (HCC): Primary | ICD-10-CM

## 2024-08-17 DIAGNOSIS — E11.65 TYPE 2 DIABETES MELLITUS WITH HYPERGLYCEMIA, WITH LONG-TERM CURRENT USE OF INSULIN (HCC): Primary | ICD-10-CM

## 2024-08-19 RX ORDER — SYRINGE-NEEDLE,INSULIN,0.5 ML 27GX1/2"
SYRINGE, EMPTY DISPOSABLE MISCELLANEOUS
Qty: 100 EACH | Refills: 3 | Status: SHIPPED | OUTPATIENT
Start: 2024-08-19

## 2024-11-17 DIAGNOSIS — E78.2 MIXED HYPERLIPIDEMIA: ICD-10-CM

## 2024-11-17 DIAGNOSIS — E11.65 TYPE 2 DIABETES MELLITUS WITH HYPERGLYCEMIA, WITH LONG-TERM CURRENT USE OF INSULIN (HCC): Primary | ICD-10-CM

## 2024-11-17 DIAGNOSIS — Z79.4 TYPE 2 DIABETES MELLITUS WITH HYPERGLYCEMIA, WITH LONG-TERM CURRENT USE OF INSULIN (HCC): Primary | ICD-10-CM

## 2024-11-18 ENCOUNTER — LAB (OUTPATIENT)
Age: 79
End: 2024-11-18

## 2024-11-18 DIAGNOSIS — E78.2 MIXED HYPERLIPIDEMIA: ICD-10-CM

## 2024-11-18 DIAGNOSIS — E11.65 TYPE 2 DIABETES MELLITUS WITH HYPERGLYCEMIA, WITH LONG-TERM CURRENT USE OF INSULIN (HCC): ICD-10-CM

## 2024-11-18 DIAGNOSIS — Z79.4 TYPE 2 DIABETES MELLITUS WITH HYPERGLYCEMIA, WITH LONG-TERM CURRENT USE OF INSULIN (HCC): ICD-10-CM

## 2024-11-19 LAB
ALBUMIN SERPL-MCNC: 3.8 G/DL (ref 3.5–5)
ALBUMIN/GLOB SERPL: 1.5 (ref 1.1–2.2)
ALP SERPL-CCNC: 76 U/L (ref 45–117)
ALT SERPL-CCNC: 14 U/L (ref 12–78)
ANION GAP SERPL CALC-SCNC: 8 MMOL/L (ref 2–12)
AST SERPL-CCNC: 12 U/L (ref 15–37)
BILIRUB SERPL-MCNC: 0.5 MG/DL (ref 0.2–1)
BUN SERPL-MCNC: 30 MG/DL (ref 6–20)
BUN/CREAT SERPL: 29 (ref 12–20)
CALCIUM SERPL-MCNC: 9.2 MG/DL (ref 8.5–10.1)
CHLORIDE SERPL-SCNC: 101 MMOL/L (ref 97–108)
CHOLEST SERPL-MCNC: 130 MG/DL
CO2 SERPL-SCNC: 27 MMOL/L (ref 21–32)
CREAT SERPL-MCNC: 1.05 MG/DL (ref 0.7–1.3)
CREAT UR-MCNC: 82.9 MG/DL
EST. AVERAGE GLUCOSE BLD GHB EST-MCNC: 123 MG/DL
GLOBULIN SER CALC-MCNC: 2.6 G/DL (ref 2–4)
GLUCOSE SERPL-MCNC: 165 MG/DL (ref 65–100)
HBA1C MFR BLD: 5.9 % (ref 4–5.6)
HDLC SERPL-MCNC: 57 MG/DL
HDLC SERPL: 2.3 (ref 0–5)
LDLC SERPL CALC-MCNC: 56 MG/DL (ref 0–100)
MICROALBUMIN UR-MCNC: 0.77 MG/DL
MICROALBUMIN/CREAT UR-RTO: 9 MG/G (ref 0–30)
POTASSIUM SERPL-SCNC: 4.8 MMOL/L (ref 3.5–5.1)
PROT SERPL-MCNC: 6.4 G/DL (ref 6.4–8.2)
SODIUM SERPL-SCNC: 136 MMOL/L (ref 136–145)
TRIGL SERPL-MCNC: 85 MG/DL
VLDLC SERPL CALC-MCNC: 17 MG/DL

## 2024-12-02 ENCOUNTER — TELEPHONE (OUTPATIENT)
Age: 79
End: 2024-12-02

## 2024-12-02 DIAGNOSIS — E11.65 TYPE 2 DIABETES MELLITUS WITH HYPERGLYCEMIA, WITH LONG-TERM CURRENT USE OF INSULIN (HCC): ICD-10-CM

## 2024-12-02 DIAGNOSIS — Z79.4 TYPE 2 DIABETES MELLITUS WITH HYPERGLYCEMIA, WITH LONG-TERM CURRENT USE OF INSULIN (HCC): ICD-10-CM

## 2024-12-02 DIAGNOSIS — Z79.4 LONG TERM (CURRENT) USE OF INSULIN (HCC): ICD-10-CM

## 2024-12-02 DIAGNOSIS — I48.11 LONGSTANDING PERSISTENT ATRIAL FIBRILLATION (HCC): ICD-10-CM

## 2024-12-02 RX ORDER — ACYCLOVIR 400 MG/1
TABLET ORAL
Qty: 3 EACH | Refills: 11 | Status: SHIPPED | OUTPATIENT
Start: 2024-12-02 | End: 2024-12-02

## 2024-12-02 RX ORDER — ACYCLOVIR 400 MG/1
TABLET ORAL
Qty: 3 EACH | Refills: 11 | Status: CANCELLED | OUTPATIENT
Start: 2024-12-02

## 2024-12-02 RX ORDER — ACYCLOVIR 400 MG/1
TABLET ORAL
Qty: 9 EACH | Status: SHIPPED | OUTPATIENT
Start: 2024-12-02

## 2024-12-02 NOTE — TELEPHONE ENCOUNTER
Attempted to call. Unsuccessful. Left msg for Donta Johnson to give us a call back at the office. A callback number was left.

## 2024-12-02 NOTE — TELEPHONE ENCOUNTER
[facility]oke with pt and [facility] RX attached

## 2024-12-02 NOTE — TELEPHONE ENCOUNTER
Dexcom g7 sensors company advised patient he needs new authorization he is unclear if for refill approval or prior auth. Patient states he gets them sent directly from [employer] so unclear how that works with them.

## 2024-12-18 DIAGNOSIS — Z79.4 TYPE 2 DIABETES MELLITUS WITH HYPERGLYCEMIA, WITH LONG-TERM CURRENT USE OF INSULIN (HCC): Primary | ICD-10-CM

## 2024-12-18 DIAGNOSIS — E11.65 TYPE 2 DIABETES MELLITUS WITH HYPERGLYCEMIA, WITH LONG-TERM CURRENT USE OF INSULIN (HCC): Primary | ICD-10-CM

## 2024-12-18 RX ORDER — DULAGLUTIDE 1.5 MG/.5ML
1.5 INJECTION, SOLUTION SUBCUTANEOUS WEEKLY
Qty: 6 ML | Refills: 3 | Status: SHIPPED | OUTPATIENT
Start: 2024-12-18

## 2024-12-18 NOTE — TELEPHONE ENCOUNTER
Patient was called, I verified that patient is not taking Mounjaro. Patient stated he is currently taking Trulicity 1.5 mg weekly. Requesting refill be sent to [facility], [place].

## 2024-12-19 ENCOUNTER — OFFICE VISIT (OUTPATIENT)
Age: 79
End: 2024-12-19

## 2024-12-19 VITALS
SYSTOLIC BLOOD PRESSURE: 109 MMHG | TEMPERATURE: 97.1 F | OXYGEN SATURATION: 97 % | DIASTOLIC BLOOD PRESSURE: 64 MMHG | WEIGHT: 228 LBS | HEIGHT: 74 IN | HEART RATE: 60 BPM | BODY MASS INDEX: 29.26 KG/M2

## 2024-12-19 DIAGNOSIS — L03.116 CELLULITIS OF LEFT LOWER LEG: ICD-10-CM

## 2024-12-19 DIAGNOSIS — M17.12 PRIMARY OSTEOARTHRITIS OF LEFT KNEE: Primary | ICD-10-CM

## 2024-12-19 DIAGNOSIS — M21.162 ACQUIRED GENU VARUM, LEFT: ICD-10-CM

## 2024-12-19 DIAGNOSIS — G89.29 CHRONIC PAIN OF LEFT KNEE: ICD-10-CM

## 2024-12-19 DIAGNOSIS — M25.562 CHRONIC PAIN OF LEFT KNEE: ICD-10-CM

## 2024-12-19 RX ORDER — TRIAMCINOLONE ACETONIDE 40 MG/ML
40 INJECTION, SUSPENSION INTRA-ARTICULAR; INTRAMUSCULAR ONCE
Status: COMPLETED | OUTPATIENT
Start: 2024-12-19 | End: 2024-12-19

## 2024-12-19 RX ADMIN — TRIAMCINOLONE ACETONIDE 40 MG: 40 INJECTION, SUSPENSION INTRA-ARTICULAR; INTRAMUSCULAR at 15:45

## 2024-12-19 ASSESSMENT — PATIENT HEALTH QUESTIONNAIRE - PHQ9
2. FEELING DOWN, DEPRESSED OR HOPELESS: NOT AT ALL
SUM OF ALL RESPONSES TO PHQ QUESTIONS 1-9: 0
SUM OF ALL RESPONSES TO PHQ QUESTIONS 1-9: 0
1. LITTLE INTEREST OR PLEASURE IN DOING THINGS: NOT AT ALL
SUM OF ALL RESPONSES TO PHQ QUESTIONS 1-9: 0
SUM OF ALL RESPONSES TO PHQ9 QUESTIONS 1 & 2: 0
SUM OF ALL RESPONSES TO PHQ QUESTIONS 1-9: 0

## 2024-12-19 NOTE — PROGRESS NOTES
Identified pt with two pt identifiers (name and ). Reviewed chart in preparation for visit and have obtained necessary documentation.    Donta Johnson is a 79 y.o. male Follow-up (Left knee pain requesting a injection in it )  .    Vitals:    24 1500   BP: 109/64   Site: Left Upper Arm   Position: Sitting   Cuff Size: Large Adult   Pulse: 60   Temp: 97.1 °F (36.2 °C)   TempSrc: Temporal   SpO2: 97%   Weight: 103.4 kg (228 lb)   Height: 1.88 m (6' 2.02\")          1. Have you been to the ER, urgent care clinic since your last visit?  Hospitalized since your last visit?  no     2. Have you seen or consulted any other health care providers outside of the [facility] since your last visit?  Include any pap smears or colon screening.  no  
  Substance and Sexual Activity    Alcohol use: No    Drug use: No    Sexual activity: Not on file      Review of Systems  HEENT: Negative for any recent fevers or chills.     Cardiopulmonary: Denies any chest pain or shortness of breath.      Gastrointestinal: Denies any abdominal pain.      Musculoskeletal: Positive for left knee pain secondary to osteoarthritis.     Skin: Positive for chronic cellulitis left lower extremity with an open wound and mild drainage.     Peripheral vascular: Positive for swelling both lower extremities left greater than right.     Neurological: Denies any radiating pain into the foot or ankle or paresthesias or weakness.  Objective:   Ortho Exam  Left Knee Exam      Muscle Strength   The patient has normal left knee strength.     Tenderness   The patient is experiencing tenderness in the medial joint line and patella.     Range of Motion   Extension:  15   Flexion:  110      Tests   Lachman:  Anterior - negative      Drawer:  Anterior - negative       Patellar apprehension: positive     Other   Erythema: present  Scars: absent  Sensation: normal  Pulse: present  Swelling: moderate  Effusion: effusion present     Comments:  There is significant cellulitis and swelling left lower extremity with a small scab on an open wound from a traumatic episode.  There is no serous fluid weeping from any of these wounds.    Assessment:     1. Primary osteoarthritis of left knee    2. Acquired genu varum, left    3. Chronic pain of left knee    4. Cellulitis of left lower leg          Plan:     After verbal and written consent was obtained, I marked the left knee as the appropriate injection site.  I prepped the left lateral knee with Betadine, chlorhexidine, and alcohol and injected 10 mL 1% lidocaine followed by mixture of 1 mL Kenalog and 4 mL of 1% lidocaine.  No complications and no bleeding.  A Band-Aid was placed.    Should symptoms persist, the patient will follow-up in 1 to 2 months for a

## 2024-12-23 ENCOUNTER — OFFICE VISIT (OUTPATIENT)
Age: 79
End: 2024-12-23
Payer: MEDICARE

## 2024-12-23 VITALS
HEART RATE: 62 BPM | TEMPERATURE: 97.3 F | WEIGHT: 225 LBS | DIASTOLIC BLOOD PRESSURE: 60 MMHG | RESPIRATION RATE: 16 BRPM | SYSTOLIC BLOOD PRESSURE: 136 MMHG | HEIGHT: 74 IN | OXYGEN SATURATION: 94 % | BODY MASS INDEX: 28.88 KG/M2

## 2024-12-23 DIAGNOSIS — Z79.4 TYPE 2 DIABETES MELLITUS WITH HYPERGLYCEMIA, WITH LONG-TERM CURRENT USE OF INSULIN (HCC): Primary | ICD-10-CM

## 2024-12-23 DIAGNOSIS — D35.02: ICD-10-CM

## 2024-12-23 DIAGNOSIS — E11.65 TYPE 2 DIABETES MELLITUS WITH HYPERGLYCEMIA, WITH LONG-TERM CURRENT USE OF INSULIN (HCC): Primary | ICD-10-CM

## 2024-12-23 DIAGNOSIS — E55.9 VITAMIN D DEFICIENCY: ICD-10-CM

## 2024-12-23 DIAGNOSIS — E78.2 MIXED HYPERLIPIDEMIA: ICD-10-CM

## 2024-12-23 DIAGNOSIS — D35.01: ICD-10-CM

## 2024-12-23 DIAGNOSIS — Z79.4 LONG TERM (CURRENT) USE OF INSULIN (HCC): ICD-10-CM

## 2024-12-23 DIAGNOSIS — I48.11 LONGSTANDING PERSISTENT ATRIAL FIBRILLATION (HCC): ICD-10-CM

## 2024-12-23 PROCEDURE — 1160F RVW MEDS BY RX/DR IN RCRD: CPT | Performed by: INTERNAL MEDICINE

## 2024-12-23 PROCEDURE — 3044F HG A1C LEVEL LT 7.0%: CPT | Performed by: INTERNAL MEDICINE

## 2024-12-23 PROCEDURE — 3078F DIAST BP <80 MM HG: CPT | Performed by: INTERNAL MEDICINE

## 2024-12-23 PROCEDURE — 99214 OFFICE O/P EST MOD 30 MIN: CPT | Performed by: INTERNAL MEDICINE

## 2024-12-23 PROCEDURE — 1123F ACP DISCUSS/DSCN MKR DOCD: CPT | Performed by: INTERNAL MEDICINE

## 2024-12-23 PROCEDURE — 1126F AMNT PAIN NOTED NONE PRSNT: CPT | Performed by: INTERNAL MEDICINE

## 2024-12-23 PROCEDURE — 3074F SYST BP LT 130 MM HG: CPT | Performed by: INTERNAL MEDICINE

## 2024-12-23 PROCEDURE — G8417 CALC BMI ABV UP PARAM F/U: HCPCS | Performed by: INTERNAL MEDICINE

## 2024-12-23 PROCEDURE — G8484 FLU IMMUNIZE NO ADMIN: HCPCS | Performed by: INTERNAL MEDICINE

## 2024-12-23 PROCEDURE — 1159F MED LIST DOCD IN RCRD: CPT | Performed by: INTERNAL MEDICINE

## 2024-12-23 PROCEDURE — 95251 CONT GLUC MNTR ANALYSIS I&R: CPT | Performed by: INTERNAL MEDICINE

## 2024-12-23 PROCEDURE — 1036F TOBACCO NON-USER: CPT | Performed by: INTERNAL MEDICINE

## 2024-12-23 PROCEDURE — G8427 DOCREV CUR MEDS BY ELIG CLIN: HCPCS | Performed by: INTERNAL MEDICINE

## 2024-12-23 NOTE — PROGRESS NOTES
[facility]              Mai Ferrera MD FACE         HISTORY OF PRESENT ILLNESS     Donta Johnson is a 79    y.o. male.   HPI   Patient here for follow up for  Type 2 diabetes mellitus after his last visit in  June 2024   Pt has CAD- s/p CABG;  ablation for atrial fib  ;  cardiac ablation on June 23 2022         On dexcom    He expressed concern  over fasting highs   He is told to take lantus  in AM   for  lows in mid night  alarms       June 2024   He neither could get Trulicity   nor Mounjaro     He is c/o low sugars   He lost 20 lbs since last seen        Dec 2023     Pt struggled to get trulicity going   He had \" low episode \" 51 mg  in AM, called EMS       Feb 2022     A year of gap   Pt forgot to make follow up , had to get him In when refills are requested   He is diagnosed with neuropathy in the interim he says   He felt the flutter in his chest while driving , saw Dr. Costello and he is now going to wear the monitor   He had amoxycillin for dental work , which also helped his left leg cellulitis           Prior DM history-   H/o DM 2 for 11 yrs.   Discontinued victoza sec to diarrhoea and higher co-pay. Started back on Byetta   Current diabetic medications include Metformin, glipizide and Byetta.   Current monitoring regimen: home blood tests - 2 times daily   Home blood sugar records: fasting range: 140-10mg postprandial range: 130-160 mg   Post prandial more than 300 mg   Current exercise: no regular exercise   Known diabetic complications: peripheral neuropathy and cardiovascular diseases/p CABG       Review of Systems   Constitutional: Negative.    Cardiovascular: Negative for chest pain, palpitations and leg swelling.         Physical Exam   Constitutional: oriented to person, place, and time.  appears well-developed and well-nourished.   Pulmonary/Chest: appears breathing effortlessly        Diabetic feet exam :  June 2023     Left ankle  swollen than right ankle

## 2024-12-23 NOTE — PATIENT INSTRUCTIONS
SPECIFIC INSTRUCTIONS BELOW     1  mg  decadron suppression test  ( notified pt after the visit )      Metformin  mg  2 pills    Twice a  Day with food      trulicity  1.5 mg  A week       Check blood sugars immediately before each meal         Change   to    NPH    novolin    reli-on   insulin to  12    units  in  AM    and  12 units at bed time          repaglinide   1  mg     right before b-fast ,  lunch  And dinner       Do not take it if you are not eating   Cut the pill to half if eating lesser than normal          CARB max is 30 gm per meal      Less than 70 mg NO INSULIN           Do not skip meals  Do not eat in between meals  Reduce carbs- pasta, rice, potatoes, bread   Do not drink juices or sodas  Do not eat peanut butter      Do not eat sugar free cookies and cakes   Do not eat grapes, ornages, pine apple and peaches , raisins        -------------PAY ATTENTION TO THESE GENERAL INSTRUCTIONS -----------------      - The medications prescribed at this visit will not be available at pharmacy until 6 pm       - YOUR MED LIST IS NOT UP TO DATE AS SOME CHANGES ARE BEING MADE AFTER THE VISIT - FOLLOW SPECIFIC INSTRUCTIONS  ABOVE     -ANY tests other than blood work, which you opt to do  outside the  [facility] imaging facilities, you are responsible for prior authorizations if  required    - HEALTH MAINTENANCE IS NOT GOING TO BE UP TO DATE ON YOUR AVS- PLEASE IGNORE     Results     *Normal results will not be notified by a phone call starting January 1 2021   *If you have an upcoming visit, the results will be discussed at the visit   *Please sign up for MY CHART if you want access to your lab and test results  *Abnormal results which require immediate attention will be notified by phone call   *Abnormal results which do not require immediate assistance will be notified in 1-2 weeks       Refills    -    have your pharmacy send us a refill request . Refills are done max for one year and a visit is a

## 2024-12-23 NOTE — PROGRESS NOTES
Donta Johnson is a 79 y.o. male here for   Chief Complaint   Patient presents with    Diabetes       1. Have you been to the ER, urgent care clinic since your last visit?  Hospitalized since your last visit? -no    2. Have you seen or consulted any other health care providers outside of the [facility] since your last visit?  Include any pap smears or colon screening.- no

## 2024-12-24 RX ORDER — DEXAMETHASONE 1 MG
TABLET ORAL
Qty: 1 TABLET | Refills: 0 | Status: SHIPPED | OUTPATIENT
Start: 2024-12-24

## 2025-01-07 ENCOUNTER — OFFICE VISIT (OUTPATIENT)
Age: 80
End: 2025-01-07
Payer: MEDICARE

## 2025-01-07 VITALS
BODY MASS INDEX: 29.3 KG/M2 | HEIGHT: 74 IN | OXYGEN SATURATION: 96 % | WEIGHT: 228.3 LBS | HEART RATE: 68 BPM | RESPIRATION RATE: 16 BRPM | TEMPERATURE: 97.1 F | SYSTOLIC BLOOD PRESSURE: 133 MMHG | DIASTOLIC BLOOD PRESSURE: 69 MMHG

## 2025-01-07 DIAGNOSIS — L03.119 RECURRENT CELLULITIS OF LOWER EXTREMITY: Primary | ICD-10-CM

## 2025-01-07 PROCEDURE — 99214 OFFICE O/P EST MOD 30 MIN: CPT | Performed by: INTERNAL MEDICINE

## 2025-01-07 PROCEDURE — 1160F RVW MEDS BY RX/DR IN RCRD: CPT | Performed by: INTERNAL MEDICINE

## 2025-01-07 PROCEDURE — G8427 DOCREV CUR MEDS BY ELIG CLIN: HCPCS | Performed by: INTERNAL MEDICINE

## 2025-01-07 PROCEDURE — 1036F TOBACCO NON-USER: CPT | Performed by: INTERNAL MEDICINE

## 2025-01-07 PROCEDURE — 1159F MED LIST DOCD IN RCRD: CPT | Performed by: INTERNAL MEDICINE

## 2025-01-07 PROCEDURE — G8417 CALC BMI ABV UP PARAM F/U: HCPCS | Performed by: INTERNAL MEDICINE

## 2025-01-07 PROCEDURE — 1126F AMNT PAIN NOTED NONE PRSNT: CPT | Performed by: INTERNAL MEDICINE

## 2025-01-07 PROCEDURE — 1123F ACP DISCUSS/DSCN MKR DOCD: CPT | Performed by: INTERNAL MEDICINE

## 2025-01-07 PROCEDURE — 3078F DIAST BP <80 MM HG: CPT | Performed by: INTERNAL MEDICINE

## 2025-01-07 PROCEDURE — 3075F SYST BP GE 130 - 139MM HG: CPT | Performed by: INTERNAL MEDICINE

## 2025-01-07 RX ORDER — PENICILLIN V POTASSIUM 500 MG/1
250 TABLET, FILM COATED ORAL 2 TIMES DAILY
Qty: 360 TABLET | Refills: 0 | Status: SHIPPED | OUTPATIENT
Start: 2025-01-07 | End: 2026-01-02

## 2025-01-07 NOTE — PROGRESS NOTES
\"Have you been to the ER, urgent care clinic since your last visit?  Hospitalized since your last visit?\"    NO    “Have you seen or consulted any other health care providers outside our system since your last visit?”    YES - When: approximately 12/26/24 ago.  Where and Why: Dr. Phillips-Cardiology for follow up.      “Have you had a diabetic eye exam?”    YES - Where: [facility]     Date of last diabetic eye exam: 6/30/2014     Chief Complaint   Patient presents with    Follow-up     Cellulitis     /69 (Site: Left Upper Arm, Position: Sitting, Cuff Size: Large Adult)   Pulse 68   Temp 97.1 °F (36.2 °C) (Temporal)   Resp 16   Ht 1.88 m (6' 2\")   Wt 103.6 kg (228 lb 4.8 oz)   SpO2 96%   BMI 29.31 kg/m²

## 2025-01-08 DIAGNOSIS — E11.65 TYPE 2 DIABETES MELLITUS WITH HYPERGLYCEMIA, WITH LONG-TERM CURRENT USE OF INSULIN (HCC): ICD-10-CM

## 2025-01-08 DIAGNOSIS — Z79.4 TYPE 2 DIABETES MELLITUS WITH HYPERGLYCEMIA, WITH LONG-TERM CURRENT USE OF INSULIN (HCC): ICD-10-CM

## 2025-01-08 RX ORDER — DULAGLUTIDE 1.5 MG/.5ML
1.5 INJECTION, SOLUTION SUBCUTANEOUS WEEKLY
Qty: 6 ML | Refills: 3 | Status: SHIPPED | OUTPATIENT
Start: 2025-01-08

## 2025-01-08 NOTE — TELEPHONE ENCOUNTER
Called and spoke Mr. Johnson and verified that he does want to  his Trulicity from [facility] on [facility] as they have it in stock.

## 2025-01-09 ENCOUNTER — TELEPHONE (OUTPATIENT)
Age: 80
End: 2025-01-09

## 2025-01-09 NOTE — TELEPHONE ENCOUNTER
Spoke to caller and explained that his dose was not increased but that he is supposed to take 1/2 tab twice daily because it is a 500 mg tablet.  Contacted his Pharmacy and gave verbal order to change to 250 mg tablets so that he would not have to split them.

## 2025-01-09 NOTE — TELEPHONE ENCOUNTER
Patient called in       Wanted a call back for renewed prescription had questions for dr rocha or nurse     Call back is [phone]

## 2025-01-13 NOTE — PROGRESS NOTES
Subjective    Donta Johnson is a 79 y.o. male    HPI  Patient with history of CABG with saphenous venectomy over 10 years ago from his left leg with subsequent recurrent bouts of cellulitis involving his left leg usually associated with fever and chills with abrupt onset. He has been prophylactic PenVK for over 10 years, supervised by Dr. Pelayo for long time and reportedly with infrequent recurrences. He was then followed by Dr. Zheng and continued on PenVK.  He was seen here to continue prophylaxis and was continued on PenVK 250 mg po BID. He was recently hospitalized at [facility] for cellulitis with open wound on his left leg resulting from traumatic injury from car door. He received ID antibiotics  It is unclear what cultures showed or what antibiotics were given. He is here today for routine follow-up. Left leg wound has healed.      Review of Systems   Constitutional:  Negative for chills and fever.   Cardiovascular:  Positive for leg swelling.   Skin:  Negative for rash and wound (see HPI).   Allergic/Immunologic: Negative for immunocompromised state.   Hematological: Negative.          Past Medical History:   Diagnosis Date    Arthritis     CAD (coronary artery disease) of artery bypass graft CABG in jan 2003    dr. King Fung         Past Surgical History:   Procedure Laterality Date    CORONARY ARTERY BYPASS GRAFT          Vitals:    01/07/25 1615   BP: 133/69   Site: Left Upper Arm   Position: Sitting   Cuff Size: Large Adult   Pulse: 68   Resp: 16   Temp: 97.1 °F (36.2 °C)   TempSrc: Temporal   SpO2: 96%   Weight: 103.6 kg (228 lb 4.8 oz)   Height: 1.88 m (6' 2\")       Objective  Physical Exam  Vitals and nursing note reviewed.   Constitutional:       Appearance: Normal appearance.   Musculoskeletal:      Right lower leg: No edema.      Left lower leg: No edema.   Skin:     Findings: No erythema or rash.   Neurological:      General: No focal deficit present.      Mental Status:

## 2025-03-29 ENCOUNTER — APPOINTMENT (OUTPATIENT)
Facility: HOSPITAL | Age: 80
End: 2025-03-29
Payer: MEDICARE

## 2025-03-29 ENCOUNTER — HOSPITAL ENCOUNTER (EMERGENCY)
Facility: HOSPITAL | Age: 80
Discharge: HOME | End: 2025-03-29
Attending: STUDENT IN AN ORGANIZED HEALTH CARE EDUCATION/TRAINING PROGRAM
Payer: MEDICARE

## 2025-03-29 VITALS
BODY MASS INDEX: 28.36 KG/M2 | HEART RATE: 82 BPM | SYSTOLIC BLOOD PRESSURE: 135 MMHG | RESPIRATION RATE: 16 BRPM | WEIGHT: 221 LBS | DIASTOLIC BLOOD PRESSURE: 63 MMHG | TEMPERATURE: 98.2 F | HEIGHT: 74 IN | OXYGEN SATURATION: 100 %

## 2025-03-29 DIAGNOSIS — R19.7 DIARRHEA, UNSPECIFIED TYPE: Primary | ICD-10-CM

## 2025-03-29 LAB
ALBUMIN SERPL-MCNC: 3.7 G/DL (ref 3.5–5)
ALBUMIN/GLOB SERPL: 1.1 (ref 1.1–2.2)
ALP SERPL-CCNC: 75 U/L (ref 45–117)
ALT SERPL-CCNC: 18 U/L (ref 12–78)
ANION GAP SERPL CALC-SCNC: 3 MMOL/L (ref 2–12)
AST SERPL W P-5'-P-CCNC: 11 U/L (ref 15–37)
BASOPHILS # BLD: 0.05 K/UL (ref 0–0.1)
BASOPHILS NFR BLD: 0.6 % (ref 0–1)
BILIRUB SERPL-MCNC: 0.4 MG/DL (ref 0.2–1)
BUN SERPL-MCNC: 22 MG/DL (ref 6–20)
BUN/CREAT SERPL: 19 (ref 12–20)
C DIFF GDH STL QL: NEGATIVE
C DIFF TOX A+B STL QL IA: NEGATIVE
C DIFF TOXIN INTERPRETATION: NORMAL
CA-I BLD-MCNC: 9.5 MG/DL (ref 8.5–10.1)
CHLORIDE SERPL-SCNC: 108 MMOL/L (ref 97–108)
CO2 SERPL-SCNC: 28 MMOL/L (ref 21–32)
CREAT SERPL-MCNC: 1.15 MG/DL (ref 0.7–1.3)
DIFFERENTIAL METHOD BLD: ABNORMAL
EOSINOPHIL # BLD: 0.19 K/UL (ref 0–0.4)
EOSINOPHIL NFR BLD: 2.2 % (ref 0–7)
ERYTHROCYTE [DISTWIDTH] IN BLOOD BY AUTOMATED COUNT: 13 % (ref 11.5–14.5)
GLOBULIN SER CALC-MCNC: 3.4 G/DL (ref 2–4)
GLUCOSE BLD STRIP.AUTO-MCNC: 83 MG/DL (ref 65–100)
GLUCOSE SERPL-MCNC: 78 MG/DL (ref 65–100)
HCT VFR BLD AUTO: 35.8 % (ref 36.6–50.3)
HGB BLD-MCNC: 11.9 G/DL (ref 12.1–17)
IMM GRANULOCYTES # BLD AUTO: 0.02 K/UL (ref 0–0.04)
IMM GRANULOCYTES NFR BLD AUTO: 0.2 % (ref 0–0.5)
LYMPHOCYTES # BLD: 2 K/UL (ref 0.8–3.5)
LYMPHOCYTES NFR BLD: 23.3 % (ref 12–49)
MCH RBC QN AUTO: 31.7 PG (ref 26–34)
MCHC RBC AUTO-ENTMCNC: 33.2 G/DL (ref 30–36.5)
MCV RBC AUTO: 95.5 FL (ref 80–99)
MONOCYTES # BLD: 0.92 K/UL (ref 0–1)
MONOCYTES NFR BLD: 10.7 % (ref 5–13)
NEUTS SEG # BLD: 5.41 K/UL (ref 1.8–8)
NEUTS SEG NFR BLD: 63 % (ref 32–75)
NRBC # BLD: 0 K/UL (ref 0–0.01)
NRBC BLD-RTO: 0 PER 100 WBC
PERFORMED BY:: NORMAL
PLATELET # BLD AUTO: 284 K/UL (ref 150–400)
PMV BLD AUTO: 9.4 FL (ref 8.9–12.9)
POTASSIUM SERPL-SCNC: 3.6 MMOL/L (ref 3.5–5.1)
PROT SERPL-MCNC: 7.1 G/DL (ref 6.4–8.2)
RBC # BLD AUTO: 3.75 M/UL (ref 4.1–5.7)
SODIUM SERPL-SCNC: 139 MMOL/L (ref 136–145)
WBC # BLD AUTO: 8.6 K/UL (ref 4.1–11.1)

## 2025-03-29 PROCEDURE — 80053 COMPREHEN METABOLIC PANEL: CPT

## 2025-03-29 PROCEDURE — 82962 GLUCOSE BLOOD TEST: CPT

## 2025-03-29 PROCEDURE — 85025 COMPLETE CBC W/AUTO DIFF WBC: CPT

## 2025-03-29 PROCEDURE — 94761 N-INVAS EAR/PLS OXIMETRY MLT: CPT

## 2025-03-29 PROCEDURE — 36415 COLL VENOUS BLD VENIPUNCTURE: CPT

## 2025-03-29 PROCEDURE — 74177 CT ABD & PELVIS W/CONTRAST: CPT

## 2025-03-29 PROCEDURE — 87449 NOS EACH ORGANISM AG IA: CPT

## 2025-03-29 PROCEDURE — 99285 EMERGENCY DEPT VISIT HI MDM: CPT

## 2025-03-29 PROCEDURE — 87324 CLOSTRIDIUM AG IA: CPT

## 2025-03-29 PROCEDURE — 6360000004 HC RX CONTRAST MEDICATION: Performed by: STUDENT IN AN ORGANIZED HEALTH CARE EDUCATION/TRAINING PROGRAM

## 2025-03-29 RX ORDER — IOPAMIDOL 755 MG/ML
100 INJECTION, SOLUTION INTRAVASCULAR
Status: COMPLETED | OUTPATIENT
Start: 2025-03-29 | End: 2025-03-29

## 2025-03-29 RX ORDER — LOPERAMIDE HYDROCHLORIDE 2 MG/1
2 CAPSULE ORAL 4 TIMES DAILY PRN
Qty: 8 CAPSULE | Refills: 0 | Status: SHIPPED | OUTPATIENT
Start: 2025-03-29 | End: 2025-03-31

## 2025-03-29 RX ADMIN — IOPAMIDOL 100 ML: 755 INJECTION, SOLUTION INTRAVENOUS at 15:11

## 2025-03-29 ASSESSMENT — LIFESTYLE VARIABLES
HOW MANY STANDARD DRINKS CONTAINING ALCOHOL DO YOU HAVE ON A TYPICAL DAY: PATIENT DOES NOT DRINK
HOW OFTEN DO YOU HAVE A DRINK CONTAINING ALCOHOL: NEVER

## 2025-03-29 ASSESSMENT — PAIN - FUNCTIONAL ASSESSMENT: PAIN_FUNCTIONAL_ASSESSMENT: 0-10

## 2025-03-29 ASSESSMENT — PAIN SCALES - GENERAL: PAINLEVEL_OUTOF10: 3

## 2025-03-29 NOTE — DISCHARGE INSTRUCTIONS
Thank you for choosing our Emergency Department for your care.  It is our privilege to care for you in your time of need.  In the next several days, you may receive a survey via email or mailed to your home about your experience with our team.  We would greatly appreciate you taking a few minutes to complete the survey, as we use this information to learn what we have done well and what we could be doing better. Thank you for trusting us with your care!    Below you will find a list of your tests from today's visit.   Labs and Radiology Studies  Recent Results (from the past 12 hours)   POCT Glucose    Collection Time: 03/29/25 12:53 PM   Result Value Ref Range    POC Glucose 83 65 - 100 mg/dL    Performed by: Jr Gay    CBC with Auto Differential    Collection Time: 03/29/25  1:02 PM   Result Value Ref Range    WBC 8.6 4.1 - 11.1 K/uL    RBC 3.75 (L) 4.10 - 5.70 M/uL    Hemoglobin 11.9 (L) 12.1 - 17.0 g/dL    Hematocrit 35.8 (L) 36.6 - 50.3 %    MCV 95.5 80.0 - 99.0 FL    MCH 31.7 26.0 - 34.0 PG    MCHC 33.2 30.0 - 36.5 g/dL    RDW 13.0 11.5 - 14.5 %    Platelets 284 150 - 400 K/uL    MPV 9.4 8.9 - 12.9 FL    Nucleated RBCs 0.0 0.0  WBC    nRBC 0.00 0.00 - 0.01 K/uL    Neutrophils % 63.0 32.0 - 75.0 %    Lymphocytes % 23.3 12.0 - 49.0 %    Monocytes % 10.7 5.0 - 13.0 %    Eosinophils % 2.2 0.0 - 7.0 %    Basophils % 0.6 0.0 - 1.0 %    Immature Granulocytes % 0.2 0 - 0.5 %    Neutrophils Absolute 5.41 1.80 - 8.00 K/UL    Lymphocytes Absolute 2.00 0.80 - 3.50 K/UL    Monocytes Absolute 0.92 0.00 - 1.00 K/UL    Eosinophils Absolute 0.19 0.00 - 0.40 K/UL    Basophils Absolute 0.05 0.00 - 0.10 K/UL    Immature Granulocytes Absolute 0.02 0.00 - 0.04 K/UL    Differential Type AUTOMATED     Comprehensive Metabolic Panel    Collection Time: 03/29/25  1:02 PM   Result Value Ref Range    Sodium 139 136 - 145 mmol/L    Potassium 3.6 3.5 - 5.1 mmol/L    Chloride 108 97 - 108 mmol/L    CO2 28 21 - 32 mmol/L     lymphadenopathy or aortic aneurysm. REPRODUCTIVE ORGANS: Prostate enlarged URINARY BLADDER: No mass or calculus. BONES: No destructive bone lesion. Multilevel degenerative disc disease. ABDOMINAL WALL: Left inguinal fat-containing hernia.. ADDITIONAL COMMENTS: N/A     Rapid bowel transit/diarrhea, without bowel wall thickening Right basilar subsegmental atelectasis Incidental prostatomegaly and fat-containing left inguinal hernia and multilevel degenerative disc disease Electronically signed by Rissa Neumann    ------------------------------------------------------------------------------------------------------------  The evaluation and treatment you received in the Emergency Department were for an urgent problem. It is important that you follow-up with a doctor, nurse practitioner, or physician assistant to:  (1) confirm your diagnosis,  (2) re-evaluation of changes in your illness and treatment, and (3) for ongoing care. Please take your discharge instructions with you when you go to your follow-up appointment.     If you have any problem arranging a follow-up appointment, contact us!  If your symptoms become worse or you do not improve as expected, please return to us. We are available 24 hours a day.     If a prescription has been provided, please fill it as soon as possible to prevent a delay in treatment. If you have any questions or reservations about taking the medication due to side effects or interactions with other medications, please call your primary care provider or contact us directly.  Again, THANK YOU for choosing us to care for YOU!

## 2025-03-29 NOTE — ED PROVIDER NOTES
[facility] EMERGENCY DEPT  EMERGENCY DEPARTMENT HISTORY AND PHYSICAL EXAM      Date of evaluation: 3/29/2025  Patient Name: Donta Johnson  Birthdate 1945  MRN: [id number]  ED Provider: Adama Thomas MD   Note Started: 2:46 PM EDT 3/29/25    HISTORY OF PRESENT ILLNESS     Chief Complaint   Patient presents with    Diarrhea       History Provided By: Patient, only     HPI: Donta Johnson is a 80 y.o. male with past medical history as reviewed below presents for evaluation of diarrhea.  Patient states he has had diarrhea for the last week.  He has IBS, and has diarrhea frequently but does not usually last this long.  He has not been able to follow-up with his GI doctor.  He denies any blood in his stool.  Endorses generalized abdominal pain, especially when having bowel movements.    PAST MEDICAL HISTORY   Past Medical History:  Past Medical History:   Diagnosis Date    Arthritis     CAD (coronary artery disease) of artery bypass graft CABG in jan 2003    dr. King Fung        Past Surgical History:  Past Surgical History:   Procedure Laterality Date    CORONARY ARTERY BYPASS GRAFT         Family History:  No family history on file.    Social History:  Social History     Tobacco Use    Smoking status: Never     Passive exposure: Never    Smokeless tobacco: Never   Vaping Use    Vaping status: Never Used   Substance Use Topics    Alcohol use: No    Drug use: No       Allergies:  Allergies   Allergen Reactions    Sulfa Antibiotics      Other reaction(s): Unable to Obtain       PCP: Hernandez Germain MD    Current Meds:   No current facility-administered medications for this encounter.     Current Outpatient Medications   Medication Sig Dispense Refill    loperamide (IMODIUM) 2 MG capsule Take 1 capsule by mouth 4 times daily as needed for Diarrhea 8 capsule 0    dulaglutide (TRULICITY) 1.5 MG/0.5ML SC injection Inject 0.5 mLs into the skin once a week 6 mL 3    penicillin v potassium (VEETID) 500 MG  tablet Take 0.5 tablets by mouth in the morning and at bedtime 360 tablet 0    dexAMETHasone (DECADRON) 1 MG tablet Take 1 tab by mouth at 11 PM the night before AM labs. Have labs drawn between 7:30-8:30 am the next morning. (Patient not taking: Reported on 1/7/2025) 1 tablet 0    insulin NPH (HUMULIN N;NOVOLIN N) 100 UNIT/ML injection vial Change to   12  units  twice  a day 10 mL 5    Continuous Glucose Sensor (DEXCOM G7 SENSOR) MISC USE TO CHECK BG 4 TIMES DAILY. DX CODE: E11.65 9 each PRN    Insulin Syringe-Needle U-100 (BD INSULIN SYRINGE U/F) 31G X 5/16\" 1 ML MISC Inject insulin once daily. Dx code E11.65 100 each 3    metFORMIN (GLUCOPHAGE-XR) 500 MG extended release tablet TAKE 2 TABLETS BY MOUTH TWICE DAILY WITH MEALS 360 tablet 3    repaglinide (PRANDIN) 1 MG tablet Take one tab before meals 3 times a day    stop 2 mg dose pills 270 tablet 3    Continuous Blood Gluc  (DEXCOM G7 ) JEN Use to check BG 4 times daily. Dx code: E11.65 1 each 0    blood glucose test strips (TRUE METRIX BLOOD GLUCOSE TEST) strip USE TO CHECK BLOOD SUGAR three TIMEs DAILY 300 each 3    Accu-Chek Softclix Lancets MISC To check 3 times a day 300 each 3    diphenoxylate-atropine (LOMOTIL) 2.5-0.025 MG per tablet Take 1 tablet by mouth as needed.      simvastatin (ZOCOR) 20 MG tablet Take 1 tablet by mouth nightly at bedtime.      ketoconazole (NIZORAL) 2 % cream APPLY TOPICALLY TO FEET DAILY      carvedilol (COREG) 25 MG tablet Take 1 tablet by mouth daily      dabigatran (PRADAXA) 150 MG capsule Take by mouth every 12 hours      dofetilide (TIKOSYN) 250 MCG capsule Take 1 capsule by mouth daily      furosemide (LASIX) 80 MG tablet Take by mouth daily      lisinopril (PRINIVIL;ZESTRIL) 20 MG tablet Take 1 tablet by mouth daily      magnesium oxide (MAG-OX) 400 MG tablet Take 1 tablet by mouth daily      penicillin v potassium (VEETID) 250 MG tablet Take by mouth 2 times daily         Social Determinants of Health:

## 2025-03-29 NOTE — ED TRIAGE NOTES
Complaint of persistent diarrhea for >5 days. Pt reports it is uncontrollable. Pt denies N/V, denies appetite changes.

## 2025-06-17 ENCOUNTER — LAB (OUTPATIENT)
Age: 80
End: 2025-06-17

## 2025-06-17 DIAGNOSIS — I10 ESSENTIAL (PRIMARY) HYPERTENSION: ICD-10-CM

## 2025-06-17 DIAGNOSIS — Z79.4 LONG TERM (CURRENT) USE OF INSULIN (HCC): ICD-10-CM

## 2025-06-17 DIAGNOSIS — Z79.4 TYPE 2 DIABETES MELLITUS WITH HYPERGLYCEMIA, WITH LONG-TERM CURRENT USE OF INSULIN (HCC): ICD-10-CM

## 2025-06-17 DIAGNOSIS — Z79.4 TYPE 2 DIABETES MELLITUS WITH HYPERGLYCEMIA, WITH LONG-TERM CURRENT USE OF INSULIN (HCC): Primary | ICD-10-CM

## 2025-06-17 DIAGNOSIS — E11.65 TYPE 2 DIABETES MELLITUS WITH HYPERGLYCEMIA, WITH LONG-TERM CURRENT USE OF INSULIN (HCC): ICD-10-CM

## 2025-06-17 DIAGNOSIS — E78.2 MIXED HYPERLIPIDEMIA: ICD-10-CM

## 2025-06-17 DIAGNOSIS — E11.65 TYPE 2 DIABETES MELLITUS WITH HYPERGLYCEMIA, WITH LONG-TERM CURRENT USE OF INSULIN (HCC): Primary | ICD-10-CM

## 2025-06-18 LAB
ALBUMIN SERPL-MCNC: 3.7 G/DL (ref 3.5–5)
ALBUMIN/GLOB SERPL: 1.4 (ref 1.1–2.2)
ALP SERPL-CCNC: 69 U/L (ref 45–117)
ALT SERPL-CCNC: 13 U/L (ref 12–78)
ANION GAP SERPL CALC-SCNC: 10 MMOL/L (ref 2–12)
AST SERPL-CCNC: 12 U/L (ref 15–37)
BILIRUB SERPL-MCNC: 0.4 MG/DL (ref 0.2–1)
BUN SERPL-MCNC: 22 MG/DL (ref 6–20)
BUN/CREAT SERPL: 17 (ref 12–20)
CALCIUM SERPL-MCNC: 9 MG/DL (ref 8.5–10.1)
CHLORIDE SERPL-SCNC: 98 MMOL/L (ref 97–108)
CHOLEST SERPL-MCNC: 116 MG/DL
CO2 SERPL-SCNC: 25 MMOL/L (ref 21–32)
CREAT SERPL-MCNC: 1.31 MG/DL (ref 0.7–1.3)
CREAT UR-MCNC: 32.5 MG/DL
EST. AVERAGE GLUCOSE BLD GHB EST-MCNC: 134 MG/DL
GLOBULIN SER CALC-MCNC: 2.6 G/DL (ref 2–4)
GLUCOSE SERPL-MCNC: 163 MG/DL (ref 65–100)
HBA1C MFR BLD: 6.3 % (ref 4–5.6)
HDLC SERPL-MCNC: 52 MG/DL
HDLC SERPL: 2.2 (ref 0–5)
LDLC SERPL CALC-MCNC: 38.4 MG/DL (ref 0–100)
MICROALBUMIN UR-MCNC: <0.5 MG/DL
MICROALBUMIN/CREAT UR-RTO: <15 MG/G (ref 0–30)
POTASSIUM SERPL-SCNC: 3.7 MMOL/L (ref 3.5–5.1)
PROT SERPL-MCNC: 6.3 G/DL (ref 6.4–8.2)
SODIUM SERPL-SCNC: 133 MMOL/L (ref 136–145)
TRIGL SERPL-MCNC: 128 MG/DL
VLDLC SERPL CALC-MCNC: 25.6 MG/DL

## 2025-06-24 ENCOUNTER — OFFICE VISIT (OUTPATIENT)
Age: 80
End: 2025-06-24
Payer: MEDICARE

## 2025-06-24 VITALS
HEIGHT: 74 IN | OXYGEN SATURATION: 96 % | BODY MASS INDEX: 26.18 KG/M2 | DIASTOLIC BLOOD PRESSURE: 45 MMHG | TEMPERATURE: 98 F | HEART RATE: 85 BPM | SYSTOLIC BLOOD PRESSURE: 96 MMHG | WEIGHT: 204 LBS

## 2025-06-24 DIAGNOSIS — E87.1 HYPONATREMIA: Primary | ICD-10-CM

## 2025-06-24 DIAGNOSIS — Z79.4 TYPE 2 DIABETES MELLITUS WITH HYPERGLYCEMIA, WITH LONG-TERM CURRENT USE OF INSULIN (HCC): ICD-10-CM

## 2025-06-24 DIAGNOSIS — E78.2 MIXED HYPERLIPIDEMIA: ICD-10-CM

## 2025-06-24 DIAGNOSIS — I48.11 LONGSTANDING PERSISTENT ATRIAL FIBRILLATION (HCC): ICD-10-CM

## 2025-06-24 DIAGNOSIS — Z79.4 LONG TERM (CURRENT) USE OF INSULIN (HCC): ICD-10-CM

## 2025-06-24 DIAGNOSIS — I10 ESSENTIAL (PRIMARY) HYPERTENSION: ICD-10-CM

## 2025-06-24 DIAGNOSIS — E11.65 TYPE 2 DIABETES MELLITUS WITH HYPERGLYCEMIA, WITH LONG-TERM CURRENT USE OF INSULIN (HCC): ICD-10-CM

## 2025-06-24 PROCEDURE — 99215 OFFICE O/P EST HI 40 MIN: CPT | Performed by: INTERNAL MEDICINE

## 2025-06-24 RX ORDER — COLESTIPOL HYDROCHLORIDE 1 G/1
2 TABLET ORAL DAILY
COMMUNITY
Start: 2025-06-20

## 2025-06-24 RX ORDER — METOLAZONE 2.5 MG/1
2.5 TABLET ORAL DAILY
COMMUNITY

## 2025-06-24 NOTE — PROGRESS NOTES
[facility] DIABETES AND ENDOCRINOLOGY              Mai Ferrera MD FACE         HISTORY OF PRESENT ILLNESS     Donta Johnson is a 80    y.o. male.   HPI   Patient here for follow up for  Type 2 diabetes mellitus after his last visit in  Dec    2024   Pt has CAD- s/p CABG;  ablation for atrial fib  ;  cardiac ablation on June 23 2022       Pt is c/o severe watery diarrhea   Pt was always given  a diagnosis of D dominant IBS   He was given enzyme supplements  and  colestipol  from  new gastroenterologist  by Dr Zhang Louie     He lost significant weight  ( 20 lbs )     Pt  saw PCP  and he was told that there could be   3 incriminating  meds for diarrhea  ( prandin, trulicity and metformin)   First med, pt stopped is prandin. Pt says he seemed to be doing better soon after ,  but  back  to  where it was           Dec 2024   On dexcom    He expressed concern  over fasting highs   He is told to take lantus  in AM   for  lows in mid night  alarms     June 2024   He neither could get Trulicity   nor Mounjaro     He is c/o low sugars   He lost 20 lbs since last seen      Dec 2023   Pt struggled to get trulicity going   He had \" low episode \" 51 mg  in AM, called EMS       Feb 2022   A year of gap   Pt forgot to make follow up , had to get him In when refills are requested   He is diagnosed with neuropathy in the interim he says   He felt the flutter in his chest while driving , saw Dr. Costello and he is now going to wear the monitor   He had amoxycillin for dental work , which also helped his left leg cellulitis       Prior DM history-   H/o DM 2 for 11 yrs.   Discontinued victoza sec to diarrhoea and higher co-pay. Started back on Byetta   Current diabetic medications include Metformin, glipizide and Byetta.   Current monitoring regimen: home blood tests - 2 times daily   Home blood sugar records: fasting range: 140-10mg postprandial range: 130-160 mg   Post prandial more than 300 mg   Current exercise: no

## 2025-06-24 NOTE — PATIENT INSTRUCTIONS
SPECIFIC INSTRUCTIONS BELOW     Stop  Metformin ER  ,  Stop  trulicity   and  prandin  as a TRIAL      Check blood sugars immediately before each meal         LANTUS    insulin    OR       NPH    novolin    reli-on   insulin to  12    units  in  AM    and  12 units at bed time         Stop  repaglinide          Less than 70 mg NO INSULIN

## 2025-06-24 NOTE — PROGRESS NOTES
Donta Johnson is a 80 y.o. male here for   Chief Complaint   Patient presents with    Diabetes       1. Have you been to the ER, urgent care clinic since your last visit?  Hospitalized since your last visit? -  3/29/25- Diarrhea    2. Have you seen or consulted any other health care providers outside of the [facility] since your last visit?  Include any pap smears or colon screening.- Cardiology, PCP

## 2025-07-24 ENCOUNTER — ANESTHESIA (OUTPATIENT)
Facility: HOSPITAL | Age: 80
End: 2025-07-24
Payer: MEDICARE

## 2025-07-24 ENCOUNTER — ANESTHESIA EVENT (OUTPATIENT)
Facility: HOSPITAL | Age: 80
End: 2025-07-24
Payer: MEDICARE

## 2025-07-24 ENCOUNTER — HOSPITAL ENCOUNTER (OUTPATIENT)
Facility: HOSPITAL | Age: 80
Setting detail: OUTPATIENT SURGERY
Discharge: HOME | End: 2025-07-24
Attending: INTERNAL MEDICINE | Admitting: INTERNAL MEDICINE
Payer: MEDICARE

## 2025-07-24 VITALS
HEIGHT: 74 IN | OXYGEN SATURATION: 95 % | WEIGHT: 204 LBS | RESPIRATION RATE: 16 BRPM | TEMPERATURE: 98.5 F | HEART RATE: 87 BPM | BODY MASS INDEX: 26.18 KG/M2 | SYSTOLIC BLOOD PRESSURE: 138 MMHG | DIASTOLIC BLOOD PRESSURE: 68 MMHG

## 2025-07-24 DIAGNOSIS — R19.7 DIARRHEA, UNSPECIFIED TYPE: ICD-10-CM

## 2025-07-24 DIAGNOSIS — R19.5 ABNORMAL STOOLS: ICD-10-CM

## 2025-07-24 PROCEDURE — 7100000011 HC PHASE II RECOVERY - ADDTL 15 MIN: Performed by: INTERNAL MEDICINE

## 2025-07-24 PROCEDURE — 2709999900 HC NON-CHARGEABLE SUPPLY: Performed by: INTERNAL MEDICINE

## 2025-07-24 PROCEDURE — 3600007502: Performed by: INTERNAL MEDICINE

## 2025-07-24 PROCEDURE — 3600007512: Performed by: INTERNAL MEDICINE

## 2025-07-24 PROCEDURE — 88305 TISSUE EXAM BY PATHOLOGIST: CPT

## 2025-07-24 PROCEDURE — 7100000010 HC PHASE II RECOVERY - FIRST 15 MIN: Performed by: INTERNAL MEDICINE

## 2025-07-24 RX ORDER — SODIUM CHLORIDE 9 MG/ML
INJECTION, SOLUTION INTRAVENOUS CONTINUOUS
Status: DISCONTINUED | OUTPATIENT
Start: 2025-07-24 | End: 2025-07-24 | Stop reason: HOSPADM

## 2025-07-24 RX ORDER — SODIUM CHLORIDE, SODIUM LACTATE, POTASSIUM CHLORIDE, CALCIUM CHLORIDE 600; 310; 30; 20 MG/100ML; MG/100ML; MG/100ML; MG/100ML
INJECTION, SOLUTION INTRAVENOUS CONTINUOUS
Status: DISCONTINUED | OUTPATIENT
Start: 2025-07-24 | End: 2025-07-24 | Stop reason: HOSPADM

## 2025-07-24 ASSESSMENT — PAIN - FUNCTIONAL ASSESSMENT
PAIN_FUNCTIONAL_ASSESSMENT: 0-10

## 2025-07-24 NOTE — DISCHARGE INSTRUCTIONS
No sedation received today     -  Await pathology results.         -  Continue present medications.         -  No aspirin, ibuprofen, naproxen, or other non-steroidal            anti-inflammatory drugs.         -  Return to my office at appointment to be scheduled.

## 2025-08-14 DIAGNOSIS — E11.65 TYPE 2 DIABETES MELLITUS WITH HYPERGLYCEMIA, WITH LONG-TERM CURRENT USE OF INSULIN (HCC): ICD-10-CM

## 2025-08-14 DIAGNOSIS — Z79.4 TYPE 2 DIABETES MELLITUS WITH HYPERGLYCEMIA, WITH LONG-TERM CURRENT USE OF INSULIN (HCC): ICD-10-CM

## 2025-08-14 RX ORDER — CALCIUM CARB/VITAMIN D3/VIT K1 500-100-40
TABLET,CHEWABLE ORAL
Qty: 100 EACH | Refills: 3 | Status: SHIPPED | OUTPATIENT
Start: 2025-08-14

## 2025-08-31 ENCOUNTER — APPOINTMENT (OUTPATIENT)
Facility: HOSPITAL | Age: 80
End: 2025-08-31
Payer: MEDICARE

## 2025-08-31 ENCOUNTER — HOSPITAL ENCOUNTER (INPATIENT)
Facility: HOSPITAL | Age: 80
LOS: 6 days | Discharge: HOME HEALTH | End: 2025-09-06
Attending: FAMILY MEDICINE | Admitting: INTERNAL MEDICINE
Payer: MEDICARE

## 2025-08-31 DIAGNOSIS — J18.9 PNEUMONIA OF BOTH LOWER LOBES DUE TO INFECTIOUS ORGANISM: ICD-10-CM

## 2025-08-31 DIAGNOSIS — E86.0 DEHYDRATION: ICD-10-CM

## 2025-08-31 DIAGNOSIS — J96.01 SEPSIS WITH ACUTE HYPOXIC RESPIRATORY FAILURE AND SEPTIC SHOCK, DUE TO UNSPECIFIED ORGANISM (HCC): Primary | ICD-10-CM

## 2025-08-31 DIAGNOSIS — I24.9 ACUTE CORONARY SYNDROME (HCC): ICD-10-CM

## 2025-08-31 DIAGNOSIS — A41.9 SEPSIS WITH ACUTE HYPOXIC RESPIRATORY FAILURE AND SEPTIC SHOCK, DUE TO UNSPECIFIED ORGANISM (HCC): Primary | ICD-10-CM

## 2025-08-31 DIAGNOSIS — I21.4 NSTEMI (NON-ST ELEVATED MYOCARDIAL INFARCTION) (HCC): ICD-10-CM

## 2025-08-31 DIAGNOSIS — R65.21 SEPSIS WITH ACUTE HYPOXIC RESPIRATORY FAILURE AND SEPTIC SHOCK, DUE TO UNSPECIFIED ORGANISM (HCC): Primary | ICD-10-CM

## 2025-08-31 DIAGNOSIS — N28.9 RENAL INSUFFICIENCY: ICD-10-CM

## 2025-08-31 PROBLEM — R65.20 SEVERE SEPSIS (HCC): Status: ACTIVE | Noted: 2025-08-31

## 2025-08-31 LAB
ALBUMIN SERPL-MCNC: 3.6 G/DL (ref 3.5–5)
ALBUMIN/GLOB SERPL: 1.1 (ref 1.1–2.2)
ALP SERPL-CCNC: 79 U/L (ref 45–117)
ALT SERPL-CCNC: 12 U/L (ref 12–78)
ANION GAP SERPL CALC-SCNC: 10 MMOL/L (ref 2–12)
AST SERPL W P-5'-P-CCNC: 14 U/L (ref 15–37)
BASOPHILS # BLD: 0.05 K/UL (ref 0–0.1)
BASOPHILS NFR BLD: 0.3 % (ref 0–1)
BILIRUB SERPL-MCNC: 0.3 MG/DL (ref 0.2–1)
BNP SERPL-MCNC: 2244 PG/ML
BUN SERPL-MCNC: 36 MG/DL (ref 6–20)
BUN/CREAT SERPL: 24 (ref 12–20)
CA-I BLD-MCNC: 8.5 MG/DL (ref 8.5–10.1)
CHLORIDE SERPL-SCNC: 104 MMOL/L (ref 97–108)
CO2 SERPL-SCNC: 28 MMOL/L (ref 21–32)
CREAT SERPL-MCNC: 1.49 MG/DL (ref 0.7–1.3)
DIFFERENTIAL METHOD BLD: ABNORMAL
EOSINOPHIL # BLD: 0.42 K/UL (ref 0–0.4)
EOSINOPHIL NFR BLD: 2.3 % (ref 0–7)
ERYTHROCYTE [DISTWIDTH] IN BLOOD BY AUTOMATED COUNT: 13.1 % (ref 11.5–14.5)
FLUAV RNA SPEC QL NAA+PROBE: NOT DETECTED
FLUBV RNA SPEC QL NAA+PROBE: NOT DETECTED
GLOBULIN SER CALC-MCNC: 3.3 G/DL (ref 2–4)
GLUCOSE BLD STRIP.AUTO-MCNC: 160 MG/DL (ref 65–100)
GLUCOSE BLD STRIP.AUTO-MCNC: 188 MG/DL (ref 65–100)
GLUCOSE BLD STRIP.AUTO-MCNC: 201 MG/DL (ref 65–100)
GLUCOSE BLD STRIP.AUTO-MCNC: 204 MG/DL (ref 65–100)
GLUCOSE SERPL-MCNC: 173 MG/DL (ref 65–100)
HCT VFR BLD AUTO: 36 % (ref 36.6–50.3)
HGB BLD-MCNC: 11.3 G/DL (ref 12.1–17)
IMM GRANULOCYTES # BLD AUTO: 0.03 K/UL (ref 0–0.04)
IMM GRANULOCYTES NFR BLD AUTO: 0.2 % (ref 0–0.5)
LACTATE BLD-SCNC: 1.2 MMOL/L (ref 0.4–2)
LACTATE BLD-SCNC: 2.64 MMOL/L (ref 0.4–2)
LYMPHOCYTES # BLD: 1.37 K/UL (ref 0.8–3.5)
LYMPHOCYTES NFR BLD: 7.6 % (ref 12–49)
MCH RBC QN AUTO: 31.9 PG (ref 26–34)
MCHC RBC AUTO-ENTMCNC: 31.4 G/DL (ref 30–36.5)
MCV RBC AUTO: 101.7 FL (ref 80–99)
MONOCYTES # BLD: 0.77 K/UL (ref 0–1)
MONOCYTES NFR BLD: 4.3 % (ref 5–13)
MRSA DNA SPEC QL NAA+PROBE: NOT DETECTED
NEUTS SEG # BLD: 15.31 K/UL (ref 1.8–8)
NEUTS SEG NFR BLD: 85.3 % (ref 32–75)
PERFORMED BY:: ABNORMAL
PERFORMED BY:: NORMAL
PLATELET # BLD AUTO: 243 K/UL (ref 150–400)
PMV BLD AUTO: 9.9 FL (ref 8.9–12.9)
POTASSIUM SERPL-SCNC: 4.7 MMOL/L (ref 3.5–5.1)
PROCALCITONIN SERPL-MCNC: <0.05 NG/ML
PROT SERPL-MCNC: 6.9 G/DL (ref 6.4–8.2)
RBC # BLD AUTO: 3.54 M/UL (ref 4.1–5.7)
SARS-COV-2 RNA RESP QL NAA+PROBE: NOT DETECTED
SODIUM SERPL-SCNC: 142 MMOL/L (ref 136–145)
TROPONIN I SERPL HS-MCNC: 1186 NG/L (ref 0–76)
TROPONIN I SERPL HS-MCNC: 1290 NG/L (ref 0–76)
TROPONIN I SERPL HS-MCNC: 1664 NG/L (ref 0–76)
TROPONIN I SERPL HS-MCNC: 1784 NG/L (ref 0–76)
TROPONIN I SERPL HS-MCNC: 2229 NG/L (ref 0–76)
TROPONIN I SERPL HS-MCNC: 34 NG/L (ref 0–76)
WBC # BLD AUTO: 18 K/UL (ref 4.1–11.1)

## 2025-08-31 PROCEDURE — 84484 ASSAY OF TROPONIN QUANT: CPT

## 2025-08-31 PROCEDURE — 87040 BLOOD CULTURE FOR BACTERIA: CPT

## 2025-08-31 PROCEDURE — 6370000000 HC RX 637 (ALT 250 FOR IP): Performed by: NURSE PRACTITIONER

## 2025-08-31 PROCEDURE — 87899 AGENT NOS ASSAY W/OPTIC: CPT

## 2025-08-31 PROCEDURE — 87077 CULTURE AEROBIC IDENTIFY: CPT

## 2025-08-31 PROCEDURE — 83605 ASSAY OF LACTIC ACID: CPT

## 2025-08-31 PROCEDURE — 93005 ELECTROCARDIOGRAM TRACING: CPT | Performed by: FAMILY MEDICINE

## 2025-08-31 PROCEDURE — 36415 COLL VENOUS BLD VENIPUNCTURE: CPT

## 2025-08-31 PROCEDURE — 2500000003 HC RX 250 WO HCPCS: Performed by: FAMILY MEDICINE

## 2025-08-31 PROCEDURE — 87641 MR-STAPH DNA AMP PROBE: CPT

## 2025-08-31 PROCEDURE — 2500000003 HC RX 250 WO HCPCS: Performed by: INTERNAL MEDICINE

## 2025-08-31 PROCEDURE — 87154 CUL TYP ID BLD PTHGN 6+ TRGT: CPT

## 2025-08-31 PROCEDURE — 2580000003 HC RX 258: Performed by: FAMILY MEDICINE

## 2025-08-31 PROCEDURE — 96374 THER/PROPH/DIAG INJ IV PUSH: CPT

## 2025-08-31 PROCEDURE — 82962 GLUCOSE BLOOD TEST: CPT

## 2025-08-31 PROCEDURE — 87186 SC STD MICRODIL/AGAR DIL: CPT

## 2025-08-31 PROCEDURE — 85025 COMPLETE CBC W/AUTO DIFF WBC: CPT

## 2025-08-31 PROCEDURE — 84145 PROCALCITONIN (PCT): CPT

## 2025-08-31 PROCEDURE — 6360000002 HC RX W HCPCS: Performed by: INTERNAL MEDICINE

## 2025-08-31 PROCEDURE — 1100000000 HC RM PRIVATE

## 2025-08-31 PROCEDURE — 71045 X-RAY EXAM CHEST 1 VIEW: CPT

## 2025-08-31 PROCEDURE — 6370000000 HC RX 637 (ALT 250 FOR IP): Performed by: INTERNAL MEDICINE

## 2025-08-31 PROCEDURE — 6360000002 HC RX W HCPCS: Performed by: FAMILY MEDICINE

## 2025-08-31 PROCEDURE — 80053 COMPREHEN METABOLIC PANEL: CPT

## 2025-08-31 PROCEDURE — 99285 EMERGENCY DEPT VISIT HI MDM: CPT

## 2025-08-31 PROCEDURE — 87636 SARSCOV2 & INF A&B AMP PRB: CPT

## 2025-08-31 PROCEDURE — 93005 ELECTROCARDIOGRAM TRACING: CPT | Performed by: INTERNAL MEDICINE

## 2025-08-31 PROCEDURE — 83880 ASSAY OF NATRIURETIC PEPTIDE: CPT

## 2025-08-31 RX ORDER — POTASSIUM CHLORIDE 750 MG/1
10 TABLET, EXTENDED RELEASE ORAL 2 TIMES DAILY
COMMUNITY

## 2025-08-31 RX ORDER — NOREPINEPHRINE BITARTRATE 0.03 MG/ML
1-100 INJECTION, SOLUTION INTRAVENOUS CONTINUOUS
Status: DISCONTINUED | OUTPATIENT
Start: 2025-08-31 | End: 2025-08-31

## 2025-08-31 RX ORDER — LISINOPRIL 10 MG/1
10 TABLET ORAL DAILY
Status: DISCONTINUED | OUTPATIENT
Start: 2025-08-31 | End: 2025-09-01

## 2025-08-31 RX ORDER — MICONAZOLE NITRATE 2 G/100G
CREAM TOPICAL 2 TIMES DAILY
Status: DISCONTINUED | OUTPATIENT
Start: 2025-08-31 | End: 2025-09-06 | Stop reason: HOSPADM

## 2025-08-31 RX ORDER — METOLAZONE 5 MG/1
2.5 TABLET ORAL DAILY
Status: DISCONTINUED | OUTPATIENT
Start: 2025-08-31 | End: 2025-09-01

## 2025-08-31 RX ORDER — PENICILLIN V POTASSIUM 250 MG/1
250 TABLET, FILM COATED ORAL EVERY 8 HOURS SCHEDULED
Status: DISCONTINUED | OUTPATIENT
Start: 2025-08-31 | End: 2025-08-31

## 2025-08-31 RX ORDER — FUROSEMIDE 40 MG/1
80 TABLET ORAL DAILY
Status: DISCONTINUED | OUTPATIENT
Start: 2025-08-31 | End: 2025-09-01

## 2025-08-31 RX ORDER — GLUCAGON 1 MG/ML
1 KIT INJECTION PRN
Status: DISCONTINUED | OUTPATIENT
Start: 2025-08-31 | End: 2025-09-01

## 2025-08-31 RX ORDER — ASPIRIN 81 MG/1
81 TABLET, CHEWABLE ORAL DAILY
Status: DISCONTINUED | OUTPATIENT
Start: 2025-08-31 | End: 2025-09-01

## 2025-08-31 RX ORDER — DEXTROSE MONOHYDRATE 100 MG/ML
INJECTION, SOLUTION INTRAVENOUS CONTINUOUS PRN
Status: DISCONTINUED | OUTPATIENT
Start: 2025-08-31 | End: 2025-09-06 | Stop reason: HOSPADM

## 2025-08-31 RX ORDER — ACETAMINOPHEN 500 MG
1000 TABLET ORAL EVERY 8 HOURS PRN
Status: DISCONTINUED | OUTPATIENT
Start: 2025-08-31 | End: 2025-09-04

## 2025-08-31 RX ORDER — ATORVASTATIN CALCIUM 10 MG/1
10 TABLET, FILM COATED ORAL DAILY
Status: DISCONTINUED | OUTPATIENT
Start: 2025-08-31 | End: 2025-09-06 | Stop reason: HOSPADM

## 2025-08-31 RX ORDER — METFORMIN HYDROCHLORIDE 500 MG/1
500 TABLET, EXTENDED RELEASE ORAL
COMMUNITY

## 2025-08-31 RX ORDER — CARVEDILOL 12.5 MG/1
25 TABLET ORAL 2 TIMES DAILY
Status: DISCONTINUED | OUTPATIENT
Start: 2025-08-31 | End: 2025-09-01

## 2025-08-31 RX ORDER — NOREPINEPHRINE BITARTRATE 0.03 MG/ML
1-100 INJECTION, SOLUTION INTRAVENOUS CONTINUOUS
Status: DISCONTINUED | OUTPATIENT
Start: 2025-08-31 | End: 2025-09-01

## 2025-08-31 RX ORDER — CHOLESTYRAMINE LIGHT 4 G/5.7G
4 POWDER, FOR SUSPENSION ORAL DAILY
Status: DISCONTINUED | OUTPATIENT
Start: 2025-08-31 | End: 2025-09-06 | Stop reason: HOSPADM

## 2025-08-31 RX ORDER — AZITHROMYCIN 500 MG/1
250 TABLET, FILM COATED ORAL DAILY
Status: DISCONTINUED | OUTPATIENT
Start: 2025-09-01 | End: 2025-09-02

## 2025-08-31 RX ORDER — DABIGATRAN ETEXILATE 150 MG/1
150 CAPSULE ORAL EVERY 12 HOURS
Status: DISCONTINUED | OUTPATIENT
Start: 2025-08-31 | End: 2025-09-06 | Stop reason: HOSPADM

## 2025-08-31 RX ORDER — DOFETILIDE 0.25 MG/1
250 CAPSULE ORAL DAILY
Status: DISCONTINUED | OUTPATIENT
Start: 2025-08-31 | End: 2025-09-06 | Stop reason: HOSPADM

## 2025-08-31 RX ORDER — AZITHROMYCIN 500 MG/1
500 TABLET, FILM COATED ORAL ONCE
Status: COMPLETED | OUTPATIENT
Start: 2025-08-31 | End: 2025-08-31

## 2025-08-31 RX ORDER — ACETAMINOPHEN 500 MG
1000 TABLET ORAL
Status: ACTIVE | OUTPATIENT
Start: 2025-08-31 | End: 2025-08-31

## 2025-08-31 RX ORDER — DIPHENOXYLATE HYDROCHLORIDE AND ATROPINE SULFATE 2.5; .025 MG/1; MG/1
1 TABLET ORAL EVERY 6 HOURS
Status: DISCONTINUED | OUTPATIENT
Start: 2025-08-31 | End: 2025-09-06 | Stop reason: HOSPADM

## 2025-08-31 RX ORDER — IBUPROFEN 600 MG/1
600 TABLET, FILM COATED ORAL
Status: DISCONTINUED | OUTPATIENT
Start: 2025-08-31 | End: 2025-08-31

## 2025-08-31 RX ORDER — 0.9 % SODIUM CHLORIDE 0.9 %
1000 INTRAVENOUS SOLUTION INTRAVENOUS
Status: COMPLETED | OUTPATIENT
Start: 2025-08-31 | End: 2025-08-31

## 2025-08-31 RX ADMIN — AZITHROMYCIN DIHYDRATE 500 MG: 500 TABLET ORAL at 11:35

## 2025-08-31 RX ADMIN — SODIUM CHLORIDE 1250 MG: 9 INJECTION, SOLUTION INTRAVENOUS at 03:23

## 2025-08-31 RX ADMIN — INSULIN HUMAN 22 UNITS: 100 INJECTION, SUSPENSION SUBCUTANEOUS at 11:33

## 2025-08-31 RX ADMIN — DIPHENOXYLATE HYDROCHLORIDE AND ATROPINE SULFATE 1 TABLET: 2.5; .025 TABLET ORAL at 11:35

## 2025-08-31 RX ADMIN — DIPHENOXYLATE HYDROCHLORIDE AND ATROPINE SULFATE 1 TABLET: 2.5; .025 TABLET ORAL at 17:49

## 2025-08-31 RX ADMIN — ACETAMINOPHEN 1000 MG: 500 TABLET ORAL at 20:56

## 2025-08-31 RX ADMIN — DABIGATRAN ETEXILATE MESYLATE 150 MG: 150 CAPSULE ORAL at 22:23

## 2025-08-31 RX ADMIN — DIPHENOXYLATE HYDROCHLORIDE AND ATROPINE SULFATE 1 TABLET: 2.5; .025 TABLET ORAL at 23:30

## 2025-08-31 RX ADMIN — SODIUM CHLORIDE 1000 ML: 0.9 INJECTION, SOLUTION INTRAVENOUS at 01:45

## 2025-08-31 RX ADMIN — MICONAZOLE NITRATE: 20 CREAM TOPICAL at 10:39

## 2025-08-31 RX ADMIN — CEFTRIAXONE SODIUM 2000 MG: 2 INJECTION, POWDER, FOR SOLUTION INTRAMUSCULAR; INTRAVENOUS at 11:36

## 2025-08-31 RX ADMIN — DIPHENOXYLATE HYDROCHLORIDE AND ATROPINE SULFATE 1 TABLET: 2.5; .025 TABLET ORAL at 09:53

## 2025-08-31 RX ADMIN — INSULIN HUMAN 22 UNITS: 100 INJECTION, SUSPENSION SUBCUTANEOUS at 16:12

## 2025-08-31 RX ADMIN — DOFETILIDE 250 MCG: 0.25 CAPSULE ORAL at 10:37

## 2025-08-31 RX ADMIN — ATORVASTATIN CALCIUM 10 MG: 20 TABLET, FILM COATED ORAL at 09:53

## 2025-08-31 RX ADMIN — MICONAZOLE NITRATE: 20 CREAM TOPICAL at 20:42

## 2025-08-31 RX ADMIN — PIPERACILLIN AND TAZOBACTAM 4500 MG: 4; .5 INJECTION, POWDER, LYOPHILIZED, FOR SOLUTION INTRAVENOUS at 01:45

## 2025-08-31 RX ADMIN — CHOLESTYRAMINE 4 G: 4 POWDER, FOR SUSPENSION ORAL at 09:54

## 2025-08-31 RX ADMIN — DABIGATRAN ETEXILATE MESYLATE 150 MG: 150 CAPSULE ORAL at 09:53

## 2025-08-31 RX ADMIN — VANCOMYCIN HYDROCHLORIDE 1000 MG: 1 INJECTION, POWDER, LYOPHILIZED, FOR SOLUTION INTRAVENOUS at 02:21

## 2025-08-31 RX ADMIN — NOREPINEPHRINE BITARTRATE 5 MCG/MIN: 32 SOLUTION INTRAVENOUS at 04:30

## 2025-08-31 RX ADMIN — NOREPINEPHRINE BITARTRATE 5 MCG/MIN: 32 SOLUTION INTRAVENOUS at 05:51

## 2025-08-31 ASSESSMENT — PAIN - FUNCTIONAL ASSESSMENT: PAIN_FUNCTIONAL_ASSESSMENT: 0-10

## 2025-08-31 ASSESSMENT — PAIN SCALES - GENERAL
PAINLEVEL_OUTOF10: 0
PAINLEVEL_OUTOF10: 0
PAINLEVEL_OUTOF10: 5
PAINLEVEL_OUTOF10: 0

## 2025-08-31 ASSESSMENT — LIFESTYLE VARIABLES
HOW OFTEN DO YOU HAVE A DRINK CONTAINING ALCOHOL: NEVER
HOW MANY STANDARD DRINKS CONTAINING ALCOHOL DO YOU HAVE ON A TYPICAL DAY: PATIENT DOES NOT DRINK

## 2025-08-31 ASSESSMENT — PAIN DESCRIPTION - LOCATION: LOCATION: FOOT;LEG

## 2025-08-31 ASSESSMENT — PAIN DESCRIPTION - ORIENTATION: ORIENTATION: LEFT;RIGHT

## 2025-09-01 LAB
ANION GAP SERPL CALC-SCNC: 6 MMOL/L (ref 2–12)
BUN SERPL-MCNC: 29 MG/DL (ref 6–20)
BUN/CREAT SERPL: 25 (ref 12–20)
CA-I BLD-MCNC: 8.2 MG/DL (ref 8.5–10.1)
CHLORIDE SERPL-SCNC: 115 MMOL/L (ref 97–108)
CO2 SERPL-SCNC: 24 MMOL/L (ref 21–32)
CREAT SERPL-MCNC: 1.15 MG/DL (ref 0.7–1.3)
EKG ATRIAL RATE: 78 BPM
EKG ATRIAL RATE: 89 BPM
EKG DIAGNOSIS: NORMAL
EKG DIAGNOSIS: NORMAL
EKG P AXIS: 69 DEGREES
EKG P-R INTERVAL: 158 MS
EKG P-R INTERVAL: 200 MS
EKG Q-T INTERVAL: 364 MS
EKG Q-T INTERVAL: 436 MS
EKG QRS DURATION: 102 MS
EKG QRS DURATION: 98 MS
EKG QTC CALCULATION (BAZETT): 442 MS
EKG QTC CALCULATION (BAZETT): 497 MS
EKG R AXIS: 61 DEGREES
EKG R AXIS: 63 DEGREES
EKG T AXIS: 22 DEGREES
EKG T AXIS: 90 DEGREES
EKG VENTRICULAR RATE: 78 BPM
EKG VENTRICULAR RATE: 89 BPM
ERYTHROCYTE [DISTWIDTH] IN BLOOD BY AUTOMATED COUNT: 13.8 % (ref 11.5–14.5)
GLUCOSE BLD STRIP.AUTO-MCNC: 116 MG/DL (ref 65–100)
GLUCOSE BLD STRIP.AUTO-MCNC: 130 MG/DL (ref 65–100)
GLUCOSE BLD STRIP.AUTO-MCNC: 157 MG/DL (ref 65–100)
GLUCOSE BLD STRIP.AUTO-MCNC: 193 MG/DL (ref 65–100)
GLUCOSE SERPL-MCNC: 104 MG/DL (ref 65–100)
HCT VFR BLD AUTO: 27.4 % (ref 36.6–50.3)
HGB BLD-MCNC: 8.9 G/DL (ref 12.1–17)
LACTATE SERPL-SCNC: 1.6 MMOL/L (ref 0.4–2)
MCH RBC QN AUTO: 32.1 PG (ref 26–34)
MCHC RBC AUTO-ENTMCNC: 32.5 G/DL (ref 30–36.5)
MCV RBC AUTO: 98.9 FL (ref 80–99)
NRBC # BLD: 0 K/UL (ref 0–0.01)
NRBC BLD-RTO: 0 PER 100 WBC
PERFORMED BY:: ABNORMAL
PLATELET # BLD AUTO: 166 K/UL (ref 150–400)
PMV BLD AUTO: 10.1 FL (ref 8.9–12.9)
POTASSIUM SERPL-SCNC: 3.9 MMOL/L (ref 3.5–5.1)
RBC # BLD AUTO: 2.77 M/UL (ref 4.1–5.7)
SODIUM SERPL-SCNC: 145 MMOL/L (ref 136–145)
TROPONIN I SERPL HS-MCNC: 1717 NG/L (ref 0–76)
TROPONIN I SERPL HS-MCNC: 1938 NG/L (ref 0–76)
TROPONIN I SERPL HS-MCNC: 2260 NG/L (ref 0–76)
WBC # BLD AUTO: 17.2 K/UL (ref 4.1–11.1)

## 2025-09-01 PROCEDURE — 6360000002 HC RX W HCPCS: Performed by: INTERNAL MEDICINE

## 2025-09-01 PROCEDURE — 83605 ASSAY OF LACTIC ACID: CPT

## 2025-09-01 PROCEDURE — 6370000000 HC RX 637 (ALT 250 FOR IP): Performed by: NURSE PRACTITIONER

## 2025-09-01 PROCEDURE — 82962 GLUCOSE BLOOD TEST: CPT

## 2025-09-01 PROCEDURE — 2060000000 HC ICU INTERMEDIATE R&B

## 2025-09-01 PROCEDURE — 6370000000 HC RX 637 (ALT 250 FOR IP): Performed by: INTERNAL MEDICINE

## 2025-09-01 PROCEDURE — 94761 N-INVAS EAR/PLS OXIMETRY MLT: CPT

## 2025-09-01 PROCEDURE — 36415 COLL VENOUS BLD VENIPUNCTURE: CPT

## 2025-09-01 PROCEDURE — 84484 ASSAY OF TROPONIN QUANT: CPT

## 2025-09-01 PROCEDURE — 85027 COMPLETE CBC AUTOMATED: CPT

## 2025-09-01 PROCEDURE — 2500000003 HC RX 250 WO HCPCS: Performed by: INTERNAL MEDICINE

## 2025-09-01 PROCEDURE — 80048 BASIC METABOLIC PNL TOTAL CA: CPT

## 2025-09-01 RX ORDER — CLOPIDOGREL BISULFATE 75 MG/1
75 TABLET ORAL DAILY
Status: DISCONTINUED | OUTPATIENT
Start: 2025-09-01 | End: 2025-09-06 | Stop reason: HOSPADM

## 2025-09-01 RX ORDER — GLUCAGON 1 MG/ML
1 KIT INJECTION PRN
Status: DISCONTINUED | OUTPATIENT
Start: 2025-09-01 | End: 2025-09-06 | Stop reason: HOSPADM

## 2025-09-01 RX ORDER — DEXTROSE MONOHYDRATE 100 MG/ML
INJECTION, SOLUTION INTRAVENOUS CONTINUOUS PRN
Status: DISCONTINUED | OUTPATIENT
Start: 2025-09-01 | End: 2025-09-06 | Stop reason: HOSPADM

## 2025-09-01 RX ORDER — METOPROLOL TARTRATE 25 MG/1
25 TABLET, FILM COATED ORAL 2 TIMES DAILY
Status: DISCONTINUED | OUTPATIENT
Start: 2025-09-01 | End: 2025-09-06 | Stop reason: HOSPADM

## 2025-09-01 RX ORDER — INSULIN LISPRO 100 [IU]/ML
0-8 INJECTION, SOLUTION INTRAVENOUS; SUBCUTANEOUS
Status: DISCONTINUED | OUTPATIENT
Start: 2025-09-01 | End: 2025-09-05

## 2025-09-01 RX ADMIN — DABIGATRAN ETEXILATE MESYLATE 150 MG: 150 CAPSULE ORAL at 09:54

## 2025-09-01 RX ADMIN — METOPROLOL TARTRATE 25 MG: 25 TABLET, FILM COATED ORAL at 20:34

## 2025-09-01 RX ADMIN — INSULIN LISPRO 2 UNITS: 100 INJECTION, SOLUTION INTRAVENOUS; SUBCUTANEOUS at 17:20

## 2025-09-01 RX ADMIN — INSULIN HUMAN 22 UNITS: 100 INJECTION, SUSPENSION SUBCUTANEOUS at 09:55

## 2025-09-01 RX ADMIN — INSULIN HUMAN 22 UNITS: 100 INJECTION, SUSPENSION SUBCUTANEOUS at 17:21

## 2025-09-01 RX ADMIN — CHOLESTYRAMINE 4 G: 4 POWDER, FOR SUSPENSION ORAL at 09:54

## 2025-09-01 RX ADMIN — DIPHENOXYLATE HYDROCHLORIDE AND ATROPINE SULFATE 1 TABLET: 2.5; .025 TABLET ORAL at 17:20

## 2025-09-01 RX ADMIN — ACETAMINOPHEN 1000 MG: 500 TABLET ORAL at 23:30

## 2025-09-01 RX ADMIN — DIPHENOXYLATE HYDROCHLORIDE AND ATROPINE SULFATE 1 TABLET: 2.5; .025 TABLET ORAL at 23:11

## 2025-09-01 RX ADMIN — CLOPIDOGREL BISULFATE 75 MG: 75 TABLET, FILM COATED ORAL at 11:52

## 2025-09-01 RX ADMIN — METOPROLOL TARTRATE 25 MG: 25 TABLET, FILM COATED ORAL at 11:52

## 2025-09-01 RX ADMIN — DOFETILIDE 250 MCG: 0.25 CAPSULE ORAL at 09:54

## 2025-09-01 RX ADMIN — AZITHROMYCIN DIHYDRATE 250 MG: 500 TABLET ORAL at 09:54

## 2025-09-01 RX ADMIN — CEFTRIAXONE SODIUM 2000 MG: 2 INJECTION, POWDER, FOR SOLUTION INTRAMUSCULAR; INTRAVENOUS at 11:52

## 2025-09-01 RX ADMIN — MICONAZOLE NITRATE: 20 CREAM TOPICAL at 09:54

## 2025-09-01 RX ADMIN — ATORVASTATIN CALCIUM 10 MG: 20 TABLET, FILM COATED ORAL at 09:54

## 2025-09-01 RX ADMIN — DIPHENOXYLATE HYDROCHLORIDE AND ATROPINE SULFATE 1 TABLET: 2.5; .025 TABLET ORAL at 11:57

## 2025-09-01 RX ADMIN — DIPHENOXYLATE HYDROCHLORIDE AND ATROPINE SULFATE 1 TABLET: 2.5; .025 TABLET ORAL at 06:45

## 2025-09-01 RX ADMIN — INSULIN LISPRO 4 UNITS: 100 INJECTION, SOLUTION INTRAVENOUS; SUBCUTANEOUS at 11:55

## 2025-09-01 ASSESSMENT — PAIN SCALES - GENERAL
PAINLEVEL_OUTOF10: 0
PAINLEVEL_OUTOF10: 8

## 2025-09-01 ASSESSMENT — ENCOUNTER SYMPTOMS
RESPIRATORY NEGATIVE: 1
GASTROINTESTINAL NEGATIVE: 1

## 2025-09-02 ENCOUNTER — APPOINTMENT (OUTPATIENT)
Facility: HOSPITAL | Age: 80
End: 2025-09-02
Payer: MEDICARE

## 2025-09-02 ENCOUNTER — HOSPITAL ENCOUNTER (INPATIENT)
Facility: HOSPITAL | Age: 80
Discharge: HOME | End: 2025-09-04
Attending: INTERNAL MEDICINE
Payer: MEDICARE

## 2025-09-02 LAB
ACCESSION NUMBER, LLC1M: ABNORMAL
ACINETOBACTER CALCOAC BAUMANNII COMPLEX BY PCR: NOT DETECTED
ANION GAP SERPL CALC-SCNC: 7 MMOL/L (ref 2–12)
BACTEROIDES FRAGILIS BY PCR: NOT DETECTED
BASOPHILS # BLD: 0.06 K/UL (ref 0–0.1)
BASOPHILS NFR BLD: 0.3 % (ref 0–1)
BIOFIRE TEST COMMENT: ABNORMAL
BLACTX-M ISLT/SPM QL: NOT DETECTED
BLAIMP ISLT/SPM QL: NOT DETECTED
BLAKPC ISLT/SPM QL: NOT DETECTED
BLAOXA-48-LIKE ISLT/SPM QL: NOT DETECTED
BLAVIM ISLT/SPM QL: NOT DETECTED
BUN SERPL-MCNC: 30 MG/DL (ref 6–20)
BUN/CREAT SERPL: 21 (ref 12–20)
CA-I BLD-MCNC: 9 MG/DL (ref 8.5–10.1)
CANDIDA ALBICANS BY PCR: NOT DETECTED
CANDIDA AURIS BY PCR: NOT DETECTED
CANDIDA GLABRATA: NOT DETECTED
CANDIDA KRUSEI BY PCR: NOT DETECTED
CANDIDA PARAPSILOSIS BY PCR: NOT DETECTED
CANDIDA TROPICALIS BY PCR: NOT DETECTED
CHLORIDE SERPL-SCNC: 110 MMOL/L (ref 97–108)
CO2 SERPL-SCNC: 24 MMOL/L (ref 21–32)
CREAT SERPL-MCNC: 1.45 MG/DL (ref 0.7–1.3)
CRYPTOCOCCUS NEOFORMANS/GATTII BY PCR: NOT DETECTED
DIFFERENTIAL METHOD BLD: ABNORMAL
ECHO AO ROOT DIAM: 3.6 CM
ECHO AO ROOT INDEX: 1.57 CM/M2
ECHO AR MAX VEL PISA: 4.1 M/S
ECHO AV AREA PEAK VELOCITY: 1.6 CM2
ECHO AV AREA VTI: 1.7 CM2
ECHO AV AREA/BSA PEAK VELOCITY: 0.7 CM2/M2
ECHO AV AREA/BSA VTI: 0.7 CM2/M2
ECHO AV MEAN GRADIENT: 15 MMHG
ECHO AV MEAN VELOCITY: 1.8 M/S
ECHO AV PEAK GRADIENT: 29 MMHG
ECHO AV PEAK VELOCITY: 2.7 M/S
ECHO AV REGURGITANT PHT: 505 MS
ECHO AV VELOCITY RATIO: 0.56
ECHO AV VTI: 53.3 CM
ECHO BSA: 2.22 M2
ECHO EST RA PRESSURE: 8 MMHG
ECHO IVC EXP: 2 CM
ECHO IVC INSP: 1.7 CM
ECHO LA AREA 2C: 19.5 CM2
ECHO LA AREA 4C: 21 CM2
ECHO LA DIAMETER INDEX: 2.18 CM/M2
ECHO LA DIAMETER: 5 CM
ECHO LA MAJOR AXIS: 6.1 CM
ECHO LA MINOR AXIS: 5.8 CM
ECHO LA TO AORTIC ROOT RATIO: 1.39
ECHO LA VOL BP: 57 ML (ref 18–58)
ECHO LA VOL MOD A2C: 54 ML (ref 18–58)
ECHO LA VOL MOD A4C: 57 ML (ref 18–58)
ECHO LA VOL/BSA BIPLANE: 25 ML/M2 (ref 16–34)
ECHO LA VOLUME INDEX MOD A2C: 24 ML/M2 (ref 16–34)
ECHO LA VOLUME INDEX MOD A4C: 25 ML/M2 (ref 16–34)
ECHO LV E' LATERAL VELOCITY: 5.03 CM/S
ECHO LV E' SEPTAL VELOCITY: 6.16 CM/S
ECHO LV EDV 3D: 170 ML
ECHO LV EDV A2C: 130 ML
ECHO LV EDV A4C: 113 ML
ECHO LV EDV INDEX 3D: 74 ML/M2
ECHO LV EDV INDEX A4C: 49 ML/M2
ECHO LV EDV NDEX A2C: 57 ML/M2
ECHO LV EF PHYSICIAN: 45 %
ECHO LV EJECTION FRACTION 3D: 36 %
ECHO LV EJECTION FRACTION A2C: 38 %
ECHO LV EJECTION FRACTION A4C: 44 %
ECHO LV EJECTION FRACTION BIPLANE: 41 % (ref 55–100)
ECHO LV ESV 3D: 108 ML
ECHO LV ESV A2C: 81 ML
ECHO LV ESV A4C: 64 ML
ECHO LV ESV INDEX 3D: 47 ML/M2
ECHO LV ESV INDEX A2C: 35 ML/M2
ECHO LV ESV INDEX A4C: 28 ML/M2
ECHO LV FRACTIONAL SHORTENING: 36 % (ref 28–44)
ECHO LV INTERNAL DIMENSION DIASTOLE INDEX: 2.93 CM/M2
ECHO LV INTERNAL DIMENSION DIASTOLIC: 6.7 CM (ref 4.2–5.9)
ECHO LV INTERNAL DIMENSION SYSTOLIC INDEX: 1.88 CM/M2
ECHO LV INTERNAL DIMENSION SYSTOLIC: 4.3 CM
ECHO LV IVSD: 1 CM (ref 0.6–1)
ECHO LV MASS 2D: 279.6 G (ref 88–224)
ECHO LV MASS 3D INDEX: 90 G/M2
ECHO LV MASS 3D: 206 G
ECHO LV MASS INDEX 2D: 122.1 G/M2 (ref 49–115)
ECHO LV POSTERIOR WALL DIASTOLIC: 0.9 CM (ref 0.6–1)
ECHO LV RELATIVE WALL THICKNESS RATIO: 0.27
ECHO LVOT AREA: 2.8 CM2
ECHO LVOT AV VTI INDEX: 0.58
ECHO LVOT DIAM: 1.9 CM
ECHO LVOT MEAN GRADIENT: 6 MMHG
ECHO LVOT PEAK GRADIENT: 9 MMHG
ECHO LVOT PEAK VELOCITY: 1.5 M/S
ECHO LVOT STROKE VOLUME INDEX: 38.4 ML/M2
ECHO LVOT SV: 87.8 ML
ECHO LVOT VTI: 31 CM
ECHO MV A VELOCITY: 0.39 M/S
ECHO MV E DECELERATION TIME (DT): 171 MS
ECHO MV E VELOCITY: 0.7 M/S
ECHO MV E/A RATIO: 1.79
ECHO MV E/E' LATERAL: 13.92
ECHO MV E/E' RATIO (AVERAGED): 12.64
ECHO MV E/E' SEPTAL: 11.36
ECHO MV REGURGITANT PEAK GRADIENT: 38 MMHG
ECHO MV REGURGITANT PEAK VELOCITY: 3.1 M/S
ECHO PULMONARY ARTERY SYSTOLIC PRESSURE (PASP): 43 MMHG
ECHO PV MAX VELOCITY: 0.7 M/S
ECHO PV MEAN GRADIENT: 1 MMHG
ECHO PV MEAN VELOCITY: 0.5 M/S
ECHO PV PEAK GRADIENT: 2 MMHG
ECHO PV VTI: 14.3 CM
ECHO RA AREA 4C: 16.4 CM2
ECHO RA END SYSTOLIC VOLUME APICAL 4 CHAMBER INDEX BSA: 19 ML/M2
ECHO RA VOLUME: 43 ML
ECHO RIGHT VENTRICULAR SYSTOLIC PRESSURE (RVSP): 46 MMHG
ECHO RV BASAL DIMENSION: 4.4 CM
ECHO RV FREE WALL PEAK S': 9.3 CM/S
ECHO RV TAPSE: 1.7 CM (ref 1.7–?)
ECHO TV REGURGITANT MAX VELOCITY: 3.07 M/S
ECHO TV REGURGITANT PEAK GRADIENT: 38 MMHG
ECHO TV REGURGITANT VTI: 92.4 CM
EKG ATRIAL RATE: 79 BPM
EKG DIAGNOSIS: NORMAL
EKG P AXIS: 100 DEGREES
EKG P-R INTERVAL: 180 MS
EKG Q-T INTERVAL: 438 MS
EKG QRS DURATION: 100 MS
EKG QTC CALCULATION (BAZETT): 502 MS
EKG R AXIS: 56 DEGREES
EKG T AXIS: 69 DEGREES
EKG VENTRICULAR RATE: 79 BPM
ENTEROBACTER CLOACAE COMPLEX BY PCR: NOT DETECTED
ENTEROBACTERALES BY PCR: DETECTED
ENTEROCOCCUS FAECALIS BY PCR: NOT DETECTED
ENTEROCOCCUS FAECIUM BY PCR: NOT DETECTED
EOSINOPHIL # BLD: 0.22 K/UL (ref 0–0.4)
EOSINOPHIL NFR BLD: 1.2 % (ref 0–7)
ERYTHROCYTE [DISTWIDTH] IN BLOOD BY AUTOMATED COUNT: 13.7 % (ref 11.5–14.5)
ESCHERICHIA COLI: NOT DETECTED
EST. AVERAGE GLUCOSE BLD GHB EST-MCNC: 152 MG/DL
FLUID CULTURE: NORMAL
GLUCOSE BLD STRIP.AUTO-MCNC: 122 MG/DL (ref 65–100)
GLUCOSE BLD STRIP.AUTO-MCNC: 123 MG/DL (ref 65–100)
GLUCOSE BLD STRIP.AUTO-MCNC: 212 MG/DL (ref 65–100)
GLUCOSE BLD STRIP.AUTO-MCNC: 260 MG/DL (ref 65–100)
GLUCOSE BLD STRIP.AUTO-MCNC: 284 MG/DL (ref 65–100)
GLUCOSE SERPL-MCNC: 166 MG/DL (ref 65–100)
HAEM INFLU DNA BLD POS QL NAA+NON-PROBE: NOT DETECTED
HBA1C MFR BLD: 6.9 % (ref 4–5.6)
HCT VFR BLD AUTO: 29.2 % (ref 36.6–50.3)
HGB BLD-MCNC: 9.3 G/DL (ref 12.1–17)
IMM GRANULOCYTES # BLD AUTO: 0.13 K/UL (ref 0–0.04)
IMM GRANULOCYTES NFR BLD AUTO: 0.7 % (ref 0–0.5)
KLEBSIELLA AEROGENES BY PCR: NOT DETECTED
KLEBSIELLA OXYTOCA BY PCR: NOT DETECTED
KLEBSIELLA PNEUMONIAE GROUP BY PCR: NOT DETECTED
LISTERIA MONOCYTOGENES BY PCR: NOT DETECTED
LYMPHOCYTES # BLD: 2.01 K/UL (ref 0.8–3.5)
LYMPHOCYTES NFR BLD: 11.2 % (ref 12–49)
Lab: NORMAL
MAGNESIUM SERPL-MCNC: 2.4 MG/DL (ref 1.6–2.4)
MCH RBC QN AUTO: 31.4 PG (ref 26–34)
MCHC RBC AUTO-ENTMCNC: 31.8 G/DL (ref 30–36.5)
MCV RBC AUTO: 98.6 FL (ref 80–99)
MONOCYTES # BLD: 1.05 K/UL (ref 0–1)
MONOCYTES NFR BLD: 5.9 % (ref 5–13)
NEISSERIA MENINGITIDIS BY PCR: NOT DETECTED
NEUTS SEG # BLD: 14.45 K/UL (ref 1.8–8)
NEUTS SEG NFR BLD: 80.7 % (ref 32–75)
NRBC # BLD: 0 K/UL (ref 0–0.01)
NRBC BLD-RTO: 0 PER 100 WBC
ORGANISM ID: NORMAL
PERFORMED BY:: ABNORMAL
PHOSPHATE SERPL-MCNC: 2.1 MG/DL (ref 2.6–4.7)
PLATELET # BLD AUTO: 174 K/UL (ref 150–400)
PMV BLD AUTO: 10.9 FL (ref 8.9–12.9)
POTASSIUM SERPL-SCNC: 3.7 MMOL/L (ref 3.5–5.1)
PROTEUS BY PCR: NOT DETECTED
PSEUDOMONAS AERUGINOSA, PSAEP: NOT DETECTED
RBC # BLD AUTO: 2.96 M/UL (ref 4.1–5.7)
RESISTANT GENE NDM BY PCR: NOT DETECTED
RESISTANT GENE TARGETS: ABNORMAL
S PNEUM AG SPEC QL LA: NEGATIVE
SALMONELLA DNA BLD POS QL NAA+NON-PROBE: NOT DETECTED
SERRATIA MARCESCENS BY PCR: DETECTED
SODIUM SERPL-SCNC: 141 MMOL/L (ref 136–145)
SPECIMEN SOURCE: NORMAL
SPECIMEN: NORMAL
STAPHYLOCOCCUS AUREUS: NOT DETECTED
STAPHYLOCOCCUS EPIDERMIDIS BY PCR: NOT DETECTED
STAPHYLOCOCCUS LUGDUNENSIS BY PCR: NOT DETECTED
STAPHYLOCOCCUS: NOT DETECTED
STENOTROPHOMONAS MALTOPHILIA BY PCR: NOT DETECTED
STREPTOCOCCUS AGALACTIAE (GROUP B): NOT DETECTED
STREPTOCOCCUS PNEUMONIAE , SPNP: NOT DETECTED
STREPTOCOCCUS PYOGENES (GROUP A), SPYOP: NOT DETECTED
STREPTOCOCCUS: NOT DETECTED
TROPONIN I SERPL HS-MCNC: 1317 NG/L (ref 0–76)
TROPONIN I SERPL HS-MCNC: 633 NG/L (ref 0–76)
WBC # BLD AUTO: 17.9 K/UL (ref 4.1–11.1)

## 2025-09-02 PROCEDURE — 6370000000 HC RX 637 (ALT 250 FOR IP): Performed by: INTERNAL MEDICINE

## 2025-09-02 PROCEDURE — 82962 GLUCOSE BLOOD TEST: CPT

## 2025-09-02 PROCEDURE — 83735 ASSAY OF MAGNESIUM: CPT

## 2025-09-02 PROCEDURE — 80048 BASIC METABOLIC PNL TOTAL CA: CPT

## 2025-09-02 PROCEDURE — 6360000004 HC RX CONTRAST MEDICATION: Performed by: INTERNAL MEDICINE

## 2025-09-02 PROCEDURE — 2060000000 HC ICU INTERMEDIATE R&B

## 2025-09-02 PROCEDURE — 2580000003 HC RX 258

## 2025-09-02 PROCEDURE — 83036 HEMOGLOBIN GLYCOSYLATED A1C: CPT

## 2025-09-02 PROCEDURE — 87205 SMEAR GRAM STAIN: CPT

## 2025-09-02 PROCEDURE — 97535 SELF CARE MNGMENT TRAINING: CPT

## 2025-09-02 PROCEDURE — 6360000002 HC RX W HCPCS

## 2025-09-02 PROCEDURE — 97530 THERAPEUTIC ACTIVITIES: CPT

## 2025-09-02 PROCEDURE — 36415 COLL VENOUS BLD VENIPUNCTURE: CPT

## 2025-09-02 PROCEDURE — 97161 PT EVAL LOW COMPLEX 20 MIN: CPT

## 2025-09-02 PROCEDURE — 97165 OT EVAL LOW COMPLEX 30 MIN: CPT

## 2025-09-02 PROCEDURE — 87070 CULTURE OTHR SPECIMN AEROBIC: CPT

## 2025-09-02 PROCEDURE — 84100 ASSAY OF PHOSPHORUS: CPT

## 2025-09-02 PROCEDURE — 94761 N-INVAS EAR/PLS OXIMETRY MLT: CPT

## 2025-09-02 PROCEDURE — 6360000004 HC RX CONTRAST MEDICATION: Performed by: SURGERY

## 2025-09-02 PROCEDURE — 85025 COMPLETE CBC W/AUTO DIFF WBC: CPT

## 2025-09-02 PROCEDURE — 99223 1ST HOSP IP/OBS HIGH 75: CPT | Performed by: INTERNAL MEDICINE

## 2025-09-02 PROCEDURE — 76705 ECHO EXAM OF ABDOMEN: CPT

## 2025-09-02 PROCEDURE — 76376 3D RENDER W/INTRP POSTPROCES: CPT

## 2025-09-02 PROCEDURE — 87040 BLOOD CULTURE FOR BACTERIA: CPT

## 2025-09-02 PROCEDURE — 84484 ASSAY OF TROPONIN QUANT: CPT

## 2025-09-02 PROCEDURE — 93005 ELECTROCARDIOGRAM TRACING: CPT | Performed by: INTERNAL MEDICINE

## 2025-09-02 PROCEDURE — 6370000000 HC RX 637 (ALT 250 FOR IP): Performed by: NURSE PRACTITIONER

## 2025-09-02 RX ORDER — DIATRIZOATE MEGLUMINE AND DIATRIZOATE SODIUM 660; 100 MG/ML; MG/ML
30 SOLUTION ORAL; RECTAL
Status: DISCONTINUED | OUTPATIENT
Start: 2025-09-02 | End: 2025-09-06 | Stop reason: HOSPADM

## 2025-09-02 RX ORDER — POTASSIUM CHLORIDE 1500 MG/1
40 TABLET, EXTENDED RELEASE ORAL ONCE
Status: COMPLETED | OUTPATIENT
Start: 2025-09-02 | End: 2025-09-02

## 2025-09-02 RX ADMIN — DIPHENOXYLATE HYDROCHLORIDE AND ATROPINE SULFATE 1 TABLET: 2.5; .025 TABLET ORAL at 17:02

## 2025-09-02 RX ADMIN — ACETAMINOPHEN 1000 MG: 500 TABLET ORAL at 17:02

## 2025-09-02 RX ADMIN — INSULIN LISPRO 2 UNITS: 100 INJECTION, SOLUTION INTRAVENOUS; SUBCUTANEOUS at 21:40

## 2025-09-02 RX ADMIN — CHOLESTYRAMINE 4 G: 4 POWDER, FOR SUSPENSION ORAL at 11:46

## 2025-09-02 RX ADMIN — SULFUR HEXAFLUORIDE 4 ML: 60.7; .19; .19 INJECTION, POWDER, LYOPHILIZED, FOR SUSPENSION INTRAVENOUS; INTRAVESICAL at 10:39

## 2025-09-02 RX ADMIN — DIATRIZOATE MEGLUMINE AND DIATRIZOATE SODIUM 30 ML: 660; 100 LIQUID ORAL; RECTAL at 21:42

## 2025-09-02 RX ADMIN — MICONAZOLE NITRATE: 20 CREAM TOPICAL at 21:44

## 2025-09-02 RX ADMIN — ATORVASTATIN CALCIUM 10 MG: 20 TABLET, FILM COATED ORAL at 11:46

## 2025-09-02 RX ADMIN — CLOPIDOGREL BISULFATE 75 MG: 75 TABLET, FILM COATED ORAL at 11:46

## 2025-09-02 RX ADMIN — PIPERACILLIN AND TAZOBACTAM 4500 MG: 4; .5 INJECTION, POWDER, LYOPHILIZED, FOR SOLUTION INTRAVENOUS at 13:11

## 2025-09-02 RX ADMIN — INSULIN LISPRO 4 UNITS: 100 INJECTION, SOLUTION INTRAVENOUS; SUBCUTANEOUS at 15:32

## 2025-09-02 RX ADMIN — INSULIN HUMAN 22 UNITS: 100 INJECTION, SUSPENSION SUBCUTANEOUS at 11:45

## 2025-09-02 RX ADMIN — MICONAZOLE NITRATE: 20 CREAM TOPICAL at 12:48

## 2025-09-02 RX ADMIN — PIPERACILLIN AND TAZOBACTAM 3375 MG: 3; .375 INJECTION, POWDER, LYOPHILIZED, FOR SOLUTION INTRAVENOUS at 18:14

## 2025-09-02 RX ADMIN — DIPHENOXYLATE HYDROCHLORIDE AND ATROPINE SULFATE 1 TABLET: 2.5; .025 TABLET ORAL at 11:55

## 2025-09-02 RX ADMIN — METOPROLOL TARTRATE 25 MG: 25 TABLET, FILM COATED ORAL at 21:40

## 2025-09-02 RX ADMIN — POTASSIUM CHLORIDE 40 MEQ: 1500 TABLET, EXTENDED RELEASE ORAL at 15:32

## 2025-09-02 RX ADMIN — DIPHENOXYLATE HYDROCHLORIDE AND ATROPINE SULFATE 1 TABLET: 2.5; .025 TABLET ORAL at 06:09

## 2025-09-02 RX ADMIN — METOPROLOL TARTRATE 25 MG: 25 TABLET, FILM COATED ORAL at 11:46

## 2025-09-02 RX ADMIN — DOFETILIDE 250 MCG: 0.25 CAPSULE ORAL at 12:48

## 2025-09-02 RX ADMIN — INSULIN HUMAN 22 UNITS: 100 INJECTION, SUSPENSION SUBCUTANEOUS at 15:32

## 2025-09-02 ASSESSMENT — ENCOUNTER SYMPTOMS
GASTROINTESTINAL NEGATIVE: 1
RESPIRATORY NEGATIVE: 1

## 2025-09-02 ASSESSMENT — PAIN SCALES - GENERAL
PAINLEVEL_OUTOF10: 0
PAINLEVEL_OUTOF10: 5

## 2025-09-02 ASSESSMENT — PAIN DESCRIPTION - LOCATION: LOCATION: KNEE

## 2025-09-03 ENCOUNTER — APPOINTMENT (OUTPATIENT)
Facility: HOSPITAL | Age: 80
End: 2025-09-03
Payer: MEDICARE

## 2025-09-03 PROBLEM — L03.116 CELLULITIS OF LEG, LEFT: Status: ACTIVE | Noted: 2025-09-03

## 2025-09-03 PROBLEM — S51.801A WOUND, OPEN, ARM, FOREARM, RIGHT, INITIAL ENCOUNTER: Status: ACTIVE | Noted: 2025-09-03

## 2025-09-03 PROBLEM — R30.0 DYSURIA: Status: ACTIVE | Noted: 2025-09-03

## 2025-09-03 LAB
ANION GAP SERPL CALC-SCNC: 4 MMOL/L (ref 2–12)
APPEARANCE UR: CLEAR
BACTERIA SPEC CULT: NORMAL
BACTERIA URNS QL MICRO: NEGATIVE /HPF
BASOPHILS # BLD: 0.07 K/UL (ref 0–0.1)
BASOPHILS NFR BLD: 0.5 % (ref 0–1)
BILIRUB UR QL: NEGATIVE
BUN SERPL-MCNC: 27 MG/DL (ref 6–20)
BUN/CREAT SERPL: 26 (ref 12–20)
CA-I BLD-MCNC: 8.2 MG/DL (ref 8.5–10.1)
CHLORIDE SERPL-SCNC: 115 MMOL/L (ref 97–108)
CO2 SERPL-SCNC: 23 MMOL/L (ref 21–32)
COLOR UR: ABNORMAL
CREAT SERPL-MCNC: 1.03 MG/DL (ref 0.7–1.3)
CRP SERPL-MCNC: 10 MG/DL (ref 0–0.3)
DIFFERENTIAL METHOD BLD: ABNORMAL
EOSINOPHIL # BLD: 0.28 K/UL (ref 0–0.4)
EOSINOPHIL NFR BLD: 1.9 % (ref 0–7)
EPITH CASTS URNS QL MICRO: ABNORMAL /LPF
ERYTHROCYTE [DISTWIDTH] IN BLOOD BY AUTOMATED COUNT: 13.3 % (ref 11.5–14.5)
GGT SERPL-CCNC: 29 U/L (ref 8–61)
GLUCOSE BLD STRIP.AUTO-MCNC: 118 MG/DL (ref 65–100)
GLUCOSE BLD STRIP.AUTO-MCNC: 181 MG/DL (ref 65–100)
GLUCOSE BLD STRIP.AUTO-MCNC: 226 MG/DL (ref 65–100)
GLUCOSE BLD STRIP.AUTO-MCNC: 231 MG/DL (ref 65–100)
GLUCOSE BLD STRIP.AUTO-MCNC: 271 MG/DL (ref 65–100)
GLUCOSE SERPL-MCNC: 47 MG/DL (ref 65–100)
GLUCOSE UR STRIP.AUTO-MCNC: NEGATIVE MG/DL
HCT VFR BLD AUTO: 28.9 % (ref 36.6–50.3)
HGB BLD-MCNC: 9.2 G/DL (ref 12.1–17)
HGB UR QL STRIP: NEGATIVE
IMM GRANULOCYTES # BLD AUTO: 0.14 K/UL (ref 0–0.04)
IMM GRANULOCYTES NFR BLD AUTO: 0.9 % (ref 0–0.5)
KETONES UR QL STRIP.AUTO: NEGATIVE MG/DL
LEUKOCYTE ESTERASE UR QL STRIP.AUTO: NEGATIVE
LYMPHOCYTES # BLD: 1.79 K/UL (ref 0.8–3.5)
LYMPHOCYTES NFR BLD: 11.9 % (ref 12–49)
Lab: NORMAL
MAGNESIUM SERPL-MCNC: 2.4 MG/DL (ref 1.6–2.4)
MCH RBC QN AUTO: 31.9 PG (ref 26–34)
MCHC RBC AUTO-ENTMCNC: 31.8 G/DL (ref 30–36.5)
MCV RBC AUTO: 100.3 FL (ref 80–99)
MONOCYTES # BLD: 0.89 K/UL (ref 0–1)
MONOCYTES NFR BLD: 5.9 % (ref 5–13)
NEUTS SEG # BLD: 11.85 K/UL (ref 1.8–8)
NEUTS SEG NFR BLD: 78.9 % (ref 32–75)
NITRITE UR QL STRIP.AUTO: NEGATIVE
NRBC # BLD: 0 K/UL (ref 0–0.01)
NRBC BLD-RTO: 0 PER 100 WBC
PERFORMED BY:: ABNORMAL
PH UR STRIP: 5 (ref 5–8)
PHOSPHATE SERPL-MCNC: 2.7 MG/DL (ref 2.6–4.7)
PLATELET # BLD AUTO: 178 K/UL (ref 150–400)
PMV BLD AUTO: 10.7 FL (ref 8.9–12.9)
POTASSIUM SERPL-SCNC: 3.8 MMOL/L (ref 3.5–5.1)
PROCALCITONIN SERPL-MCNC: 1.44 NG/ML
PROT UR STRIP-MCNC: NEGATIVE MG/DL
RBC # BLD AUTO: 2.88 M/UL (ref 4.1–5.7)
RBC #/AREA URNS HPF: ABNORMAL /HPF (ref 0–5)
SODIUM SERPL-SCNC: 142 MMOL/L (ref 136–145)
SP GR UR REFRACTOMETRY: >1.03 (ref 1–1.03)
URINE CULTURE IF INDICATED: ABNORMAL
UROBILINOGEN UR QL STRIP.AUTO: 0.1 EU/DL (ref 0.1–1)
WBC # BLD AUTO: 15 K/UL (ref 4.1–11.1)
WBC URNS QL MICRO: ABNORMAL /HPF (ref 0–4)

## 2025-09-03 PROCEDURE — 84145 PROCALCITONIN (PCT): CPT

## 2025-09-03 PROCEDURE — 82977 ASSAY OF GGT: CPT

## 2025-09-03 PROCEDURE — 84100 ASSAY OF PHOSPHORUS: CPT

## 2025-09-03 PROCEDURE — 86060 ANTISTREPTOLYSIN O TITER: CPT

## 2025-09-03 PROCEDURE — 6360000004 HC RX CONTRAST MEDICATION

## 2025-09-03 PROCEDURE — 81001 URINALYSIS AUTO W/SCOPE: CPT

## 2025-09-03 PROCEDURE — 6370000000 HC RX 637 (ALT 250 FOR IP): Performed by: NURSE PRACTITIONER

## 2025-09-03 PROCEDURE — 6370000000 HC RX 637 (ALT 250 FOR IP): Performed by: INTERNAL MEDICINE

## 2025-09-03 PROCEDURE — 6360000004 HC RX CONTRAST MEDICATION: Performed by: SURGERY

## 2025-09-03 PROCEDURE — 86215 DEOXYRIBONUCLEASE ANTIBODY: CPT

## 2025-09-03 PROCEDURE — 82962 GLUCOSE BLOOD TEST: CPT

## 2025-09-03 PROCEDURE — 80048 BASIC METABOLIC PNL TOTAL CA: CPT

## 2025-09-03 PROCEDURE — 2060000000 HC ICU INTERMEDIATE R&B

## 2025-09-03 PROCEDURE — 2580000003 HC RX 258

## 2025-09-03 PROCEDURE — 83735 ASSAY OF MAGNESIUM: CPT

## 2025-09-03 PROCEDURE — 36415 COLL VENOUS BLD VENIPUNCTURE: CPT

## 2025-09-03 PROCEDURE — 85025 COMPLETE CBC W/AUTO DIFF WBC: CPT

## 2025-09-03 PROCEDURE — 78227 HEPATOBIL SYST IMAGE W/DRUG: CPT

## 2025-09-03 PROCEDURE — 6360000002 HC RX W HCPCS

## 2025-09-03 PROCEDURE — A9537 TC99M MEBROFENIN: HCPCS | Performed by: SURGERY

## 2025-09-03 PROCEDURE — 3430000000 HC RX DIAGNOSTIC RADIOPHARMACEUTICAL: Performed by: SURGERY

## 2025-09-03 PROCEDURE — 74177 CT ABD & PELVIS W/CONTRAST: CPT

## 2025-09-03 PROCEDURE — 86140 C-REACTIVE PROTEIN: CPT

## 2025-09-03 RX ORDER — IOPAMIDOL 755 MG/ML
100 INJECTION, SOLUTION INTRAVASCULAR
Status: COMPLETED | OUTPATIENT
Start: 2025-09-03 | End: 2025-09-03

## 2025-09-03 RX ORDER — SINCALIDE 5 UG/5ML
INJECTION, POWDER, LYOPHILIZED, FOR SOLUTION INTRAVENOUS
Status: COMPLETED
Start: 2025-09-03 | End: 2025-09-03

## 2025-09-03 RX ORDER — KIT FOR THE PREPARATION OF TECHNETIUM TC 99M MEBROFENIN 45 MG/10ML
6.44 INJECTION, POWDER, LYOPHILIZED, FOR SOLUTION INTRAVENOUS
Status: COMPLETED | OUTPATIENT
Start: 2025-09-03 | End: 2025-09-03

## 2025-09-03 RX ADMIN — DIPHENOXYLATE HYDROCHLORIDE AND ATROPINE SULFATE 1 TABLET: 2.5; .025 TABLET ORAL at 17:57

## 2025-09-03 RX ADMIN — DIPHENOXYLATE HYDROCHLORIDE AND ATROPINE SULFATE 1 TABLET: 2.5; .025 TABLET ORAL at 06:23

## 2025-09-03 RX ADMIN — ACETAMINOPHEN 1000 MG: 500 TABLET ORAL at 09:44

## 2025-09-03 RX ADMIN — ATORVASTATIN CALCIUM 10 MG: 20 TABLET, FILM COATED ORAL at 09:40

## 2025-09-03 RX ADMIN — DOFETILIDE 250 MCG: 0.25 CAPSULE ORAL at 09:44

## 2025-09-03 RX ADMIN — PIPERACILLIN AND TAZOBACTAM 3375 MG: 3; .375 INJECTION, POWDER, LYOPHILIZED, FOR SOLUTION INTRAVENOUS at 17:59

## 2025-09-03 RX ADMIN — PIPERACILLIN AND TAZOBACTAM 3375 MG: 3; .375 INJECTION, POWDER, LYOPHILIZED, FOR SOLUTION INTRAVENOUS at 00:30

## 2025-09-03 RX ADMIN — MICONAZOLE NITRATE: 20 CREAM TOPICAL at 09:48

## 2025-09-03 RX ADMIN — CLOPIDOGREL BISULFATE 75 MG: 75 TABLET, FILM COATED ORAL at 09:39

## 2025-09-03 RX ADMIN — MICONAZOLE NITRATE: 20 CREAM TOPICAL at 21:01

## 2025-09-03 RX ADMIN — INSULIN HUMAN 22 UNITS: 100 INJECTION, SUSPENSION SUBCUTANEOUS at 09:40

## 2025-09-03 RX ADMIN — METOPROLOL TARTRATE 25 MG: 25 TABLET, FILM COATED ORAL at 20:58

## 2025-09-03 RX ADMIN — INSULIN HUMAN 22 UNITS: 100 INJECTION, SUSPENSION SUBCUTANEOUS at 17:57

## 2025-09-03 RX ADMIN — SINCALIDE 2.04 MCG: 5 INJECTION, POWDER, LYOPHILIZED, FOR SOLUTION INTRAVENOUS at 14:20

## 2025-09-03 RX ADMIN — MEBROFENIN 6.44 MILLICURIE: 45 INJECTION, POWDER, LYOPHILIZED, FOR SOLUTION INTRAVENOUS at 13:20

## 2025-09-03 RX ADMIN — IOPAMIDOL 100 ML: 755 INJECTION, SOLUTION INTRAVENOUS at 01:30

## 2025-09-03 RX ADMIN — DIPHENOXYLATE HYDROCHLORIDE AND ATROPINE SULFATE 1 TABLET: 2.5; .025 TABLET ORAL at 00:27

## 2025-09-03 RX ADMIN — ACETAMINOPHEN 1000 MG: 500 TABLET ORAL at 21:05

## 2025-09-03 RX ADMIN — METOPROLOL TARTRATE 25 MG: 25 TABLET, FILM COATED ORAL at 09:40

## 2025-09-03 RX ADMIN — PIPERACILLIN AND TAZOBACTAM 3375 MG: 3; .375 INJECTION, POWDER, LYOPHILIZED, FOR SOLUTION INTRAVENOUS at 09:47

## 2025-09-03 RX ADMIN — CHOLESTYRAMINE 4 G: 4 POWDER, FOR SUSPENSION ORAL at 09:40

## 2025-09-03 ASSESSMENT — PAIN SCALES - GENERAL
PAINLEVEL_OUTOF10: 7
PAINLEVEL_OUTOF10: 5
PAINLEVEL_OUTOF10: 6

## 2025-09-03 ASSESSMENT — ENCOUNTER SYMPTOMS
EYES NEGATIVE: 1
ALLERGIC/IMMUNOLOGIC NEGATIVE: 1
COUGH: 0
SHORTNESS OF BREATH: 1
CHEST TIGHTNESS: 0
GASTROINTESTINAL NEGATIVE: 1

## 2025-09-03 ASSESSMENT — PAIN - FUNCTIONAL ASSESSMENT
PAIN_FUNCTIONAL_ASSESSMENT: 0-10

## 2025-09-03 ASSESSMENT — PAIN DESCRIPTION - LOCATION: LOCATION: HEAD

## 2025-09-04 ENCOUNTER — APPOINTMENT (OUTPATIENT)
Facility: HOSPITAL | Age: 80
End: 2025-09-04
Payer: MEDICARE

## 2025-09-04 PROBLEM — E44.0 MODERATE PROTEIN MALNUTRITION: Status: ACTIVE | Noted: 2025-09-04

## 2025-09-04 LAB
ANION GAP SERPL CALC-SCNC: 5 MMOL/L (ref 2–12)
ASO AB SERPL-ACNC: <20 IU/ML (ref 0–200)
BASOPHILS # BLD: 0.06 K/UL (ref 0–0.1)
BASOPHILS NFR BLD: 0.7 % (ref 0–1)
BUN SERPL-MCNC: 24 MG/DL (ref 6–20)
BUN/CREAT SERPL: 22 (ref 12–20)
CA-I BLD-MCNC: 8.1 MG/DL (ref 8.5–10.1)
CHLORIDE SERPL-SCNC: 112 MMOL/L (ref 97–108)
CO2 SERPL-SCNC: 22 MMOL/L (ref 21–32)
CREAT SERPL-MCNC: 1.08 MG/DL (ref 0.7–1.3)
CRP SERPL-MCNC: 6.56 MG/DL (ref 0–0.3)
DIFFERENTIAL METHOD BLD: ABNORMAL
ECHO BSA: 2.22 M2
EOSINOPHIL # BLD: 0.33 K/UL (ref 0–0.4)
EOSINOPHIL NFR BLD: 3.9 % (ref 0–7)
ERYTHROCYTE [DISTWIDTH] IN BLOOD BY AUTOMATED COUNT: 13.3 % (ref 11.5–14.5)
GLUCOSE BLD STRIP.AUTO-MCNC: 185 MG/DL (ref 65–100)
GLUCOSE BLD STRIP.AUTO-MCNC: 242 MG/DL (ref 65–100)
GLUCOSE BLD STRIP.AUTO-MCNC: 256 MG/DL (ref 65–100)
GLUCOSE BLD STRIP.AUTO-MCNC: 90 MG/DL (ref 65–100)
GLUCOSE SERPL-MCNC: 108 MG/DL (ref 65–100)
HCT VFR BLD AUTO: 28.6 % (ref 36.6–50.3)
HGB BLD-MCNC: 8.9 G/DL (ref 12.1–17)
IMM GRANULOCYTES # BLD AUTO: 0.05 K/UL (ref 0–0.04)
IMM GRANULOCYTES NFR BLD AUTO: 0.6 % (ref 0–0.5)
LYMPHOCYTES # BLD: 1.43 K/UL (ref 0.8–3.5)
LYMPHOCYTES NFR BLD: 16.9 % (ref 12–49)
MAGNESIUM SERPL-MCNC: 2.4 MG/DL (ref 1.6–2.4)
MCH RBC QN AUTO: 31.4 PG (ref 26–34)
MCHC RBC AUTO-ENTMCNC: 31.1 G/DL (ref 30–36.5)
MCV RBC AUTO: 101.1 FL (ref 80–99)
MONOCYTES # BLD: 0.74 K/UL (ref 0–1)
MONOCYTES NFR BLD: 8.7 % (ref 5–13)
NEUTS SEG # BLD: 5.87 K/UL (ref 1.8–8)
NEUTS SEG NFR BLD: 69.2 % (ref 32–75)
NRBC # BLD: 0 K/UL (ref 0–0.01)
NRBC BLD-RTO: 0 PER 100 WBC
PERFORMED BY:: ABNORMAL
PERFORMED BY:: NORMAL
PHOSPHATE SERPL-MCNC: 2.4 MG/DL (ref 2.6–4.7)
PLATELET # BLD AUTO: 163 K/UL (ref 150–400)
PMV BLD AUTO: 11.5 FL (ref 8.9–12.9)
POTASSIUM SERPL-SCNC: 4.4 MMOL/L (ref 3.5–5.1)
PROCALCITONIN SERPL-MCNC: 0.74 NG/ML
RBC # BLD AUTO: 2.83 M/UL (ref 4.1–5.7)
SODIUM SERPL-SCNC: 139 MMOL/L (ref 136–145)
WBC # BLD AUTO: 8.5 K/UL (ref 4.1–11.1)

## 2025-09-04 PROCEDURE — 6360000002 HC RX W HCPCS: Performed by: INTERNAL MEDICINE

## 2025-09-04 PROCEDURE — 2580000003 HC RX 258

## 2025-09-04 PROCEDURE — C1769 GUIDE WIRE: HCPCS | Performed by: INTERNAL MEDICINE

## 2025-09-04 PROCEDURE — 93459 L HRT ART/GRFT ANGIO: CPT | Performed by: INTERNAL MEDICINE

## 2025-09-04 PROCEDURE — 7100000001 HC PACU RECOVERY - ADDTL 15 MIN: Performed by: INTERNAL MEDICINE

## 2025-09-04 PROCEDURE — 99153 MOD SED SAME PHYS/QHP EA: CPT | Performed by: INTERNAL MEDICINE

## 2025-09-04 PROCEDURE — 6360000002 HC RX W HCPCS: Performed by: PHYSICIAN ASSISTANT

## 2025-09-04 PROCEDURE — 80048 BASIC METABOLIC PNL TOTAL CA: CPT

## 2025-09-04 PROCEDURE — 84145 PROCALCITONIN (PCT): CPT

## 2025-09-04 PROCEDURE — 71045 X-RAY EXAM CHEST 1 VIEW: CPT

## 2025-09-04 PROCEDURE — 6360000002 HC RX W HCPCS

## 2025-09-04 PROCEDURE — 2580000003 HC RX 258: Performed by: INTERNAL MEDICINE

## 2025-09-04 PROCEDURE — 6360000004 HC RX CONTRAST MEDICATION: Performed by: INTERNAL MEDICINE

## 2025-09-04 PROCEDURE — 99152 MOD SED SAME PHYS/QHP 5/>YRS: CPT | Performed by: INTERNAL MEDICINE

## 2025-09-04 PROCEDURE — C1894 INTRO/SHEATH, NON-LASER: HCPCS | Performed by: INTERNAL MEDICINE

## 2025-09-04 PROCEDURE — 2500000003 HC RX 250 WO HCPCS: Performed by: INTERNAL MEDICINE

## 2025-09-04 PROCEDURE — 83735 ASSAY OF MAGNESIUM: CPT

## 2025-09-04 PROCEDURE — 7100000000 HC PACU RECOVERY - FIRST 15 MIN: Performed by: INTERNAL MEDICINE

## 2025-09-04 PROCEDURE — 2709999900 HC NON-CHARGEABLE SUPPLY: Performed by: INTERNAL MEDICINE

## 2025-09-04 PROCEDURE — 6370000000 HC RX 637 (ALT 250 FOR IP): Performed by: INTERNAL MEDICINE

## 2025-09-04 PROCEDURE — 84100 ASSAY OF PHOSPHORUS: CPT

## 2025-09-04 PROCEDURE — 85025 COMPLETE CBC W/AUTO DIFF WBC: CPT

## 2025-09-04 PROCEDURE — 36415 COLL VENOUS BLD VENIPUNCTURE: CPT

## 2025-09-04 PROCEDURE — 82962 GLUCOSE BLOOD TEST: CPT

## 2025-09-04 PROCEDURE — 2060000000 HC ICU INTERMEDIATE R&B

## 2025-09-04 PROCEDURE — 6370000000 HC RX 637 (ALT 250 FOR IP): Performed by: NURSE PRACTITIONER

## 2025-09-04 PROCEDURE — 86140 C-REACTIVE PROTEIN: CPT

## 2025-09-04 RX ORDER — SODIUM CHLORIDE 9 MG/ML
INJECTION, SOLUTION INTRAVENOUS CONTINUOUS
Status: DISPENSED | OUTPATIENT
Start: 2025-09-04 | End: 2025-09-04

## 2025-09-04 RX ORDER — IOPAMIDOL 755 MG/ML
INJECTION, SOLUTION INTRAVASCULAR PRN
Status: DISCONTINUED | OUTPATIENT
Start: 2025-09-04 | End: 2025-09-04 | Stop reason: HOSPADM

## 2025-09-04 RX ORDER — LIDOCAINE HYDROCHLORIDE 10 MG/ML
INJECTION, SOLUTION INFILTRATION; PERINEURAL PRN
Status: DISCONTINUED | OUTPATIENT
Start: 2025-09-04 | End: 2025-09-04 | Stop reason: HOSPADM

## 2025-09-04 RX ORDER — FUROSEMIDE 10 MG/ML
40 INJECTION INTRAMUSCULAR; INTRAVENOUS ONCE
Status: COMPLETED | OUTPATIENT
Start: 2025-09-04 | End: 2025-09-04

## 2025-09-04 RX ORDER — SODIUM CHLORIDE 0.9 % (FLUSH) 0.9 %
5-40 SYRINGE (ML) INJECTION PRN
Status: DISCONTINUED | OUTPATIENT
Start: 2025-09-04 | End: 2025-09-06 | Stop reason: HOSPADM

## 2025-09-04 RX ORDER — ACETAMINOPHEN 325 MG/1
650 TABLET ORAL EVERY 4 HOURS PRN
Status: DISCONTINUED | OUTPATIENT
Start: 2025-09-04 | End: 2025-09-06 | Stop reason: HOSPADM

## 2025-09-04 RX ORDER — HEPARIN SODIUM 1000 [USP'U]/ML
INJECTION, SOLUTION INTRAVENOUS; SUBCUTANEOUS PRN
Status: DISCONTINUED | OUTPATIENT
Start: 2025-09-04 | End: 2025-09-04 | Stop reason: HOSPADM

## 2025-09-04 RX ORDER — FENTANYL CITRATE 50 UG/ML
INJECTION, SOLUTION INTRAMUSCULAR; INTRAVENOUS PRN
Status: DISCONTINUED | OUTPATIENT
Start: 2025-09-04 | End: 2025-09-04 | Stop reason: HOSPADM

## 2025-09-04 RX ORDER — MIDAZOLAM HYDROCHLORIDE 1 MG/ML
INJECTION, SOLUTION INTRAMUSCULAR; INTRAVENOUS PRN
Status: DISCONTINUED | OUTPATIENT
Start: 2025-09-04 | End: 2025-09-04 | Stop reason: HOSPADM

## 2025-09-04 RX ORDER — HEPARIN SODIUM 200 [USP'U]/100ML
INJECTION, SOLUTION INTRAVENOUS CONTINUOUS PRN
Status: COMPLETED | OUTPATIENT
Start: 2025-09-04 | End: 2025-09-04

## 2025-09-04 RX ORDER — SODIUM CHLORIDE 0.9 % (FLUSH) 0.9 %
5-40 SYRINGE (ML) INJECTION EVERY 12 HOURS SCHEDULED
Status: DISCONTINUED | OUTPATIENT
Start: 2025-09-04 | End: 2025-09-06 | Stop reason: HOSPADM

## 2025-09-04 RX ADMIN — FUROSEMIDE 40 MG: 10 INJECTION, SOLUTION INTRAMUSCULAR; INTRAVENOUS at 23:17

## 2025-09-04 RX ADMIN — MICONAZOLE NITRATE: 20 CREAM TOPICAL at 08:08

## 2025-09-04 RX ADMIN — ACETAMINOPHEN 1000 MG: 500 TABLET ORAL at 06:03

## 2025-09-04 RX ADMIN — DIPHENOXYLATE HYDROCHLORIDE AND ATROPINE SULFATE 1 TABLET: 2.5; .025 TABLET ORAL at 17:41

## 2025-09-04 RX ADMIN — DOFETILIDE 250 MCG: 0.25 CAPSULE ORAL at 09:09

## 2025-09-04 RX ADMIN — DIPHENOXYLATE HYDROCHLORIDE AND ATROPINE SULFATE 1 TABLET: 2.5; .025 TABLET ORAL at 06:03

## 2025-09-04 RX ADMIN — SODIUM CHLORIDE, PRESERVATIVE FREE 10 ML: 5 INJECTION INTRAVENOUS at 23:27

## 2025-09-04 RX ADMIN — DIPHENOXYLATE HYDROCHLORIDE AND ATROPINE SULFATE 1 TABLET: 2.5; .025 TABLET ORAL at 01:21

## 2025-09-04 RX ADMIN — SODIUM CHLORIDE: 0.9 INJECTION, SOLUTION INTRAVENOUS at 16:22

## 2025-09-04 RX ADMIN — PIPERACILLIN AND TAZOBACTAM 3375 MG: 3; .375 INJECTION, POWDER, LYOPHILIZED, FOR SOLUTION INTRAVENOUS at 17:46

## 2025-09-04 RX ADMIN — INSULIN HUMAN 22 UNITS: 100 INJECTION, SUSPENSION SUBCUTANEOUS at 09:06

## 2025-09-04 RX ADMIN — PIPERACILLIN AND TAZOBACTAM 3375 MG: 3; .375 INJECTION, POWDER, LYOPHILIZED, FOR SOLUTION INTRAVENOUS at 01:21

## 2025-09-04 RX ADMIN — ACETAMINOPHEN 650 MG: 325 TABLET ORAL at 23:25

## 2025-09-04 RX ADMIN — ACETAMINOPHEN 650 MG: 325 TABLET ORAL at 16:04

## 2025-09-04 RX ADMIN — ATORVASTATIN CALCIUM 10 MG: 20 TABLET, FILM COATED ORAL at 09:08

## 2025-09-04 RX ADMIN — METOPROLOL TARTRATE 25 MG: 25 TABLET, FILM COATED ORAL at 09:08

## 2025-09-04 RX ADMIN — CHOLESTYRAMINE 4 G: 4 POWDER, FOR SUSPENSION ORAL at 09:07

## 2025-09-04 RX ADMIN — DABIGATRAN ETEXILATE MESYLATE 150 MG: 150 CAPSULE ORAL at 21:58

## 2025-09-04 RX ADMIN — CLOPIDOGREL BISULFATE 75 MG: 75 TABLET, FILM COATED ORAL at 09:08

## 2025-09-04 RX ADMIN — PIPERACILLIN AND TAZOBACTAM 3375 MG: 3; .375 INJECTION, POWDER, LYOPHILIZED, FOR SOLUTION INTRAVENOUS at 09:10

## 2025-09-04 RX ADMIN — INSULIN HUMAN 22 UNITS: 100 INJECTION, SUSPENSION SUBCUTANEOUS at 17:41

## 2025-09-04 ASSESSMENT — PAIN SCALES - GENERAL
PAINLEVEL_OUTOF10: 4
PAINLEVEL_OUTOF10: 6
PAINLEVEL_OUTOF10: 3
PAINLEVEL_OUTOF10: 6
PAINLEVEL_OUTOF10: 8
PAINLEVEL_OUTOF10: 5

## 2025-09-04 ASSESSMENT — PAIN - FUNCTIONAL ASSESSMENT
PAIN_FUNCTIONAL_ASSESSMENT: 0-10

## 2025-09-04 ASSESSMENT — PAIN DESCRIPTION - ORIENTATION
ORIENTATION: LEFT
ORIENTATION: LEFT

## 2025-09-04 ASSESSMENT — PAIN DESCRIPTION - LOCATION
LOCATION: LEG;KNEE
LOCATION: HEAD
LOCATION: KNEE;LEG
LOCATION: LEG

## 2025-09-04 ASSESSMENT — PAIN DESCRIPTION - DESCRIPTORS
DESCRIPTORS: ACHING
DESCRIPTORS: ACHING

## 2025-09-05 LAB
ANION GAP SERPL CALC-SCNC: 6 MMOL/L (ref 2–12)
BASOPHILS # BLD: 0.07 K/UL (ref 0–0.1)
BASOPHILS NFR BLD: 0.9 % (ref 0–1)
BUN SERPL-MCNC: 22 MG/DL (ref 6–20)
BUN/CREAT SERPL: 23 (ref 12–20)
CA-I BLD-MCNC: 8.7 MG/DL (ref 8.5–10.1)
CHLORIDE SERPL-SCNC: 111 MMOL/L (ref 97–108)
CO2 SERPL-SCNC: 24 MMOL/L (ref 21–32)
CREAT SERPL-MCNC: 0.95 MG/DL (ref 0.7–1.3)
CRP SERPL-MCNC: 3.21 MG/DL (ref 0–0.3)
DIFFERENTIAL METHOD BLD: ABNORMAL
EOSINOPHIL # BLD: 0.37 K/UL (ref 0–0.4)
EOSINOPHIL NFR BLD: 5 % (ref 0–7)
ERYTHROCYTE [DISTWIDTH] IN BLOOD BY AUTOMATED COUNT: 13.1 % (ref 11.5–14.5)
GLUCOSE BLD STRIP.AUTO-MCNC: 229 MG/DL (ref 65–100)
GLUCOSE BLD STRIP.AUTO-MCNC: 231 MG/DL (ref 65–100)
GLUCOSE BLD STRIP.AUTO-MCNC: 254 MG/DL (ref 65–100)
GLUCOSE BLD STRIP.AUTO-MCNC: 263 MG/DL (ref 65–100)
GLUCOSE BLD STRIP.AUTO-MCNC: 62 MG/DL (ref 65–100)
GLUCOSE BLD STRIP.AUTO-MCNC: 95 MG/DL (ref 65–100)
GLUCOSE SERPL-MCNC: 64 MG/DL (ref 65–100)
HCT VFR BLD AUTO: 29.2 % (ref 36.6–50.3)
HGB BLD-MCNC: 9.1 G/DL (ref 12.1–17)
IMM GRANULOCYTES # BLD AUTO: 0.05 K/UL (ref 0–0.04)
IMM GRANULOCYTES NFR BLD AUTO: 0.7 % (ref 0–0.5)
LYMPHOCYTES # BLD: 1.68 K/UL (ref 0.8–3.5)
LYMPHOCYTES NFR BLD: 22.6 % (ref 12–49)
MAGNESIUM SERPL-MCNC: 2.4 MG/DL (ref 1.6–2.4)
MCH RBC QN AUTO: 31.2 PG (ref 26–34)
MCHC RBC AUTO-ENTMCNC: 31.2 G/DL (ref 30–36.5)
MCV RBC AUTO: 100 FL (ref 80–99)
MONOCYTES # BLD: 0.69 K/UL (ref 0–1)
MONOCYTES NFR BLD: 9.3 % (ref 5–13)
NEUTS SEG # BLD: 4.58 K/UL (ref 1.8–8)
NEUTS SEG NFR BLD: 61.5 % (ref 32–75)
NRBC # BLD: 0 K/UL (ref 0–0.01)
NRBC BLD-RTO: 0 PER 100 WBC
PERFORMED BY:: ABNORMAL
PERFORMED BY:: NORMAL
PHOSPHATE SERPL-MCNC: 3 MG/DL (ref 2.6–4.7)
PLATELET # BLD AUTO: 234 K/UL (ref 150–400)
PMV BLD AUTO: 10.3 FL (ref 8.9–12.9)
POTASSIUM SERPL-SCNC: 4 MMOL/L (ref 3.5–5.1)
PROCALCITONIN SERPL-MCNC: 0.36 NG/ML
RBC # BLD AUTO: 2.92 M/UL (ref 4.1–5.7)
SODIUM SERPL-SCNC: 141 MMOL/L (ref 136–145)
STREP DNASE B SER-ACNC: <78 U/ML (ref 0–120)
WBC # BLD AUTO: 7.4 K/UL (ref 4.1–11.1)

## 2025-09-05 PROCEDURE — 2580000003 HC RX 258

## 2025-09-05 PROCEDURE — 84145 PROCALCITONIN (PCT): CPT

## 2025-09-05 PROCEDURE — 97530 THERAPEUTIC ACTIVITIES: CPT

## 2025-09-05 PROCEDURE — 2500000003 HC RX 250 WO HCPCS: Performed by: INTERNAL MEDICINE

## 2025-09-05 PROCEDURE — 83735 ASSAY OF MAGNESIUM: CPT

## 2025-09-05 PROCEDURE — 6370000000 HC RX 637 (ALT 250 FOR IP): Performed by: INTERNAL MEDICINE

## 2025-09-05 PROCEDURE — 84100 ASSAY OF PHOSPHORUS: CPT

## 2025-09-05 PROCEDURE — 6370000000 HC RX 637 (ALT 250 FOR IP): Performed by: NURSE PRACTITIONER

## 2025-09-05 PROCEDURE — 6360000002 HC RX W HCPCS

## 2025-09-05 PROCEDURE — 99232 SBSQ HOSP IP/OBS MODERATE 35: CPT | Performed by: INTERNAL MEDICINE

## 2025-09-05 PROCEDURE — 6360000002 HC RX W HCPCS: Performed by: INTERNAL MEDICINE

## 2025-09-05 PROCEDURE — 86140 C-REACTIVE PROTEIN: CPT

## 2025-09-05 PROCEDURE — 2060000000 HC ICU INTERMEDIATE R&B

## 2025-09-05 PROCEDURE — 36415 COLL VENOUS BLD VENIPUNCTURE: CPT

## 2025-09-05 PROCEDURE — 82962 GLUCOSE BLOOD TEST: CPT

## 2025-09-05 PROCEDURE — 85025 COMPLETE CBC W/AUTO DIFF WBC: CPT

## 2025-09-05 PROCEDURE — 80048 BASIC METABOLIC PNL TOTAL CA: CPT

## 2025-09-05 RX ORDER — FUROSEMIDE 40 MG/1
80 TABLET ORAL DAILY
Status: DISCONTINUED | OUTPATIENT
Start: 2025-09-06 | End: 2025-09-06 | Stop reason: HOSPADM

## 2025-09-05 RX ORDER — INSULIN LISPRO 100 [IU]/ML
0-16 INJECTION, SOLUTION INTRAVENOUS; SUBCUTANEOUS
Status: DISCONTINUED | OUTPATIENT
Start: 2025-09-05 | End: 2025-09-06

## 2025-09-05 RX ORDER — FUROSEMIDE 10 MG/ML
40 INJECTION INTRAMUSCULAR; INTRAVENOUS ONCE
Status: DISCONTINUED | OUTPATIENT
Start: 2025-09-05 | End: 2025-09-05

## 2025-09-05 RX ORDER — FUROSEMIDE 10 MG/ML
20 INJECTION INTRAMUSCULAR; INTRAVENOUS ONCE
Status: COMPLETED | OUTPATIENT
Start: 2025-09-05 | End: 2025-09-05

## 2025-09-05 RX ORDER — LISINOPRIL 10 MG/1
10 TABLET ORAL DAILY
Status: DISCONTINUED | OUTPATIENT
Start: 2025-09-05 | End: 2025-09-06 | Stop reason: HOSPADM

## 2025-09-05 RX ADMIN — CLOPIDOGREL BISULFATE 75 MG: 75 TABLET, FILM COATED ORAL at 09:56

## 2025-09-05 RX ADMIN — DABIGATRAN ETEXILATE MESYLATE 150 MG: 150 CAPSULE ORAL at 09:57

## 2025-09-05 RX ADMIN — DOFETILIDE 250 MCG: 0.25 CAPSULE ORAL at 10:02

## 2025-09-05 RX ADMIN — DIPHENOXYLATE HYDROCHLORIDE AND ATROPINE SULFATE 1 TABLET: 2.5; .025 TABLET ORAL at 05:52

## 2025-09-05 RX ADMIN — DIPHENOXYLATE HYDROCHLORIDE AND ATROPINE SULFATE 1 TABLET: 2.5; .025 TABLET ORAL at 18:01

## 2025-09-05 RX ADMIN — LISINOPRIL 10 MG: 10 TABLET ORAL at 10:06

## 2025-09-05 RX ADMIN — DIPHENOXYLATE HYDROCHLORIDE AND ATROPINE SULFATE 1 TABLET: 2.5; .025 TABLET ORAL at 12:51

## 2025-09-05 RX ADMIN — METOPROLOL TARTRATE 25 MG: 25 TABLET, FILM COATED ORAL at 09:57

## 2025-09-05 RX ADMIN — PIPERACILLIN AND TAZOBACTAM 3375 MG: 3; .375 INJECTION, POWDER, LYOPHILIZED, FOR SOLUTION INTRAVENOUS at 17:56

## 2025-09-05 RX ADMIN — MICONAZOLE NITRATE: 20 CREAM TOPICAL at 10:09

## 2025-09-05 RX ADMIN — FUROSEMIDE 20 MG: 10 INJECTION, SOLUTION INTRAMUSCULAR; INTRAVENOUS at 09:57

## 2025-09-05 RX ADMIN — METOPROLOL TARTRATE 25 MG: 25 TABLET, FILM COATED ORAL at 22:06

## 2025-09-05 RX ADMIN — CHOLESTYRAMINE 4 G: 4 POWDER, FOR SUSPENSION ORAL at 09:56

## 2025-09-05 RX ADMIN — INSULIN HUMAN 22 UNITS: 100 INJECTION, SUSPENSION SUBCUTANEOUS at 09:55

## 2025-09-05 RX ADMIN — DIPHENOXYLATE HYDROCHLORIDE AND ATROPINE SULFATE 1 TABLET: 2.5; .025 TABLET ORAL at 00:53

## 2025-09-05 RX ADMIN — FUROSEMIDE 20 MG: 10 INJECTION, SOLUTION INTRAMUSCULAR; INTRAVENOUS at 17:58

## 2025-09-05 RX ADMIN — INSULIN HUMAN 22 UNITS: 100 INJECTION, SUSPENSION SUBCUTANEOUS at 17:42

## 2025-09-05 RX ADMIN — SODIUM CHLORIDE, PRESERVATIVE FREE 10 ML: 5 INJECTION INTRAVENOUS at 22:07

## 2025-09-05 RX ADMIN — PIPERACILLIN AND TAZOBACTAM 3375 MG: 3; .375 INJECTION, POWDER, LYOPHILIZED, FOR SOLUTION INTRAVENOUS at 00:53

## 2025-09-05 RX ADMIN — ATORVASTATIN CALCIUM 10 MG: 20 TABLET, FILM COATED ORAL at 09:57

## 2025-09-05 RX ADMIN — SODIUM CHLORIDE, PRESERVATIVE FREE 10 ML: 5 INJECTION INTRAVENOUS at 10:09

## 2025-09-05 RX ADMIN — DABIGATRAN ETEXILATE MESYLATE 150 MG: 150 CAPSULE ORAL at 22:06

## 2025-09-05 RX ADMIN — PIPERACILLIN AND TAZOBACTAM 3375 MG: 3; .375 INJECTION, POWDER, LYOPHILIZED, FOR SOLUTION INTRAVENOUS at 10:13

## 2025-09-05 ASSESSMENT — PAIN SCALES - GENERAL
PAINLEVEL_OUTOF10: 0
PAINLEVEL_OUTOF10: 3
PAINLEVEL_OUTOF10: 0

## 2025-09-06 VITALS
BODY MASS INDEX: 29.45 KG/M2 | TEMPERATURE: 98 F | SYSTOLIC BLOOD PRESSURE: 132 MMHG | RESPIRATION RATE: 18 BRPM | OXYGEN SATURATION: 98 % | WEIGHT: 229.5 LBS | HEIGHT: 74 IN | DIASTOLIC BLOOD PRESSURE: 74 MMHG | HEART RATE: 69 BPM

## 2025-09-06 LAB
ANION GAP SERPL CALC-SCNC: 3 MMOL/L (ref 2–12)
BASOPHILS # BLD: 0.06 K/UL (ref 0–0.1)
BASOPHILS NFR BLD: 0.7 % (ref 0–1)
BUN SERPL-MCNC: 20 MG/DL (ref 6–20)
BUN/CREAT SERPL: 22 (ref 12–20)
CA-I BLD-MCNC: 8.8 MG/DL (ref 8.5–10.1)
CHLORIDE SERPL-SCNC: 110 MMOL/L (ref 97–108)
CO2 SERPL-SCNC: 26 MMOL/L (ref 21–32)
CREAT SERPL-MCNC: 0.93 MG/DL (ref 0.7–1.3)
DIFFERENTIAL METHOD BLD: ABNORMAL
EOSINOPHIL # BLD: 0.46 K/UL (ref 0–0.4)
EOSINOPHIL NFR BLD: 5.1 % (ref 0–7)
ERYTHROCYTE [DISTWIDTH] IN BLOOD BY AUTOMATED COUNT: 12.9 % (ref 11.5–14.5)
GLUCOSE BLD STRIP.AUTO-MCNC: 166 MG/DL (ref 65–100)
GLUCOSE BLD STRIP.AUTO-MCNC: 96 MG/DL (ref 65–100)
GLUCOSE SERPL-MCNC: 86 MG/DL (ref 65–100)
HCT VFR BLD AUTO: 32.2 % (ref 36.6–50.3)
HGB BLD-MCNC: 10.3 G/DL (ref 12.1–17)
IMM GRANULOCYTES # BLD AUTO: 0.06 K/UL (ref 0–0.04)
IMM GRANULOCYTES NFR BLD AUTO: 0.7 % (ref 0–0.5)
LYMPHOCYTES # BLD: 1.68 K/UL (ref 0.8–3.5)
LYMPHOCYTES NFR BLD: 18.6 % (ref 12–49)
MAGNESIUM SERPL-MCNC: 2.3 MG/DL (ref 1.6–2.4)
MCH RBC QN AUTO: 31 PG (ref 26–34)
MCHC RBC AUTO-ENTMCNC: 32 G/DL (ref 30–36.5)
MCV RBC AUTO: 97 FL (ref 80–99)
MONOCYTES # BLD: 0.74 K/UL (ref 0–1)
MONOCYTES NFR BLD: 8.2 % (ref 5–13)
NEUTS SEG # BLD: 6.03 K/UL (ref 1.8–8)
NEUTS SEG NFR BLD: 66.7 % (ref 32–75)
NRBC # BLD: 0 K/UL (ref 0–0.01)
NRBC BLD-RTO: 0 PER 100 WBC
PERFORMED BY:: ABNORMAL
PERFORMED BY:: NORMAL
PHOSPHATE SERPL-MCNC: 3.1 MG/DL (ref 2.6–4.7)
PLATELET # BLD AUTO: 282 K/UL (ref 150–400)
PMV BLD AUTO: 10.2 FL (ref 8.9–12.9)
POTASSIUM SERPL-SCNC: 4.3 MMOL/L (ref 3.5–5.1)
RBC # BLD AUTO: 3.32 M/UL (ref 4.1–5.7)
SODIUM SERPL-SCNC: 139 MMOL/L (ref 136–145)
WBC # BLD AUTO: 9 K/UL (ref 4.1–11.1)

## 2025-09-06 PROCEDURE — 36415 COLL VENOUS BLD VENIPUNCTURE: CPT

## 2025-09-06 PROCEDURE — 80048 BASIC METABOLIC PNL TOTAL CA: CPT

## 2025-09-06 PROCEDURE — 6370000000 HC RX 637 (ALT 250 FOR IP): Performed by: NURSE PRACTITIONER

## 2025-09-06 PROCEDURE — 2580000003 HC RX 258

## 2025-09-06 PROCEDURE — 84100 ASSAY OF PHOSPHORUS: CPT

## 2025-09-06 PROCEDURE — 6370000000 HC RX 637 (ALT 250 FOR IP): Performed by: INTERNAL MEDICINE

## 2025-09-06 PROCEDURE — 83735 ASSAY OF MAGNESIUM: CPT

## 2025-09-06 PROCEDURE — 85025 COMPLETE CBC W/AUTO DIFF WBC: CPT

## 2025-09-06 PROCEDURE — 6360000002 HC RX W HCPCS

## 2025-09-06 PROCEDURE — 2500000003 HC RX 250 WO HCPCS: Performed by: INTERNAL MEDICINE

## 2025-09-06 PROCEDURE — 82962 GLUCOSE BLOOD TEST: CPT

## 2025-09-06 RX ORDER — METOPROLOL TARTRATE 25 MG/1
25 TABLET, FILM COATED ORAL 2 TIMES DAILY
Qty: 60 TABLET | Refills: 3 | Status: SHIPPED | OUTPATIENT
Start: 2025-09-06

## 2025-09-06 RX ORDER — LEVOFLOXACIN 750 MG/1
750 TABLET, FILM COATED ORAL DAILY
Qty: 14 TABLET | Refills: 0 | Status: SHIPPED | OUTPATIENT
Start: 2025-09-06 | End: 2025-09-20

## 2025-09-06 RX ORDER — INSULIN LISPRO 100 [IU]/ML
0-8 INJECTION, SOLUTION INTRAVENOUS; SUBCUTANEOUS
Status: DISCONTINUED | OUTPATIENT
Start: 2025-09-06 | End: 2025-09-06 | Stop reason: HOSPADM

## 2025-09-06 RX ORDER — DOXYCYCLINE HYCLATE 100 MG
100 TABLET ORAL 2 TIMES DAILY
Qty: 28 TABLET | Refills: 0 | Status: SHIPPED | OUTPATIENT
Start: 2025-09-06 | End: 2025-09-20

## 2025-09-06 RX ORDER — METOLAZONE 2.5 MG/1
2.5 TABLET ORAL DAILY PRN
Qty: 30 TABLET | Refills: 3 | Status: SHIPPED | OUTPATIENT
Start: 2025-09-06

## 2025-09-06 RX ORDER — CLOPIDOGREL BISULFATE 75 MG/1
75 TABLET ORAL DAILY
Qty: 30 TABLET | Refills: 3 | Status: SHIPPED | OUTPATIENT
Start: 2025-09-06

## 2025-09-06 RX ADMIN — CLOPIDOGREL BISULFATE 75 MG: 75 TABLET, FILM COATED ORAL at 08:42

## 2025-09-06 RX ADMIN — DABIGATRAN ETEXILATE MESYLATE 150 MG: 150 CAPSULE ORAL at 08:42

## 2025-09-06 RX ADMIN — ATORVASTATIN CALCIUM 10 MG: 20 TABLET, FILM COATED ORAL at 08:42

## 2025-09-06 RX ADMIN — FUROSEMIDE 80 MG: 40 TABLET ORAL at 08:42

## 2025-09-06 RX ADMIN — PIPERACILLIN AND TAZOBACTAM 3375 MG: 3; .375 INJECTION, POWDER, LYOPHILIZED, FOR SOLUTION INTRAVENOUS at 08:51

## 2025-09-06 RX ADMIN — SODIUM CHLORIDE, PRESERVATIVE FREE 10 ML: 5 INJECTION INTRAVENOUS at 08:50

## 2025-09-06 RX ADMIN — CHOLESTYRAMINE 4 G: 4 POWDER, FOR SUSPENSION ORAL at 08:49

## 2025-09-06 RX ADMIN — PIPERACILLIN AND TAZOBACTAM 3375 MG: 3; .375 INJECTION, POWDER, LYOPHILIZED, FOR SOLUTION INTRAVENOUS at 01:12

## 2025-09-06 RX ADMIN — DIPHENOXYLATE HYDROCHLORIDE AND ATROPINE SULFATE 1 TABLET: 2.5; .025 TABLET ORAL at 00:59

## 2025-09-06 RX ADMIN — ACETAMINOPHEN 650 MG: 325 TABLET ORAL at 00:54

## 2025-09-06 RX ADMIN — MICONAZOLE NITRATE: 20 CREAM TOPICAL at 08:52

## 2025-09-06 RX ADMIN — LISINOPRIL 10 MG: 10 TABLET ORAL at 08:42

## 2025-09-06 RX ADMIN — DOFETILIDE 250 MCG: 0.25 CAPSULE ORAL at 08:43

## 2025-09-06 RX ADMIN — DIPHENOXYLATE HYDROCHLORIDE AND ATROPINE SULFATE 1 TABLET: 2.5; .025 TABLET ORAL at 12:08

## 2025-09-06 RX ADMIN — DIPHENOXYLATE HYDROCHLORIDE AND ATROPINE SULFATE 1 TABLET: 2.5; .025 TABLET ORAL at 08:42

## 2025-09-06 RX ADMIN — INSULIN HUMAN 22 UNITS: 100 INJECTION, SUSPENSION SUBCUTANEOUS at 08:47

## 2025-09-06 RX ADMIN — METOPROLOL TARTRATE 25 MG: 25 TABLET, FILM COATED ORAL at 08:42

## 2025-09-06 ASSESSMENT — PAIN SCALES - GENERAL
PAINLEVEL_OUTOF10: 0
PAINLEVEL_OUTOF10: 6
PAINLEVEL_OUTOF10: 4

## 2025-09-06 ASSESSMENT — PAIN DESCRIPTION - ORIENTATION: ORIENTATION: LEFT

## 2025-09-06 ASSESSMENT — PAIN - FUNCTIONAL ASSESSMENT
PAIN_FUNCTIONAL_ASSESSMENT: 0-10
PAIN_FUNCTIONAL_ASSESSMENT: 0-10

## 2025-09-06 ASSESSMENT — PAIN DESCRIPTION - LOCATION: LOCATION: KNEE;FOOT

## 2025-09-06 ASSESSMENT — PAIN DESCRIPTION - DESCRIPTORS: DESCRIPTORS: ACHING

## 2025-09-07 LAB
BACTERIA SPEC CULT: ABNORMAL
BACTERIA SPEC CULT: NORMAL
GRAM STN SPEC: ABNORMAL
GRAM STN SPEC: ABNORMAL
Lab: ABNORMAL
Lab: ABNORMAL
Lab: NORMAL

## (undated) DEVICE — TRAY SURG CUST CRD CATH 3 PRT SSR

## (undated) DEVICE — SYRINGE MED 10ML PUR GAM COMPATIBLE POLYCARB FIX M LUER CONN

## (undated) DEVICE — DRAPE MIN PROC W22XL25IN INCIS W2.5XL2.5IN BLU FAB RESIST

## (undated) DEVICE — MASK O2 MED AD 7 FT 3 IN 1 W/ STD CONN LTX

## (undated) DEVICE — CATHETER DIAG 5FR L100CM LUMN ID0.047IN JL4 CRV 0 SIDE H

## (undated) DEVICE — GUIDEWIRE VASC L260CM DIA0.035IN TIP L3MM STD EXCHG PTFE J

## (undated) DEVICE — MASK ANES INF SZ 2 PREM TAIL VLV INFL PRT UNSCENTED SGL PT

## (undated) DEVICE — SC 3W MP RA OFF PB - PG

## (undated) DEVICE — CATHETER DIAG 5FR L100CM LUMN ID0.047IN JR4 CRV 0 SIDE H

## (undated) DEVICE — CATHETER COR DIAG JUDKINS L 4.5 CRV 5FR 100CM 0 SIDE H

## (undated) DEVICE — SHEATH INTRO 6FR L10CM DIL 0.021IN PLAS SHT ANG GWIRE L45CM

## (undated) DEVICE — CATHETER DIAG 5FR L100CM SPEC IMA CRV SZ DBL BRAID WIRE SFT

## (undated) DEVICE — Device

## (undated) DEVICE — SYRINGE MED 10ML RED POLYCARB BRL FIX M LUER CONN FLAT GRP